# Patient Record
Sex: MALE | Race: BLACK OR AFRICAN AMERICAN | Employment: OTHER | URBAN - METROPOLITAN AREA
[De-identification: names, ages, dates, MRNs, and addresses within clinical notes are randomized per-mention and may not be internally consistent; named-entity substitution may affect disease eponyms.]

---

## 2017-05-10 ENCOUNTER — HOSPITAL ENCOUNTER (OUTPATIENT)
Age: 82
Discharge: HOME | End: 2017-05-10
Payer: MEDICARE

## 2017-05-10 LAB
ALBUMIN SERPL-MCNC: 3.8 G/DL (ref 3.5–5.2)
ALBUMIN/GLOBULIN RATIO: ABNORMAL (ref 1–2.5)
ALP BLD-CCNC: 87 U/L (ref 40–129)
ALT SERPL-CCNC: 20 U/L (ref 5–41)
ANION GAP SERPL CALCULATED.3IONS-SCNC: 11 MMOL/L (ref 9–17)
AST SERPL-CCNC: 32 U/L
BILIRUB SERPL-MCNC: 0.63 MG/DL (ref 0.3–1.2)
BNP INTERPRETATION: ABNORMAL
BUN BLDV-MCNC: 25 MG/DL (ref 8–23)
BUN/CREAT BLD: 15 (ref 9–20)
CALCIUM SERPL-MCNC: 9.6 MG/DL (ref 8.6–10.4)
CHLORIDE BLD-SCNC: 105 MMOL/L (ref 98–107)
CHOLESTEROL, FASTING: 136 MG/DL
CHOLESTEROL/HDL RATIO: 1.7
CO2: 27 MMOL/L (ref 20–31)
CREAT SERPL-MCNC: 1.64 MG/DL (ref 0.7–1.2)
ESTIMATED AVERAGE GLUCOSE: 123 MG/DL
GFR AFRICAN AMERICAN: 49 ML/MIN
GFR NON-AFRICAN AMERICAN: 40 ML/MIN
GFR SERPL CREATININE-BSD FRML MDRD: ABNORMAL ML/MIN/{1.73_M2}
GFR SERPL CREATININE-BSD FRML MDRD: ABNORMAL ML/MIN/{1.73_M2}
GLUCOSE FASTING: 98 MG/DL (ref 70–99)
HBA1C MFR BLD: 5.9 % (ref 4–6)
HCT VFR BLD CALC: 45.6 % (ref 41–53)
HDLC SERPL-MCNC: 79 MG/DL
HEMOGLOBIN: 14.5 G/DL (ref 13.5–17.5)
LDL CHOLESTEROL: 47 MG/DL (ref 0–130)
MCH RBC QN AUTO: 26.9 PG (ref 26–34)
MCHC RBC AUTO-ENTMCNC: 31.7 G/DL (ref 31–37)
MCV RBC AUTO: 84.6 FL (ref 80–100)
PDW BLD-RTO: 16.2 % (ref 11.5–14.5)
PLATELET # BLD: 121 K/UL (ref 130–400)
PMV BLD AUTO: 10.3 FL (ref 6–12)
POTASSIUM SERPL-SCNC: 3.9 MMOL/L (ref 3.7–5.3)
PRO-BNP: 1560 PG/ML
PROSTATE SPECIFIC ANTIGEN: <0.01 UG/L
RBC # BLD: 5.39 M/UL (ref 4.5–5.9)
SODIUM BLD-SCNC: 143 MMOL/L (ref 135–144)
TOTAL PROTEIN: 6.6 G/DL (ref 6.4–8.3)
TRIGLYCERIDE, FASTING: 51 MG/DL
VITAMIN D 25-HYDROXY: 21.1 NG/ML (ref 30–100)
VLDLC SERPL CALC-MCNC: NORMAL MG/DL (ref 1–30)
WBC # BLD: 2.5 K/UL (ref 3.5–11)

## 2017-05-10 PROCEDURE — 36415 COLL VENOUS BLD VENIPUNCTURE: CPT

## 2017-05-10 PROCEDURE — 80061 LIPID PANEL: CPT

## 2017-05-10 PROCEDURE — 84153 ASSAY OF PSA TOTAL: CPT

## 2017-05-10 PROCEDURE — 80053 COMPREHEN METABOLIC PANEL: CPT

## 2017-05-10 PROCEDURE — 82306 VITAMIN D 25 HYDROXY: CPT

## 2017-05-10 PROCEDURE — 83880 ASSAY OF NATRIURETIC PEPTIDE: CPT

## 2017-05-10 PROCEDURE — 85027 COMPLETE CBC AUTOMATED: CPT

## 2017-05-10 PROCEDURE — 83036 HEMOGLOBIN GLYCOSYLATED A1C: CPT

## 2017-06-16 ENCOUNTER — OFFICE VISIT (OUTPATIENT)
Dept: INTERNAL MEDICINE CLINIC | Age: 82
End: 2017-06-16
Payer: MEDICARE

## 2017-06-16 VITALS
WEIGHT: 170 LBS | HEART RATE: 54 BPM | DIASTOLIC BLOOD PRESSURE: 58 MMHG | BODY MASS INDEX: 24.34 KG/M2 | TEMPERATURE: 98.6 F | RESPIRATION RATE: 15 BRPM | SYSTOLIC BLOOD PRESSURE: 126 MMHG | HEIGHT: 70 IN | OXYGEN SATURATION: 94 %

## 2017-06-16 DIAGNOSIS — M19.90 ARTHRITIS: ICD-10-CM

## 2017-06-16 DIAGNOSIS — I10 ESSENTIAL HYPERTENSION: Primary | ICD-10-CM

## 2017-06-16 PROCEDURE — 99213 OFFICE O/P EST LOW 20 MIN: CPT | Performed by: INTERNAL MEDICINE

## 2017-06-16 RX ORDER — PENTOXIFYLLINE 400 MG/1
TABLET, EXTENDED RELEASE ORAL
COMMUNITY
Start: 2017-06-07 | End: 2018-12-20 | Stop reason: ALTCHOICE

## 2017-06-16 ASSESSMENT — PATIENT HEALTH QUESTIONNAIRE - PHQ9
SUM OF ALL RESPONSES TO PHQ QUESTIONS 1-9: 0
1. LITTLE INTEREST OR PLEASURE IN DOING THINGS: 0
SUM OF ALL RESPONSES TO PHQ9 QUESTIONS 1 & 2: 0
2. FEELING DOWN, DEPRESSED OR HOPELESS: 0

## 2017-07-28 ENCOUNTER — TELEPHONE (OUTPATIENT)
Dept: INTERNAL MEDICINE CLINIC | Age: 82
End: 2017-07-28

## 2017-07-28 RX ORDER — PREDNISONE 10 MG/1
10 TABLET ORAL 3 TIMES DAILY
Qty: 15 TABLET | Refills: 0 | Status: SHIPPED | OUTPATIENT
Start: 2017-07-28 | End: 2017-08-02

## 2017-08-21 ENCOUNTER — HOSPITAL ENCOUNTER (OUTPATIENT)
Age: 82
Discharge: HOME | End: 2017-08-21
Payer: MEDICARE

## 2017-08-21 LAB
ALBUMIN SERPL-MCNC: 4.2 G/DL (ref 3.5–5.2)
ALBUMIN/GLOBULIN RATIO: ABNORMAL (ref 1–2.5)
ALP BLD-CCNC: 90 U/L (ref 40–129)
ALT SERPL-CCNC: 17 U/L (ref 5–41)
ANION GAP SERPL CALCULATED.3IONS-SCNC: 12 MMOL/L (ref 9–17)
AST SERPL-CCNC: 32 U/L
BILIRUB SERPL-MCNC: 0.67 MG/DL (ref 0.3–1.2)
BNP INTERPRETATION: ABNORMAL
BUN BLDV-MCNC: 33 MG/DL (ref 8–23)
BUN/CREAT BLD: 18 (ref 9–20)
CALCIUM SERPL-MCNC: 10 MG/DL (ref 8.6–10.4)
CHLORIDE BLD-SCNC: 104 MMOL/L (ref 98–107)
CO2: 26 MMOL/L (ref 20–31)
CREAT SERPL-MCNC: 1.8 MG/DL (ref 0.7–1.2)
GFR AFRICAN AMERICAN: 44 ML/MIN
GFR NON-AFRICAN AMERICAN: 36 ML/MIN
GFR SERPL CREATININE-BSD FRML MDRD: ABNORMAL ML/MIN/{1.73_M2}
GFR SERPL CREATININE-BSD FRML MDRD: ABNORMAL ML/MIN/{1.73_M2}
GLUCOSE BLD-MCNC: 92 MG/DL (ref 70–99)
HCT VFR BLD CALC: 49.3 % (ref 41–53)
HEMOGLOBIN: 15.9 G/DL (ref 13.5–17.5)
MCH RBC QN AUTO: 27.2 PG (ref 26–34)
MCHC RBC AUTO-ENTMCNC: 32.2 G/DL (ref 31–37)
MCV RBC AUTO: 84.6 FL (ref 80–100)
PDW BLD-RTO: 16 % (ref 11.5–14.5)
PLATELET # BLD: 120 K/UL (ref 130–400)
PMV BLD AUTO: 10.4 FL (ref 6–12)
POTASSIUM SERPL-SCNC: 4.3 MMOL/L (ref 3.7–5.3)
PRO-BNP: 887 PG/ML
RBC # BLD: 5.83 M/UL (ref 4.5–5.9)
SODIUM BLD-SCNC: 142 MMOL/L (ref 135–144)
TOTAL PROTEIN: 7.1 G/DL (ref 6.4–8.3)
WBC # BLD: 3.9 K/UL (ref 3.5–11)

## 2017-08-21 PROCEDURE — 85027 COMPLETE CBC AUTOMATED: CPT

## 2017-08-21 PROCEDURE — 83880 ASSAY OF NATRIURETIC PEPTIDE: CPT

## 2017-08-21 PROCEDURE — 36415 COLL VENOUS BLD VENIPUNCTURE: CPT

## 2017-08-21 PROCEDURE — 80053 COMPREHEN METABOLIC PANEL: CPT

## 2017-12-22 ENCOUNTER — OFFICE VISIT (OUTPATIENT)
Dept: INTERNAL MEDICINE CLINIC | Age: 82
End: 2017-12-22
Payer: MEDICARE

## 2017-12-22 VITALS
BODY MASS INDEX: 24.3 KG/M2 | RESPIRATION RATE: 16 BRPM | SYSTOLIC BLOOD PRESSURE: 123 MMHG | HEIGHT: 71 IN | HEART RATE: 63 BPM | WEIGHT: 173.6 LBS | DIASTOLIC BLOOD PRESSURE: 65 MMHG | OXYGEN SATURATION: 97 %

## 2017-12-22 DIAGNOSIS — M19.90 ARTHRITIS: ICD-10-CM

## 2017-12-22 DIAGNOSIS — J31.0 RHINITIS, UNSPECIFIED CHRONICITY, UNSPECIFIED TYPE: ICD-10-CM

## 2017-12-22 DIAGNOSIS — I10 ESSENTIAL HYPERTENSION: Primary | ICD-10-CM

## 2017-12-22 PROCEDURE — 99214 OFFICE O/P EST MOD 30 MIN: CPT | Performed by: INTERNAL MEDICINE

## 2017-12-22 NOTE — PROGRESS NOTES
Cristela Balderrama is a 80 y.o. male who presents for   Chief Complaint   Patient presents with    6 Month Follow-Up    Hypertension    Toe Pain     bilateral toe pain. 1+ month, toes are not currently hurting, it goes on and off    Cough     2 + days    Nasal Congestion     2+ days    Chest Congestion     2+ days    and follow up of chronic medical problems. Patient Active Problem List   Diagnosis    Hypercholesteremia    Sleep apnea    Prostate cancer ([facility] [address]    CRF (chronic renal failure)    OA (osteoarthritis) of knee    Hypercholesteremia    Impotence    HTN (hypertension)    Closed fracture of shaft of metacarpal bone    Primary osteoarthritis of right hip     HPI  Here for follow-up on blood pressure and patient complains of occasional toe pain and also have some runny nose    Current Outpatient Prescriptions   Medication Sig Dispense Refill    pentoxifylline (TRENTAL) 400 MG extended release tablet       vitamin D (CHOLECALCIFEROL) 1000 UNIT TABS tablet Take 1 tablet by mouth daily 60 tablet 1    mirtazapine (REMERON) 15 MG tablet Take 1 tablet by mouth daily      Compression Stockings MISC by Does not apply route 20 - 30 mm calf length 1 each 0    apixaban (ELIQUIS) 2.5 MG TABS tablet Take 1 tablet by mouth 2 times daily 180 tablet 3    telmisartan-hydrochlorothiazide (MICARDIS HCT) 40-12.5 MG per tablet Take 1 tablet by mouth daily 90 tablet 3    rosuvastatin (CRESTOR) 40 MG tablet Take 1 tablet by mouth every evening 90 tablet 3    famotidine (PEPCID) 20 MG tablet Take 1 tablet by mouth daily 90 tablet 3    niacin (NIASPAN) 1000 MG CR tablet Take 1 tablet by mouth 2 times daily 180 tablet 3    aspirin 81 MG tablet Take 81 mg by mouth daily.  FISH OIL Take 1 capsule by mouth 2 times daily        No current facility-administered medications for this visit.         Allergies   Allergen Reactions    Ace Inhibitors     Other      Beta-blockers slow heart rate    Timolol      Eye drops slow heart rate       Past Medical History:   Diagnosis Date    BPH (benign prostatic hyperplasia)     secondary to reynolds    CAD (coronary artery disease) 1/18/2005    CRF (chronic renal failure) 01/01    patient denies kidney problems at pre-testing 2015    DVD (dissociated vertical deviation)     Gout     H/O cardiac catheterization 1/18/05    stents     HTN (hypertension)     Hx of blood clots     left leg (over 23 yrs ago)    Hypercholesteremia     Impotence     Irregular heartbeat     Nodular prostate     -bx Dr. Diamante Long 8/96 and focal inflammation 6/97    OA (osteoarthritis) of knee     Prostate cancer ([facility] [address] 1/06    Unspecified sleep apnea     no machine       Past Surgical History:   Procedure Laterality Date    CORONARY ANGIOPLASTY      CYSTOPLASTY N/A     urethral dilation 10/93 Dr Adi Medina Left     cataract    HAND SURGERY Right 1/08/15    Dr. Rich Neal did right ring and little finger metacarpophalangeal joint extension    JOINT REPLACEMENT      PROSTATE SURGERY      seed implant    REVISION TOTAL HIP ARTHROPLASTY Left 05/07    Dr Declan Hendricks Right 10/20/2015       Family History   Problem Relation Age of Onset    Coronary Art Dis Mother     Hypertension Mother     Cancer Brother      ROS   Constitutional:  Negative for fatigue, loss of appetite and unexpected weight change   HEENT            : Negative for neck stiffness and pain, no congestion or sinus pressure   Eyes                : No visual disturbance or pain   Cardiovascular: No chest pain or palpitations or leg swelling   Respiratory      : Negative for cough, shortness of breath or wheezing   Gastrointestinal: Negative for abdominal pain, constipation or diarrhea and bloating No nausea or vomiting   Genitourinary:     No urgency or frequency, no burning or hematuria   Musculoskeletal: No arthralgias, back pain or myalgias   Skin                  : Negative for rash or erythema   Neurological    : Negative for dizziness, weakness, tremors ,light headedness or syncope   Psychiatric       : Negative for dysphoric mood, sleep disturbances, nervous or anxious, or decreased concentration   All other review of systems was negative    Objective  Physical Examination:    Nursing note reviewed    /65 (Site: Left Arm, Position: Sitting, Cuff Size: Medium Adult)   Pulse 63   Resp 16   Ht 5' 11\" (1.803 m)   Wt 173 lb 9.6 oz (78.7 kg)   SpO2 97%   BMI 24.21 kg/m²   BP Readings from Last 3 Encounters:   12/22/17 123/65   06/16/17 (!) 126/58   11/16/16 138/70         Constitutional:  Adriana Slaughter is oriented to place, person and time ,appears well-developed and well-nourished  HEENT:  Atraumatic and normocephalic, external ears normal bilaterally, nose normal no oropharyngeal exudate and is clear and moist  Eyes:  EOCM normal; conjunctivae normal; PERRLA bilaterally  Neck:  Normal range of motion, neck supple, no JVD and no thyromegaly  Cardiovascular:  RRR, normal heart sounds and intact distal pulses  Pulmonary:  effort normal and breath sounds normal bilaterally,no wheezes or rales, no respiratory distress  Abdominal:  Soft, non-tender; normal bowel sounds, no masses  Musculoskeletal:  Normal range of motion and no edema or tenderness bilaterally  No lymphadenopathy  Neurological:  alert, oriented, and normal reflexes bilaterally  Skin: warm and dry  Psychiatric:  normal mood and effect; behavior normal.    Labs:   Lab Results   Component Value Date    LABA1C 5.9 05/10/2017     Lab Results   Component Value Date    CHOL 145 09/03/2016     Lab Results   Component Value Date    HDL 79 05/10/2017     Lab Results   Component Value Date    LDLCALC 64 05/16/2016     Lab Results   Component Value Date    TRIG 42 09/03/2016     No components found for: CHOLHDL  Lab Results   Component Value Date    WBC 3.9 08/21/2017    HGB 15.9 08/21/2017    HCT 49.3 08/21/2017    MCV 84.6 08/21/2017  (L) 08/21/2017     Lab Results   Component Value Date    INR 1.2 10/08/2015    PROTIME 12.5 (H) 10/08/2015     Lab Results   Component Value Date    GLUCOSE 92 08/21/2017    CREATININE 1.80 (H) 08/21/2017    BUN 33 (H) 08/21/2017     08/21/2017    K 4.3 08/21/2017     08/21/2017    CO2 26 08/21/2017     Lab Results   Component Value Date    ALT 17 08/21/2017    AST 32 08/21/2017    ALKPHOS 90 08/21/2017    BILITOT 0.67 08/21/2017     Lab Results   Component Value Date    LABPROT 5.3 (L) 11/18/2012    LABALBU 4.2 08/21/2017     No results found for: TSH, CBC  Assessment:  1. Essential hypertension    2. Arthritis    3. Rhinitis, unspecified chronicity, unspecified type        Plan:  Blood pressure stable on current medications and patient's visit cardiologist last month and office notes reviewed  Patient's arthritis is stable and I did advise patient to see the foot doctor for toe pain and patient mentioned that patient already saw the foot doctor and he also mentioned that it was due to arthritis  Patient was using Coricidin for his cold symptoms and advised him to continue and there is no evidence of any infection  Review in 6 monthsnutrition and exercise           1. Marissa Pedraza received counseling on the following healthy behaviors: nutrition, exercise     2. Prior labs and health maintenance reviewed. 3.  Discussed use, benefit, and side effects of prescribed medications. Barriers to medication compliance addressed. All his questions were answered. Pt voiced understanding. Marissa Pedraza will continue current medications, diet and exercise. No orders of the defined types were placed in this encounter. Completed Refills               Requested Prescriptions     Pending Prescriptions Disp Refills    vitamin D (CHOLECALCIFEROL) 1000 UNIT TABS tablet 60 tablet 1     Sig: Take 1 tablet by mouth daily     4. Patient given educational materials - see patient instructions    5.  Was a self-tracking handout given in paper form or via [employer]? NO    No orders of the defined types were placed in this encounter. No Follow-up on file. Patient voiced understanding and agreed to treatment plan. Electronically signed by Sav Porter MD on 12/22/2017 at 12:09 PM    This note is created with a voice recognition program and while intend to generate a document that accurately reflects the content of the visit, no guarantee can be provided that every mistake has been identified and corrected by editing.

## 2018-01-24 ENCOUNTER — HOSPITAL ENCOUNTER (OUTPATIENT)
Age: 83
Discharge: HOME | End: 2018-01-24
Payer: MEDICARE

## 2018-01-25 ENCOUNTER — OFFICE VISIT (OUTPATIENT)
Dept: INTERNAL MEDICINE CLINIC | Age: 83
End: 2018-01-25
Payer: MEDICARE

## 2018-01-25 VITALS
HEIGHT: 71 IN | BODY MASS INDEX: 24.44 KG/M2 | DIASTOLIC BLOOD PRESSURE: 64 MMHG | TEMPERATURE: 98 F | HEART RATE: 54 BPM | OXYGEN SATURATION: 97 % | SYSTOLIC BLOOD PRESSURE: 118 MMHG | RESPIRATION RATE: 15 BRPM | WEIGHT: 174.6 LBS

## 2018-01-25 DIAGNOSIS — M25.512 CHRONIC LEFT SHOULDER PAIN: ICD-10-CM

## 2018-01-25 DIAGNOSIS — N18.30 CHRONIC RENAL FAILURE, STAGE 3 (MODERATE) (HCC): ICD-10-CM

## 2018-01-25 DIAGNOSIS — I73.9 PERIPHERAL VASCULAR DISEASE (HCC): ICD-10-CM

## 2018-01-25 DIAGNOSIS — G89.29 CHRONIC LEFT SHOULDER PAIN: ICD-10-CM

## 2018-01-25 DIAGNOSIS — I10 ESSENTIAL HYPERTENSION: ICD-10-CM

## 2018-01-25 DIAGNOSIS — M54.2 NECK PAIN ON LEFT SIDE: Primary | ICD-10-CM

## 2018-01-25 PROCEDURE — 99214 OFFICE O/P EST MOD 30 MIN: CPT | Performed by: INTERNAL MEDICINE

## 2018-01-25 RX ORDER — PREDNISONE 10 MG/1
10 TABLET ORAL 2 TIMES DAILY
Qty: 10 TABLET | Refills: 0 | Status: SHIPPED | OUTPATIENT
Start: 2018-01-25 | End: 2018-01-30

## 2018-01-30 ENCOUNTER — TELEPHONE (OUTPATIENT)
Dept: INTERNAL MEDICINE CLINIC | Age: 83
End: 2018-01-30

## 2018-01-30 DIAGNOSIS — H91.90 HEARING LOSS, UNSPECIFIED HEARING LOSS TYPE, UNSPECIFIED LATERALITY: Primary | ICD-10-CM

## 2018-02-01 NOTE — TELEPHONE ENCOUNTER
Patient notified of PT   has not seen an ENT before and will wait for a call with appt . please schedule

## 2018-02-09 ENCOUNTER — HOSPITAL ENCOUNTER (OUTPATIENT)
Age: 83
Discharge: HOME | End: 2018-02-09
Payer: MEDICARE

## 2018-02-09 LAB
ALBUMIN SERPL-MCNC: 3.6 G/DL (ref 3.5–5.2)
ALBUMIN/GLOBULIN RATIO: ABNORMAL (ref 1–2.5)
ALP BLD-CCNC: 88 U/L (ref 40–129)
ALT SERPL-CCNC: 41 U/L (ref 5–41)
ANION GAP SERPL CALCULATED.3IONS-SCNC: 10 MMOL/L (ref 9–17)
AST SERPL-CCNC: 53 U/L
BILIRUB SERPL-MCNC: 0.69 MG/DL (ref 0.3–1.2)
BNP INTERPRETATION: ABNORMAL
BUN BLDV-MCNC: 31 MG/DL (ref 8–23)
BUN/CREAT BLD: 15 (ref 9–20)
CALCIUM SERPL-MCNC: 9.4 MG/DL (ref 8.6–10.4)
CHLORIDE BLD-SCNC: 103 MMOL/L (ref 98–107)
CHOLESTEROL, FASTING: 170 MG/DL
CHOLESTEROL/HDL RATIO: 1.9
CO2: 27 MMOL/L (ref 20–31)
CREAT SERPL-MCNC: 2.01 MG/DL (ref 0.7–1.2)
ESTIMATED AVERAGE GLUCOSE: 120 MG/DL
GFR AFRICAN AMERICAN: 38 ML/MIN
GFR NON-AFRICAN AMERICAN: 32 ML/MIN
GFR SERPL CREATININE-BSD FRML MDRD: ABNORMAL ML/MIN/{1.73_M2}
GFR SERPL CREATININE-BSD FRML MDRD: ABNORMAL ML/MIN/{1.73_M2}
GLUCOSE BLD-MCNC: 95 MG/DL (ref 70–99)
HBA1C MFR BLD: 5.8 % (ref 4–6)
HCT VFR BLD CALC: 46.1 % (ref 41–53)
HDLC SERPL-MCNC: 90 MG/DL
HEMOGLOBIN: 14.9 G/DL (ref 13.5–17.5)
LDL CHOLESTEROL: 69 MG/DL (ref 0–130)
MCH RBC QN AUTO: 27.7 PG (ref 26–34)
MCHC RBC AUTO-ENTMCNC: 32.2 G/DL (ref 31–37)
MCV RBC AUTO: 85.9 FL (ref 80–100)
NRBC AUTOMATED: ABNORMAL PER 100 WBC
PDW BLD-RTO: 16.9 % (ref 11.5–14.5)
PLATELET # BLD: 113 K/UL (ref 130–400)
PMV BLD AUTO: 9.5 FL (ref 6–12)
POTASSIUM SERPL-SCNC: 4.1 MMOL/L (ref 3.7–5.3)
PRO-BNP: 485 PG/ML
PROSTATE SPECIFIC ANTIGEN: <0.01 UG/L
RBC # BLD: 5.37 M/UL (ref 4.5–5.9)
SODIUM BLD-SCNC: 140 MMOL/L (ref 135–144)
TOTAL CK: 348 U/L (ref 39–308)
TOTAL PROTEIN: 6.3 G/DL (ref 6.4–8.3)
TRIGLYCERIDE, FASTING: 54 MG/DL
TSH SERPL DL<=0.05 MIU/L-ACNC: 2.25 MIU/L (ref 0.3–5)
VITAMIN D 25-HYDROXY: 36.8 NG/ML (ref 30–100)
VLDLC SERPL CALC-MCNC: NORMAL MG/DL (ref 1–30)
WBC # BLD: 3.8 K/UL (ref 3.5–11)

## 2018-02-09 PROCEDURE — 84443 ASSAY THYROID STIM HORMONE: CPT

## 2018-02-09 PROCEDURE — 83880 ASSAY OF NATRIURETIC PEPTIDE: CPT

## 2018-02-09 PROCEDURE — 85027 COMPLETE CBC AUTOMATED: CPT

## 2018-02-09 PROCEDURE — 84153 ASSAY OF PSA TOTAL: CPT

## 2018-02-09 PROCEDURE — 80053 COMPREHEN METABOLIC PANEL: CPT

## 2018-02-09 PROCEDURE — 80061 LIPID PANEL: CPT

## 2018-02-09 PROCEDURE — 82306 VITAMIN D 25 HYDROXY: CPT

## 2018-02-09 PROCEDURE — 36415 COLL VENOUS BLD VENIPUNCTURE: CPT

## 2018-02-09 PROCEDURE — 83036 HEMOGLOBIN GLYCOSYLATED A1C: CPT

## 2018-02-09 PROCEDURE — 82550 ASSAY OF CK (CPK): CPT

## 2018-02-15 ENCOUNTER — OFFICE VISIT (OUTPATIENT)
Dept: INTERNAL MEDICINE CLINIC | Age: 83
End: 2018-02-15
Payer: MEDICARE

## 2018-02-15 VITALS
HEART RATE: 52 BPM | TEMPERATURE: 97.4 F | WEIGHT: 171 LBS | RESPIRATION RATE: 16 BRPM | BODY MASS INDEX: 23.86 KG/M2 | DIASTOLIC BLOOD PRESSURE: 62 MMHG | SYSTOLIC BLOOD PRESSURE: 114 MMHG

## 2018-02-15 DIAGNOSIS — M25.512 ACUTE PAIN OF LEFT SHOULDER: Primary | ICD-10-CM

## 2018-02-15 PROCEDURE — 99213 OFFICE O/P EST LOW 20 MIN: CPT | Performed by: INTERNAL MEDICINE

## 2018-02-15 RX ORDER — PREDNISONE 10 MG/1
10 TABLET ORAL
Qty: 15 TABLET | Refills: 0 | Status: SHIPPED | OUTPATIENT
Start: 2018-02-15 | End: 2018-02-20

## 2018-02-15 NOTE — PROGRESS NOTES
Jamshid Leal is a 80 y.o. male who presents for   Chief Complaint   Patient presents with    Shoulder Pain     fell in his house on 2-11-18- landed on left shoulder. went to ER at [facility] had xray and told no FX but possibly ligaments effected. Saw Dr Gonzales Luevano at [facility] clinic in past for hips and knees    and follow up of chronic medical problems. Patient Active Problem List   Diagnosis    Hypercholesteremia    Sleep apnea    Prostate cancer ([facility] [address]    CRF (chronic renal failure)    OA (osteoarthritis) of knee    Hypercholesteremia    Impotence    HTN (hypertension)    Closed fracture of shaft of metacarpal bone    Primary osteoarthritis of right hip     HPI  Here for left shoulder pain after a fall at home and patient went to the emergency room and had the evaluated and patient still having some discomfort    Current Outpatient Prescriptions   Medication Sig Dispense Refill    predniSONE (DELTASONE) 10 MG tablet Take 1 tablet by mouth 3 times daily (with meals) for 5 days 15 tablet 0    vitamin D (CHOLECALCIFEROL) 1000 UNIT TABS tablet Take 1 tablet by mouth daily 60 tablet 1    pentoxifylline (TRENTAL) 400 MG extended release tablet       mirtazapine (REMERON) 15 MG tablet Take 1 tablet by mouth daily      Compression Stockings MISC by Does not apply route 20 - 30 mm calf length 1 each 0    apixaban (ELIQUIS) 2.5 MG TABS tablet Take 1 tablet by mouth 2 times daily 180 tablet 3    telmisartan-hydrochlorothiazide (MICARDIS HCT) 40-12.5 MG per tablet Take 1 tablet by mouth daily 90 tablet 3    rosuvastatin (CRESTOR) 40 MG tablet Take 1 tablet by mouth every evening 90 tablet 3    famotidine (PEPCID) 20 MG tablet Take 1 tablet by mouth daily 90 tablet 3    niacin (NIASPAN) 1000 MG CR tablet Take 1 tablet by mouth 2 times daily 180 tablet 3    aspirin 81 MG tablet Take 81 mg by mouth daily.       FISH OIL Take 1 capsule by mouth 2 times daily        No current facility-administered medications for Component Value Date    HDL 90 02/09/2018     Lab Results   Component Value Date    LDLCALC 64 05/16/2016     Lab Results   Component Value Date    TRIG 42 09/03/2016     No components found for: [place], [place]  Lab Results   Component Value Date    WBC 3.8 02/09/2018    HGB 14.9 02/09/2018    HCT 46.1 02/09/2018    MCV 85.9 02/09/2018     (L) 02/09/2018     Lab Results   Component Value Date    INR 1.2 10/08/2015    PROTIME 12.5 (H) 10/08/2015     Lab Results   Component Value Date    GLUCOSE 95 02/09/2018    CREATININE 2.01 (H) 02/09/2018    BUN 31 (H) 02/09/2018     02/09/2018    K 4.1 02/09/2018     02/09/2018    CO2 27 02/09/2018     Lab Results   Component Value Date    ALT 41 02/09/2018    AST 53 (H) 02/09/2018    ALKPHOS 88 02/09/2018    BILITOT 0.69 02/09/2018     Lab Results   Component Value Date    LABPROT 5.3 (L) 11/18/2012    LABALBU 3.6 02/09/2018     Lab Results   Component Value Date    TSH 2.25 02/09/2018     Assessment:  1. Acute pain of left shoulder        Plan:  X-ray of the left shoulder done at [facility] reviewed which did not show any fracture but patient may have some rotator cuff injury but as the injuries so acute and happen only last week it could be a contusion that maybe preventing patient from moving his shoulder and so advised patient to continue exercises as he can tolerate and also started him on prednisone 10 mg 3 times a day for 5 days and patient to call me back on Monday and if his symptoms do not improve will refer to orthopedics and patient verbalized understanding  Review as scheduled           1. Guera Hernández received counseling on the following healthy behaviors: nutrition and exercise    2. Prior labs and health maintenance reviewed. 3.  Discussed use, benefit, and side effects of prescribed medications. Barriers to medication compliance addressed. All his questions were answered. Pt voiced understanding.    Guera Hernández will continue current medications,

## 2018-05-10 ENCOUNTER — HOSPITAL ENCOUNTER (OUTPATIENT)
Age: 83
Setting detail: SPECIMEN
Discharge: HOME | End: 2018-05-10
Payer: MEDICARE

## 2018-05-10 LAB
ANION GAP SERPL CALCULATED.3IONS-SCNC: 11 MMOL/L (ref 9–17)
BUN BLDV-MCNC: 33 MG/DL (ref 8–23)
BUN/CREAT BLD: ABNORMAL (ref 9–20)
CALCIUM SERPL-MCNC: 9.7 MG/DL (ref 8.6–10.4)
CHLORIDE BLD-SCNC: 101 MMOL/L (ref 98–107)
CO2: 27 MMOL/L (ref 20–31)
CREAT SERPL-MCNC: 2.05 MG/DL (ref 0.7–1.2)
GFR AFRICAN AMERICAN: 37 ML/MIN
GFR NON-AFRICAN AMERICAN: 31 ML/MIN
GFR SERPL CREATININE-BSD FRML MDRD: ABNORMAL ML/MIN/{1.73_M2}
GFR SERPL CREATININE-BSD FRML MDRD: ABNORMAL ML/MIN/{1.73_M2}
GLUCOSE BLD-MCNC: 88 MG/DL (ref 70–99)
HCT VFR BLD CALC: 46.8 % (ref 40.7–50.3)
HEMOGLOBIN: 14.5 G/DL (ref 13–17)
MCH RBC QN AUTO: 27 PG (ref 25.2–33.5)
MCHC RBC AUTO-ENTMCNC: 31 G/DL (ref 28.4–34.8)
MCV RBC AUTO: 87.2 FL (ref 82.6–102.9)
NRBC AUTOMATED: 0 PER 100 WBC
PDW BLD-RTO: 15.3 % (ref 11.8–14.4)
PLATELET # BLD: 115 K/UL (ref 138–453)
PMV BLD AUTO: 11.8 FL (ref 8.1–13.5)
POTASSIUM SERPL-SCNC: 4 MMOL/L (ref 3.7–5.3)
RBC # BLD: 5.37 M/UL (ref 4.21–5.77)
SODIUM BLD-SCNC: 139 MMOL/L (ref 135–144)
WBC # BLD: 4.4 K/UL (ref 3.5–11.3)

## 2018-06-22 ENCOUNTER — OFFICE VISIT (OUTPATIENT)
Dept: INTERNAL MEDICINE CLINIC | Age: 83
End: 2018-06-22
Payer: MEDICARE

## 2018-06-22 VITALS
OXYGEN SATURATION: 96 % | WEIGHT: 171.2 LBS | TEMPERATURE: 97.9 F | BODY MASS INDEX: 23.97 KG/M2 | DIASTOLIC BLOOD PRESSURE: 68 MMHG | SYSTOLIC BLOOD PRESSURE: 108 MMHG | HEIGHT: 71 IN | HEART RATE: 52 BPM

## 2018-06-22 DIAGNOSIS — N18.30 CHRONIC RENAL FAILURE, STAGE 3 (MODERATE) (HCC): ICD-10-CM

## 2018-06-22 DIAGNOSIS — M19.90 ARTHRITIS: ICD-10-CM

## 2018-06-22 DIAGNOSIS — I10 ESSENTIAL HYPERTENSION: Primary | ICD-10-CM

## 2018-06-22 PROCEDURE — 99214 OFFICE O/P EST MOD 30 MIN: CPT | Performed by: INTERNAL MEDICINE

## 2018-06-27 DIAGNOSIS — I10 ESSENTIAL HYPERTENSION: Primary | ICD-10-CM

## 2018-06-28 RX ORDER — AMLODIPINE BESYLATE 5 MG/1
5 TABLET ORAL DAILY
Qty: 30 TABLET | Refills: 3 | Status: SHIPPED | OUTPATIENT
Start: 2018-06-28 | End: 2018-12-20 | Stop reason: SDUPTHER

## 2018-08-20 ENCOUNTER — HOSPITAL ENCOUNTER (OUTPATIENT)
Age: 83
Setting detail: SPECIMEN
Discharge: HOME | End: 2018-08-20
Payer: MEDICARE

## 2018-08-20 LAB
ALBUMIN SERPL-MCNC: 3.7 G/DL (ref 3.5–5.2)
ALBUMIN/GLOBULIN RATIO: 1.3 (ref 1–2.5)
ALP BLD-CCNC: 101 U/L (ref 40–129)
ALT SERPL-CCNC: 28 U/L (ref 5–41)
ANION GAP SERPL CALCULATED.3IONS-SCNC: 8 MMOL/L (ref 9–17)
AST SERPL-CCNC: 39 U/L
BILIRUB SERPL-MCNC: 0.54 MG/DL (ref 0.3–1.2)
BNP INTERPRETATION: ABNORMAL
BUN BLDV-MCNC: 28 MG/DL (ref 8–23)
BUN/CREAT BLD: ABNORMAL (ref 9–20)
CALCIUM SERPL-MCNC: 9.2 MG/DL (ref 8.6–10.4)
CHLORIDE BLD-SCNC: 105 MMOL/L (ref 98–107)
CHOLESTEROL, FASTING: 150 MG/DL
CHOLESTEROL/HDL RATIO: 1.8
CO2: 28 MMOL/L (ref 20–31)
CREAT SERPL-MCNC: 1.67 MG/DL (ref 0.7–1.2)
GFR AFRICAN AMERICAN: 47 ML/MIN
GFR NON-AFRICAN AMERICAN: 39 ML/MIN
GFR SERPL CREATININE-BSD FRML MDRD: ABNORMAL ML/MIN/{1.73_M2}
GFR SERPL CREATININE-BSD FRML MDRD: ABNORMAL ML/MIN/{1.73_M2}
GLUCOSE FASTING: 92 MG/DL (ref 70–99)
HCT VFR BLD CALC: 45 % (ref 40.7–50.3)
HDLC SERPL-MCNC: 82 MG/DL
HEMOGLOBIN: 14 G/DL (ref 13–17)
LDL CHOLESTEROL: 58 MG/DL (ref 0–130)
MCH RBC QN AUTO: 27.3 PG (ref 25.2–33.5)
MCHC RBC AUTO-ENTMCNC: 31.1 G/DL (ref 28.4–34.8)
MCV RBC AUTO: 87.7 FL (ref 82.6–102.9)
NRBC AUTOMATED: 0 PER 100 WBC
PDW BLD-RTO: 14.6 % (ref 11.8–14.4)
PLATELET # BLD: 145 K/UL (ref 138–453)
PMV BLD AUTO: 12.1 FL (ref 8.1–13.5)
POTASSIUM SERPL-SCNC: 4.6 MMOL/L (ref 3.7–5.3)
PRO-BNP: 800 PG/ML
RBC # BLD: 5.13 M/UL (ref 4.21–5.77)
SODIUM BLD-SCNC: 141 MMOL/L (ref 135–144)
TOTAL PROTEIN: 6.6 G/DL (ref 6.4–8.3)
TRIGLYCERIDE, FASTING: 51 MG/DL
VLDLC SERPL CALC-MCNC: NORMAL MG/DL (ref 1–30)
WBC # BLD: 3.6 K/UL (ref 3.5–11.3)

## 2018-09-24 ENCOUNTER — HOSPITAL ENCOUNTER (OUTPATIENT)
Age: 83
Setting detail: SPECIMEN
Discharge: HOME | End: 2018-09-24
Payer: MEDICARE

## 2018-09-24 LAB
ANION GAP SERPL CALCULATED.3IONS-SCNC: 15 MMOL/L (ref 9–17)
BUN BLDV-MCNC: 20 MG/DL (ref 8–23)
BUN/CREAT BLD: ABNORMAL (ref 9–20)
CALCIUM SERPL-MCNC: 9.4 MG/DL (ref 8.6–10.4)
CHLORIDE BLD-SCNC: 106 MMOL/L (ref 98–107)
CO2: 23 MMOL/L (ref 20–31)
CREAT SERPL-MCNC: 1.47 MG/DL (ref 0.7–1.2)
GFR AFRICAN AMERICAN: 55 ML/MIN
GFR NON-AFRICAN AMERICAN: 45 ML/MIN
GFR SERPL CREATININE-BSD FRML MDRD: ABNORMAL ML/MIN/{1.73_M2}
GFR SERPL CREATININE-BSD FRML MDRD: ABNORMAL ML/MIN/{1.73_M2}
GLUCOSE FASTING: 89 MG/DL (ref 70–99)
POTASSIUM SERPL-SCNC: 4.4 MMOL/L (ref 3.7–5.3)
SODIUM BLD-SCNC: 144 MMOL/L (ref 135–144)

## 2018-12-04 ENCOUNTER — HOSPITAL ENCOUNTER (OUTPATIENT)
Age: 83
Discharge: HOME | End: 2018-12-04
Payer: MEDICARE

## 2018-12-04 LAB
ALBUMIN SERPL-MCNC: 3.9 G/DL (ref 3.5–5.2)
ALBUMIN/GLOBULIN RATIO: 1.3 (ref 1–2.5)
ALP BLD-CCNC: 98 U/L (ref 40–129)
ALT SERPL-CCNC: 23 U/L (ref 5–41)
ANION GAP SERPL CALCULATED.3IONS-SCNC: 14 MMOL/L (ref 9–17)
AST SERPL-CCNC: 35 U/L
BILIRUB SERPL-MCNC: 0.72 MG/DL (ref 0.3–1.2)
BUN BLDV-MCNC: 25 MG/DL (ref 8–23)
BUN/CREAT BLD: ABNORMAL (ref 9–20)
CALCIUM SERPL-MCNC: 9.6 MG/DL (ref 8.6–10.4)
CHLORIDE BLD-SCNC: 108 MMOL/L (ref 98–107)
CHOLESTEROL, FASTING: 151 MG/DL
CHOLESTEROL/HDL RATIO: 1.9
CO2: 23 MMOL/L (ref 20–31)
CREAT SERPL-MCNC: 1.75 MG/DL (ref 0.7–1.2)
GFR AFRICAN AMERICAN: 45 ML/MIN
GFR NON-AFRICAN AMERICAN: 37 ML/MIN
GFR SERPL CREATININE-BSD FRML MDRD: ABNORMAL ML/MIN/{1.73_M2}
GFR SERPL CREATININE-BSD FRML MDRD: ABNORMAL ML/MIN/{1.73_M2}
GLUCOSE FASTING: 89 MG/DL (ref 70–99)
HCT VFR BLD CALC: 44.9 % (ref 40.7–50.3)
HDLC SERPL-MCNC: 80 MG/DL
HEMOGLOBIN: 14.3 G/DL (ref 13–17)
LDL CHOLESTEROL: 61 MG/DL (ref 0–130)
MCH RBC QN AUTO: 27.4 PG (ref 25.2–33.5)
MCHC RBC AUTO-ENTMCNC: 31.8 G/DL (ref 28.4–34.8)
MCV RBC AUTO: 86.2 FL (ref 82.6–102.9)
NRBC AUTOMATED: 0 PER 100 WBC
PDW BLD-RTO: 14.9 % (ref 11.8–14.4)
PLATELET # BLD: 158 K/UL (ref 138–453)
PMV BLD AUTO: 11.6 FL (ref 8.1–13.5)
POTASSIUM SERPL-SCNC: 4.3 MMOL/L (ref 3.7–5.3)
RBC # BLD: 5.21 M/UL (ref 4.21–5.77)
SODIUM BLD-SCNC: 145 MMOL/L (ref 135–144)
TOTAL PROTEIN: 6.9 G/DL (ref 6.4–8.3)
TRIGLYCERIDE, FASTING: 52 MG/DL
VLDLC SERPL CALC-MCNC: NORMAL MG/DL (ref 1–30)
WBC # BLD: 4.4 K/UL (ref 3.5–11.3)

## 2018-12-04 PROCEDURE — 36415 COLL VENOUS BLD VENIPUNCTURE: CPT

## 2018-12-04 PROCEDURE — 80053 COMPREHEN METABOLIC PANEL: CPT

## 2018-12-04 PROCEDURE — 85027 COMPLETE CBC AUTOMATED: CPT

## 2018-12-04 PROCEDURE — 80061 LIPID PANEL: CPT

## 2018-12-20 ENCOUNTER — OFFICE VISIT (OUTPATIENT)
Dept: INTERNAL MEDICINE CLINIC | Age: 83
End: 2018-12-20
Payer: MEDICARE

## 2018-12-20 VITALS
HEART RATE: 55 BPM | SYSTOLIC BLOOD PRESSURE: 130 MMHG | BODY MASS INDEX: 25.62 KG/M2 | OXYGEN SATURATION: 98 % | HEIGHT: 71 IN | DIASTOLIC BLOOD PRESSURE: 64 MMHG | WEIGHT: 183 LBS

## 2018-12-20 DIAGNOSIS — M19.90 ARTHRITIS: ICD-10-CM

## 2018-12-20 DIAGNOSIS — N18.30 CHRONIC RENAL FAILURE, STAGE 3 (MODERATE) (HCC): ICD-10-CM

## 2018-12-20 DIAGNOSIS — Z90.79 HISTORY OF PROSTATECTOMY: ICD-10-CM

## 2018-12-20 DIAGNOSIS — I10 ESSENTIAL HYPERTENSION: Primary | ICD-10-CM

## 2018-12-20 DIAGNOSIS — R35.0 URINARY FREQUENCY: ICD-10-CM

## 2018-12-20 PROCEDURE — 99214 OFFICE O/P EST MOD 30 MIN: CPT | Performed by: INTERNAL MEDICINE

## 2018-12-20 RX ORDER — FAMOTIDINE 20 MG/1
20 TABLET, FILM COATED ORAL DAILY
Qty: 90 TABLET | Refills: 3 | Status: SHIPPED | OUTPATIENT
Start: 2018-12-20 | End: 2019-01-03 | Stop reason: SDUPTHER

## 2018-12-20 RX ORDER — ROSUVASTATIN CALCIUM 40 MG/1
40 TABLET, COATED ORAL EVERY EVENING
Qty: 90 TABLET | Refills: 3 | Status: SHIPPED | OUTPATIENT
Start: 2018-12-20

## 2018-12-20 RX ORDER — MIRTAZAPINE 15 MG/1
15 TABLET, FILM COATED ORAL DAILY
Qty: 30 TABLET | Refills: 2 | Status: CANCELLED | OUTPATIENT
Start: 2018-12-20

## 2018-12-20 RX ORDER — NIACIN 1000 MG/1
1000 TABLET, EXTENDED RELEASE ORAL 2 TIMES DAILY
Qty: 180 TABLET | Refills: 3 | Status: SHIPPED | OUTPATIENT
Start: 2018-12-20 | End: 2021-04-20 | Stop reason: ALTCHOICE

## 2018-12-20 RX ORDER — PENTOXIFYLLINE 400 MG/1
400 TABLET, EXTENDED RELEASE ORAL
Qty: 90 TABLET | Refills: 2 | Status: CANCELLED | OUTPATIENT
Start: 2018-12-20

## 2018-12-20 RX ORDER — AMLODIPINE BESYLATE 5 MG/1
5 TABLET ORAL DAILY
Qty: 30 TABLET | Refills: 3 | Status: SHIPPED | OUTPATIENT
Start: 2018-12-20 | End: 2021-04-01

## 2018-12-20 NOTE — PROGRESS NOTES
 BPH (benign prostatic hyperplasia)     secondary to reynolds    CAD (coronary artery disease) 1/18/2005    CRF (chronic renal failure) 01/01    patient denies kidney problems at pre-testing 2015    DVD (dissociated vertical deviation)     Gout     H/O cardiac catheterization 1/18/05    stents     HTN (hypertension)     Hx of blood clots     left leg (over 23 yrs ago)    Hypercholesteremia     Impotence     Irregular heartbeat     Nodular prostate     -bx Dr. Tony Castro 8/96 and focal inflammation 6/97    OA (osteoarthritis) of knee     Prostate cancer ([facility] [address] 1/06    Unspecified sleep apnea     no machine       Past Surgical History:   Procedure Laterality Date    CORONARY ANGIOPLASTY      CYSTOPLASTY N/A     urethral dilation 10/93 Dr Zia Ny Left     cataract    HAND SURGERY Right 1/08/15    Dr. Nic Montenegro did right ring and little finger metacarpophalangeal joint extension    JOINT REPLACEMENT      PROSTATE SURGERY      seed implant    REVISION TOTAL HIP ARTHROPLASTY Left 05/07    Dr Roni Jay Right 10/20/2015       Family History   Problem Relation Age of Onset    Coronary Art Dis Mother     Hypertension Mother     Cancer Brother      ROS   Constitutional:  Negative for fatigue, loss of appetite and unexpected weight change   HEENT            : Negative for neck stiffness and pain, no congestion or sinus pressure   Eyes                : No visual disturbance or pain   Cardiovascular: No chest pain or palpitations or leg swelling   Respiratory      : Negative for cough, shortness of breath or wheezing   Gastrointestinal: Negative for abdominal pain, constipation or diarrhea and bloating No nausea or vomiting   Genitourinary:     Positive for urgency or frequency, no burning or hematuria   Musculoskeletal: No arthralgias, back pain or myalgias   Skin                  : Negative for rash or erythema   Neurological    : Negative for 1.2 10/08/2015    PROTIME 12.5 (H) 10/08/2015     Lab Results   Component Value Date    GLUCOSE 88 05/10/2018    CREATININE 1.75 (H) 12/04/2018    BUN 25 (H) 12/04/2018     (H) 12/04/2018    K 4.3 12/04/2018     (H) 12/04/2018    CO2 23 12/04/2018     Lab Results   Component Value Date    ALT 23 12/04/2018    AST 35 12/04/2018    ALKPHOS 98 12/04/2018    BILITOT 0.72 12/04/2018     Lab Results   Component Value Date    LABPROT 5.3 (L) 11/18/2012    LABALBU 3.9 12/04/2018     Lab Results   Component Value Date    TSH 2.25 02/09/2018     Assessment:  1. Essential hypertension    2. Arthritis    3. Chronic renal failure, stage 3 (moderate) ([facility])    4. Urinary frequency    5. History of prostatectomy        Plan:  Blood pressure stable on current medications and patient is on Norvasc and cardiology note reviewed and planning to add losartan down the line and also torsemide and will be seeing them again in January  Patient's arthritis is stable and not taking any NSAIDs and his creatinine is stable  Patient I shall prostate surgery done many years back and complaining of urinary incontinence or frequency and will start on Myrbetriq 50 mg daily and office samples given and advised patient to take once a day and call me back in 3 weeks  Review in 4 months           1. Oswaldo Fabry received counseling on the following healthy behaviors: nutrition and exercise    2. Prior labs and health maintenance reviewed. 3.  Discussed use, benefit, and side effects of prescribed medications. Barriers to medication compliance addressed. All his questions were answered. Pt voiced understanding. Oswaldo Fabry will continue current medications, diet and exercise.               Orders Placed This Encounter   Medications    apixaban (ELIQUIS) 2.5 MG TABS tablet     Sig: Take 1 tablet by mouth 2 times daily     Dispense:  180 tablet     Refill:  3    famotidine (PEPCID) 20 MG tablet     Sig: Take 1 tablet by mouth daily     Dispense: 90 tablet     Refill:  3    niacin (NIASPAN) 1000 MG extended release tablet     Sig: Take 1 tablet by mouth 2 times daily     Dispense:  180 tablet     Refill:  3    rosuvastatin (CRESTOR) 40 MG tablet     Sig: Take 1 tablet by mouth every evening     Dispense:  90 tablet     Refill:  3    vitamin D (CHOLECALCIFEROL) 1000 UNIT TABS tablet     Sig: Take 1 tablet by mouth daily     Dispense:  60 tablet     Refill:  1    amLODIPine (NORVASC) 5 MG tablet     Sig: Take 1 tablet by mouth daily     Dispense:  30 tablet     Refill:  3    aspirin 81 MG tablet     Sig: Take 1 tablet by mouth daily     Dispense:  30 tablet     Refill:  2          Completed Refills               Requested Prescriptions     Signed Prescriptions Disp Refills    apixaban (ELIQUIS) 2.5 MG TABS tablet 180 tablet 3     Sig: Take 1 tablet by mouth 2 times daily    famotidine (PEPCID) 20 MG tablet 90 tablet 3     Sig: Take 1 tablet by mouth daily    niacin (NIASPAN) 1000 MG extended release tablet 180 tablet 3     Sig: Take 1 tablet by mouth 2 times daily    rosuvastatin (CRESTOR) 40 MG tablet 90 tablet 3     Sig: Take 1 tablet by mouth every evening    vitamin D (CHOLECALCIFEROL) 1000 UNIT TABS tablet 60 tablet 1     Sig: Take 1 tablet by mouth daily    amLODIPine (NORVASC) 5 MG tablet 30 tablet 3     Sig: Take 1 tablet by mouth daily    aspirin 81 MG tablet 30 tablet 2     Sig: Take 1 tablet by mouth daily     4. Patient given educational materials - see patient instructions    5. Was a self-tracking handout given in paper form or via [employer]? NO    No orders of the defined types were placed in this encounter. No Follow-up on file. Patient voiced understanding and agreed to treatment plan.      Electronically signed by Najma Hugo MD on 12/20/2018 at 2:41 PM    This note is created with a voice recognition program and while intend to generate a document that accurately reflects the content of the visit, no guarantee can be

## 2019-01-03 RX ORDER — FAMOTIDINE 20 MG/1
20 TABLET, FILM COATED ORAL DAILY
Qty: 7 TABLET | Refills: 0 | Status: SHIPPED | OUTPATIENT
Start: 2019-01-03

## 2019-01-08 ENCOUNTER — TELEPHONE (OUTPATIENT)
Dept: INTERNAL MEDICINE CLINIC | Age: 84
End: 2019-01-08

## 2019-01-31 ENCOUNTER — HOSPITAL ENCOUNTER (OUTPATIENT)
Age: 84
Setting detail: SPECIMEN
Discharge: HOME | End: 2019-01-31
Payer: MEDICARE

## 2019-01-31 LAB
ANION GAP SERPL CALCULATED.3IONS-SCNC: 7 MMOL/L (ref 9–17)
BUN BLDV-MCNC: 25 MG/DL (ref 8–23)
BUN/CREAT BLD: ABNORMAL (ref 9–20)
CALCIUM SERPL-MCNC: 9.4 MG/DL (ref 8.6–10.4)
CHLORIDE BLD-SCNC: 106 MMOL/L (ref 98–107)
CO2: 26 MMOL/L (ref 20–31)
CREAT SERPL-MCNC: 1.79 MG/DL (ref 0.7–1.2)
GFR AFRICAN AMERICAN: 44 ML/MIN
GFR NON-AFRICAN AMERICAN: 36 ML/MIN
GFR SERPL CREATININE-BSD FRML MDRD: ABNORMAL ML/MIN/{1.73_M2}
GFR SERPL CREATININE-BSD FRML MDRD: ABNORMAL ML/MIN/{1.73_M2}
GLUCOSE BLD-MCNC: 85 MG/DL (ref 70–99)
HCT VFR BLD CALC: 45.7 % (ref 40.7–50.3)
HEMOGLOBIN: 14.1 G/DL (ref 13–17)
MCH RBC QN AUTO: 26.7 PG (ref 25.2–33.5)
MCHC RBC AUTO-ENTMCNC: 30.9 G/DL (ref 28.4–34.8)
MCV RBC AUTO: 86.4 FL (ref 82.6–102.9)
NRBC AUTOMATED: 0 PER 100 WBC
PDW BLD-RTO: 14.3 % (ref 11.8–14.4)
PLATELET # BLD: 135 K/UL (ref 138–453)
PMV BLD AUTO: 11.6 FL (ref 8.1–13.5)
POTASSIUM SERPL-SCNC: 4.1 MMOL/L (ref 3.7–5.3)
RBC # BLD: 5.29 M/UL (ref 4.21–5.77)
SODIUM BLD-SCNC: 139 MMOL/L (ref 135–144)
WBC # BLD: 3.6 K/UL (ref 3.5–11.3)

## 2019-06-05 ENCOUNTER — OFFICE VISIT (OUTPATIENT)
Dept: INTERNAL MEDICINE CLINIC | Age: 84
End: 2019-06-05
Payer: MEDICARE

## 2019-06-05 VITALS
TEMPERATURE: 98.5 F | HEART RATE: 64 BPM | SYSTOLIC BLOOD PRESSURE: 116 MMHG | HEIGHT: 69 IN | DIASTOLIC BLOOD PRESSURE: 70 MMHG | BODY MASS INDEX: 26.98 KG/M2 | WEIGHT: 182.2 LBS | RESPIRATION RATE: 18 BRPM

## 2019-06-05 DIAGNOSIS — M79.89 LEG SWELLING: Primary | ICD-10-CM

## 2019-06-05 DIAGNOSIS — I10 ESSENTIAL HYPERTENSION: ICD-10-CM

## 2019-06-05 DIAGNOSIS — N18.30 CHRONIC KIDNEY DISEASE, STAGE III (MODERATE) (HCC): ICD-10-CM

## 2019-06-05 PROCEDURE — 99213 OFFICE O/P EST LOW 20 MIN: CPT | Performed by: INTERNAL MEDICINE

## 2019-06-05 ASSESSMENT — PATIENT HEALTH QUESTIONNAIRE - PHQ9
SUM OF ALL RESPONSES TO PHQ9 QUESTIONS 1 & 2: 0
1. LITTLE INTEREST OR PLEASURE IN DOING THINGS: 0
SUM OF ALL RESPONSES TO PHQ QUESTIONS 1-9: 0
2. FEELING DOWN, DEPRESSED OR HOPELESS: 0
SUM OF ALL RESPONSES TO PHQ QUESTIONS 1-9: 0

## 2019-06-05 NOTE — PROGRESS NOTES
Edel Portillo is a 80 y.o. male who presents for   Chief Complaint   Patient presents with    Leg Swelling     both legs swelling but right is worse than left. swelling does go down at night but swells again during the day, denies pain. last labs Jan 2019    Immunizations     due for pnuemo 23    and follow up of chronic medical problems. Patient Active Problem List   Diagnosis    Hypercholesteremia    Sleep apnea    Prostate cancer ([facility] [address]    CRF (chronic renal failure)    OA (osteoarthritis) of knee    Hypercholesteremia    Impotence    HTN (hypertension)    Closed fracture of shaft of metacarpal bone    Primary osteoarthritis of right hip     HPI  Here for evaluation of bilateral lower extremity swelling right greater than left    Current Outpatient Medications   Medication Sig Dispense Refill    Compression Stockings MISC by Does not apply route 20-30 mmHg calf length 1 each 0    apixaban (ELIQUIS) 2.5 MG TABS tablet Take 1 tablet by mouth 2 times daily 7 tablet 0    famotidine (PEPCID) 20 MG tablet Take 1 tablet by mouth daily 7 tablet 0    niacin (NIASPAN) 1000 MG extended release tablet Take 1 tablet by mouth 2 times daily 180 tablet 3    rosuvastatin (CRESTOR) 40 MG tablet Take 1 tablet by mouth every evening 90 tablet 3    amLODIPine (NORVASC) 5 MG tablet Take 1 tablet by mouth daily 30 tablet 3    aspirin 81 MG tablet Take 1 tablet by mouth daily 30 tablet 2    FISH OIL Take 1 capsule by mouth 2 times daily        No current facility-administered medications for this visit.         Allergies   Allergen Reactions    Ace Inhibitors     Other      Beta-blockers slow heart rate    Timolol      Eye drops slow heart rate       Past Medical History:   Diagnosis Date    BPH (benign prostatic hyperplasia)     secondary to reynolds    CAD (coronary artery disease) 1/18/2005    CRF (chronic renal failure) 01/01    patient denies kidney problems at pre-testing 2015    DVD (dissociated vertical deviation)     Gout     H/O cardiac catheterization 1/18/05    stents     HTN (hypertension)     Hx of blood clots     left leg (over 23 yrs ago)    Hypercholesteremia     Impotence     Irregular heartbeat     Nodular prostate     -bx Dr. Linda De Santiago 8/96 and focal inflammation 6/97    OA (osteoarthritis) of knee     Prostate cancer ([facility] [address] 1/06    Unspecified sleep apnea     no machine       Past Surgical History:   Procedure Laterality Date    CORONARY ANGIOPLASTY      CYSTOPLASTY N/A     urethral dilation 10/93 Dr Bridgett Luciano Left     cataract    HAND SURGERY Right 1/08/15    Dr. Madisyn Mckeon did right ring and little finger metacarpophalangeal joint extension    JOINT REPLACEMENT      PROSTATE SURGERY      seed implant    REVISION TOTAL HIP ARTHROPLASTY Left 05/07    Dr Jonh Cowan Right 10/20/2015       Family History   Problem Relation Age of Onset    Coronary Art Dis Mother     Hypertension Mother     Cancer Brother      ROS   Constitutional:  Negative for fatigue, loss of appetite and unexpected weight change   HEENT            : Negative for neck stiffness and pain, no congestion or sinus pressure   Eyes                : No visual disturbance or pain   Cardiovascular: No chest pain or palpitations positive for leg swelling   Respiratory      : Negative for cough, shortness of breath or wheezing   Gastrointestinal: Negative for abdominal pain, constipation or diarrhea and bloating No nausea or vomiting   Genitourinary:     No urgency or frequency, no burning or hematuria   Musculoskeletal: No arthralgias, back pain or myalgias   Skin                  : Negative for rash or erythema   Neurological    : Negative for dizziness, weakness, tremors ,light headedness or syncope   Psychiatric       : Negative for dysphoric mood, sleep disturbances, nervous or anxious, or decreased concentration   All other review of systems was negative    Objective  Physical Examination:    Nursing note reviewed    /70 (Site: Right Upper Arm, Position: Sitting, Cuff Size: Medium Adult)   Pulse 64   Temp 98.5 °F (36.9 °C) (Oral)   Resp 18   Ht 5' 9\" (1.753 m)   Wt 182 lb 3.2 oz (82.6 kg)   BMI 26.91 kg/m²   BP Readings from Last 3 Encounters:   06/05/19 116/70   12/20/18 130/64   06/22/18 108/68         Constitutional:  Luis Nguyen is oriented to place, person and time ,appears well-developed and well-nourished  HEENT:  Atraumatic and normocephalic, external ears normal bilaterally, nose normal no oropharyngeal exudate and is clear and moist  Eyes:  EOCM normal; conjunctivae normal; PERRLA bilaterally  Neck:  Normal range of motion, neck supple, no JVD and no thyromegaly  Cardiovascular:  RRR, normal heart sounds and intact distal pulses  Pulmonary:  effort normal and breath sounds normal bilaterally,no wheezes or rales, no respiratory distress  Abdominal:  Soft, non-tender; normal bowel sounds, no masses  Musculoskeletal:  Normal range of motion and 2+ edema or tenderness bilaterally  No lymphadenopathy  Neurological:  alert, oriented, and normal reflexes bilaterally  Skin: warm and dry  Psychiatric:  normal mood and effect; behavior normal.    Labs:   Lab Results   Component Value Date    LABA1C 5.8 02/09/2018     Lab Results   Component Value Date    CHOL 145 09/03/2016     Lab Results   Component Value Date    HDL 80 12/04/2018     Lab Results   Component Value Date    LDLCALC 64 05/16/2016     Lab Results   Component Value Date    TRIG 42 09/03/2016     No components found for: CHOLHDL  Lab Results   Component Value Date    WBC 3.6 01/31/2019    HGB 14.1 01/31/2019    HCT 45.7 01/31/2019    MCV 86.4 01/31/2019     (L) 01/31/2019     Lab Results   Component Value Date    INR 1.2 10/08/2015    PROTIME 12.5 (H) 10/08/2015     Lab Results   Component Value Date    GLUCOSE 85 01/31/2019    CREATININE 1.79 (H) 01/31/2019    BUN 25 (H) 12/5/2019). Patient voiced understanding and agreed to treatment plan. Electronically signed by Francia Echeverria MD on 6/5/2019 at 2:42 PM    This note is created with a voice recognition program and while intend to generate a document that accurately reflects the content of the visit, no guarantee can be provided that every mistake has been identified and corrected by editing.

## 2019-06-05 NOTE — PROGRESS NOTES
Visit Information    Have you changed or started any medications since your last visit including any over-the-counter medicines, vitamins, or herbal medicines? no   Are you having any side effects from any of your medications? -  no  Have you stopped taking any of your medications? Is so, why? -  no    Have you seen any other physician or provider since your last visit? Yes - Records Obtained  Have you had any other diagnostic tests since your last visit? No  Have you been seen in the emergency room and/or had an admission to a hospital since we last saw you? No  Have you had your routine dental cleaning in the past 6 months? yes -     Have you activated your [employer] account? If not, what are your barriers?  Yes     Patient Care Team:  Douglas Santiago MD as PCP - [facility]  Douglas Santiago MD as PCP [facility]  Nolan Wakefield MD as Consulting Physician (Internal Medicine)    Medical History Review  Past Medical, Family, and Social History reviewed and does contribute to the patient presenting condition    Health Maintenance   Topic Date Due    DTaP/Tdap/Td vaccine (1 - Tdap) 10/21/1949    Shingles Vaccine (1 of 2) 10/21/1980    Pneumococcal 65+ years Vaccine (2 of 2 - PPSV23) 10/13/2016    Flu vaccine (Season Ended) 09/01/2019

## 2019-08-03 ENCOUNTER — HOSPITAL ENCOUNTER (OUTPATIENT)
Age: 84
Discharge: HOME | End: 2019-08-03
Payer: MEDICARE

## 2019-08-03 LAB
ALBUMIN SERPL-MCNC: 3.7 G/DL (ref 3.5–5.2)
ALBUMIN/GLOBULIN RATIO: 1.2 (ref 1–2.5)
ALP BLD-CCNC: 120 U/L (ref 40–129)
ALT SERPL-CCNC: 32 U/L (ref 5–41)
ANION GAP SERPL CALCULATED.3IONS-SCNC: 8 MMOL/L (ref 9–17)
AST SERPL-CCNC: 45 U/L
BILIRUB SERPL-MCNC: 0.49 MG/DL (ref 0.3–1.2)
BUN BLDV-MCNC: 25 MG/DL (ref 8–23)
BUN/CREAT BLD: ABNORMAL (ref 9–20)
CALCIUM SERPL-MCNC: 9.1 MG/DL (ref 8.6–10.4)
CHLORIDE BLD-SCNC: 105 MMOL/L (ref 98–107)
CHOLESTEROL, FASTING: 143 MG/DL
CHOLESTEROL/HDL RATIO: 1.7
CO2: 26 MMOL/L (ref 20–31)
CREAT SERPL-MCNC: 1.47 MG/DL (ref 0.7–1.2)
GFR AFRICAN AMERICAN: 55 ML/MIN
GFR NON-AFRICAN AMERICAN: 45 ML/MIN
GFR SERPL CREATININE-BSD FRML MDRD: ABNORMAL ML/MIN/{1.73_M2}
GFR SERPL CREATININE-BSD FRML MDRD: ABNORMAL ML/MIN/{1.73_M2}
GLUCOSE BLD-MCNC: 89 MG/DL (ref 70–99)
HCT VFR BLD CALC: 48.1 % (ref 40.7–50.3)
HDLC SERPL-MCNC: 85 MG/DL
HEMOGLOBIN: 14.2 G/DL (ref 13–17)
LDL CHOLESTEROL: 49 MG/DL (ref 0–130)
MCH RBC QN AUTO: 26.1 PG (ref 25.2–33.5)
MCHC RBC AUTO-ENTMCNC: 29.5 G/DL (ref 28.4–34.8)
MCV RBC AUTO: 88.3 FL (ref 82.6–102.9)
NRBC AUTOMATED: 0 PER 100 WBC
PDW BLD-RTO: 15.2 % (ref 11.8–14.4)
PLATELET # BLD: 125 K/UL (ref 138–453)
PMV BLD AUTO: 11.8 FL (ref 8.1–13.5)
POTASSIUM SERPL-SCNC: 4.3 MMOL/L (ref 3.7–5.3)
RBC # BLD: 5.45 M/UL (ref 4.21–5.77)
SODIUM BLD-SCNC: 139 MMOL/L (ref 135–144)
TOTAL CK: 264 U/L (ref 39–308)
TOTAL PROTEIN: 6.8 G/DL (ref 6.4–8.3)
TRIGLYCERIDE, FASTING: 45 MG/DL
VLDLC SERPL CALC-MCNC: NORMAL MG/DL (ref 1–30)
WBC # BLD: 3.4 K/UL (ref 3.5–11.3)

## 2019-08-03 PROCEDURE — 80053 COMPREHEN METABOLIC PANEL: CPT

## 2019-08-03 PROCEDURE — 80061 LIPID PANEL: CPT

## 2019-08-03 PROCEDURE — 85027 COMPLETE CBC AUTOMATED: CPT

## 2019-08-03 PROCEDURE — 36415 COLL VENOUS BLD VENIPUNCTURE: CPT

## 2019-08-03 PROCEDURE — 82550 ASSAY OF CK (CPK): CPT

## 2019-11-05 ENCOUNTER — OFFICE VISIT (OUTPATIENT)
Dept: INTERNAL MEDICINE CLINIC | Age: 84
End: 2019-11-05
Payer: MEDICARE

## 2019-11-05 ENCOUNTER — HOSPITAL ENCOUNTER (OUTPATIENT)
Age: 84
End: 2019-11-05
Payer: MEDICARE

## 2019-11-05 ENCOUNTER — HOSPITAL ENCOUNTER (OUTPATIENT)
Dept: GENERAL RADIOLOGY | Age: 84
Discharge: HOME | End: 2019-11-07
Payer: MEDICARE

## 2019-11-05 ENCOUNTER — HOSPITAL ENCOUNTER (OUTPATIENT)
Age: 84
Setting detail: SPECIMEN
Discharge: HOME | End: 2019-11-05
Payer: MEDICARE

## 2019-11-05 VITALS
OXYGEN SATURATION: 96 % | HEART RATE: 60 BPM | DIASTOLIC BLOOD PRESSURE: 52 MMHG | HEIGHT: 69 IN | BODY MASS INDEX: 26.84 KG/M2 | SYSTOLIC BLOOD PRESSURE: 100 MMHG | TEMPERATURE: 97.7 F | WEIGHT: 181.2 LBS

## 2019-11-05 DIAGNOSIS — M10.9 ARTHRITIS OF LEFT ELBOW DUE TO GOUT: Primary | ICD-10-CM

## 2019-11-05 DIAGNOSIS — M10.9 ARTHRITIS OF LEFT ELBOW DUE TO GOUT: ICD-10-CM

## 2019-11-05 LAB — URIC ACID: 4.8 MG/DL (ref 3.4–7)

## 2019-11-05 PROCEDURE — 99213 OFFICE O/P EST LOW 20 MIN: CPT | Performed by: INTERNAL MEDICINE

## 2019-11-05 PROCEDURE — 73070 X-RAY EXAM OF ELBOW: CPT

## 2019-11-05 RX ORDER — PREDNISONE 10 MG/1
10 TABLET ORAL
Qty: 15 TABLET | Refills: 0 | Status: SHIPPED | OUTPATIENT
Start: 2019-11-05 | End: 2020-10-08 | Stop reason: SDUPTHER

## 2019-11-05 RX ORDER — COLCHICINE 0.6 MG/1
0.6 TABLET ORAL DAILY
Qty: 10 TABLET | Refills: 0 | Status: SHIPPED | OUTPATIENT
Start: 2019-11-05 | End: 2019-11-05 | Stop reason: SDUPTHER

## 2019-11-05 RX ORDER — COLCHICINE 0.6 MG/1
0.6 TABLET ORAL DAILY
Qty: 10 TABLET | Refills: 0 | Status: SHIPPED | OUTPATIENT
Start: 2019-11-05 | End: 2020-07-07 | Stop reason: ALTCHOICE

## 2019-11-05 RX ORDER — PREDNISONE 10 MG/1
10 TABLET ORAL
Qty: 15 TABLET | Refills: 0 | Status: SHIPPED | OUTPATIENT
Start: 2019-11-05 | End: 2019-11-05 | Stop reason: SDUPTHER

## 2019-11-05 RX ORDER — DOXAZOSIN MESYLATE 4 MG/1
TABLET ORAL
COMMUNITY
Start: 2019-08-05 | End: 2021-01-15

## 2020-01-20 ENCOUNTER — OFFICE VISIT (OUTPATIENT)
Dept: INTERNAL MEDICINE CLINIC | Age: 85
End: 2020-01-20
Payer: MEDICARE

## 2020-01-20 VITALS
DIASTOLIC BLOOD PRESSURE: 64 MMHG | WEIGHT: 179.6 LBS | BODY MASS INDEX: 26.6 KG/M2 | RESPIRATION RATE: 16 BRPM | HEIGHT: 69 IN | TEMPERATURE: 97.9 F | HEART RATE: 60 BPM | SYSTOLIC BLOOD PRESSURE: 136 MMHG

## 2020-01-20 PROCEDURE — G0009 ADMIN PNEUMOCOCCAL VACCINE: HCPCS | Performed by: INTERNAL MEDICINE

## 2020-01-20 PROCEDURE — 90732 PPSV23 VACC 2 YRS+ SUBQ/IM: CPT | Performed by: INTERNAL MEDICINE

## 2020-01-20 PROCEDURE — 99213 OFFICE O/P EST LOW 20 MIN: CPT | Performed by: INTERNAL MEDICINE

## 2020-01-20 SDOH — ECONOMIC STABILITY: FOOD INSECURITY: WITHIN THE PAST 12 MONTHS, YOU WORRIED THAT YOUR FOOD WOULD RUN OUT BEFORE YOU GOT MONEY TO BUY MORE.: NEVER TRUE

## 2020-01-20 SDOH — ECONOMIC STABILITY: INCOME INSECURITY: HOW HARD IS IT FOR YOU TO PAY FOR THE VERY BASICS LIKE FOOD, HOUSING, MEDICAL CARE, AND HEATING?: NOT HARD AT ALL

## 2020-01-20 SDOH — ECONOMIC STABILITY: TRANSPORTATION INSECURITY
IN THE PAST 12 MONTHS, HAS LACK OF TRANSPORTATION KEPT YOU FROM MEETINGS, WORK, OR FROM GETTING THINGS NEEDED FOR DAILY LIVING?: NO

## 2020-01-20 SDOH — ECONOMIC STABILITY: FOOD INSECURITY: WITHIN THE PAST 12 MONTHS, THE FOOD YOU BOUGHT JUST DIDN'T LAST AND YOU DIDN'T HAVE MONEY TO GET MORE.: NEVER TRUE

## 2020-01-20 SDOH — ECONOMIC STABILITY: TRANSPORTATION INSECURITY
IN THE PAST 12 MONTHS, HAS THE LACK OF TRANSPORTATION KEPT YOU FROM MEDICAL APPOINTMENTS OR FROM GETTING MEDICATIONS?: NO

## 2020-01-20 ASSESSMENT — PATIENT HEALTH QUESTIONNAIRE - PHQ9
SUM OF ALL RESPONSES TO PHQ QUESTIONS 1-9: 0
SUM OF ALL RESPONSES TO PHQ9 QUESTIONS 1 & 2: 0
SUM OF ALL RESPONSES TO PHQ QUESTIONS 1-9: 0
1. LITTLE INTEREST OR PLEASURE IN DOING THINGS: 0
2. FEELING DOWN, DEPRESSED OR HOPELESS: 0

## 2020-01-20 NOTE — PROGRESS NOTES
Devorah Donald is a 80 y.o. male who presents for   Chief Complaint   Patient presents with    Hypertension     check up, no recent labs, denies complaints    Hyperlipidemia     as above    Chest Congestion     had a cold late Dec 2019 and feels still a bit of chest congestion, cough productive- mucous white    and follow up of chronic medical problems. Patient Active Problem List   Diagnosis    Hypercholesteremia    Sleep apnea    Prostate cancer ([facility] [address]    CRF (chronic renal failure)    OA (osteoarthritis) of knee    Hypercholesteremia    Impotence    HTN (hypertension)    Closed fracture of shaft of metacarpal bone    Primary osteoarthritis of right hip     HPI  Here for follow-up on blood pressure and arthritis denies any new complaints    Current Outpatient Medications   Medication Sig Dispense Refill    doxazosin (CARDURA) 4 MG tablet       Compression Stockings MISC by Does not apply route 20-30 mmHg calf length 1 each 0    apixaban (ELIQUIS) 2.5 MG TABS tablet Take 1 tablet by mouth 2 times daily 7 tablet 0    famotidine (PEPCID) 20 MG tablet Take 1 tablet by mouth daily 7 tablet 0    niacin (NIASPAN) 1000 MG extended release tablet Take 1 tablet by mouth 2 times daily 180 tablet 3    rosuvastatin (CRESTOR) 40 MG tablet Take 1 tablet by mouth every evening 90 tablet 3    amLODIPine (NORVASC) 5 MG tablet Take 1 tablet by mouth daily 30 tablet 3    aspirin 81 MG tablet Take 1 tablet by mouth daily 30 tablet 2    FISH OIL Take 1 capsule by mouth 2 times daily       colchicine (COLCRYS) 0.6 MG tablet Take 1 tablet by mouth daily for 10 days 10 tablet 0     No current facility-administered medications for this visit.         Allergies   Allergen Reactions    Ace Inhibitors     Other      Beta-blockers slow heart rate    Timolol      Eye drops slow heart rate       Past Medical History:   Diagnosis Date    BPH (benign prostatic hyperplasia)     secondary to reynolds    CAD (coronary artery dysphoric mood, sleep disturbances, nervous or anxious, or decreased concentration   All other review of systems was negative    Objective  Physical Examination:    Nursing note reviewed    /64 (Site: Left Upper Arm, Position: Sitting, Cuff Size: Large Adult)   Pulse 60   Temp 97.9 °F (36.6 °C) (Infrared)   Resp 16   Ht 5' 9\" (1.753 m)   Wt 179 lb 9.6 oz (81.5 kg)   BMI 26.52 kg/m²   BP Readings from Last 3 Encounters:   01/20/20 136/64   11/05/19 (!) 100/52   06/05/19 116/70         Constitutional:  Joni Wilde is oriented to place, person and time ,appears well-developed and well-nourished  HEENT:  Atraumatic and normocephalic, external ears normal bilaterally, nose normal no oropharyngeal exudate and is clear and moist  Eyes:  EOCM normal; conjunctivae normal; PERRLA bilaterally  Neck:  Normal range of motion, neck supple, no JVD and no thyromegaly  Cardiovascular:  RRR, normal heart sounds and intact distal pulses  Pulmonary:  effort normal and breath sounds normal bilaterally,no wheezes or rales, no respiratory distress  Abdominal:  Soft, non-tender; normal bowel sounds, no masses  Musculoskeletal: Limited range of motion and no edema or tenderness bilaterally  No lymphadenopathy  Neurological:  alert, oriented, and normal reflexes bilaterally  Skin: warm and dry  Psychiatric:  normal mood and effect; behavior normal.    Labs:   Lab Results   Component Value Date    LABA1C 5.8 02/09/2018     Lab Results   Component Value Date    CHOL 145 09/03/2016     Lab Results   Component Value Date    HDL 85 08/03/2019     Lab Results   Component Value Date    LDLCALC 64 05/16/2016     Lab Results   Component Value Date    TRIG 42 09/03/2016     No components found for: [place], [place]  Lab Results   Component Value Date    WBC 3.4 (L) 08/03/2019    HGB 14.2 08/03/2019    HCT 48.1 08/03/2019    MCV 88.3 08/03/2019     (L) 08/03/2019     Lab Results   Component Value Date    INR 1.2 10/08/2015    PROTIME 12.5 (H) Electronically signed by Damien Mcdermott MD on 1/20/2020 at 2:57 PM    This note is created with a voice recognition program and while intend to generate a document that accurately reflects the content of the visit, no guarantee can be provided that every mistake has been identified and corrected by editing.

## 2020-02-19 ENCOUNTER — HOSPITAL ENCOUNTER (OUTPATIENT)
Age: 85
Setting detail: SPECIMEN
Discharge: HOME | End: 2020-02-19
Payer: MEDICARE

## 2020-02-19 LAB
ALBUMIN SERPL-MCNC: 3.5 G/DL (ref 3.5–5.2)
ALBUMIN/GLOBULIN RATIO: 1.1 (ref 1–2.5)
ALP BLD-CCNC: 98 U/L (ref 40–129)
ALT SERPL-CCNC: 25 U/L (ref 5–41)
ANION GAP SERPL CALCULATED.3IONS-SCNC: 11 MMOL/L (ref 9–17)
AST SERPL-CCNC: 40 U/L
BILIRUB SERPL-MCNC: 0.49 MG/DL (ref 0.3–1.2)
BNP INTERPRETATION: ABNORMAL
BUN BLDV-MCNC: 23 MG/DL (ref 8–23)
BUN/CREAT BLD: ABNORMAL (ref 9–20)
CALCIUM SERPL-MCNC: 9.4 MG/DL (ref 8.6–10.4)
CHLORIDE BLD-SCNC: 107 MMOL/L (ref 98–107)
CHOLESTEROL, FASTING: 142 MG/DL
CHOLESTEROL/HDL RATIO: 1.7
CO2: 24 MMOL/L (ref 20–31)
CREAT SERPL-MCNC: 1.57 MG/DL (ref 0.7–1.2)
ESTIMATED AVERAGE GLUCOSE: 123 MG/DL
GFR AFRICAN AMERICAN: 51 ML/MIN
GFR NON-AFRICAN AMERICAN: 42 ML/MIN
GFR SERPL CREATININE-BSD FRML MDRD: ABNORMAL ML/MIN/{1.73_M2}
GFR SERPL CREATININE-BSD FRML MDRD: ABNORMAL ML/MIN/{1.73_M2}
GLUCOSE FASTING: 91 MG/DL (ref 70–99)
HBA1C MFR BLD: 5.9 % (ref 4–6)
HCT VFR BLD CALC: 44.5 % (ref 40.7–50.3)
HDLC SERPL-MCNC: 84 MG/DL
HEMOGLOBIN: 13.9 G/DL (ref 13–17)
IRON SATURATION: 28 % (ref 20–55)
IRON: 50 UG/DL (ref 59–158)
LDL CHOLESTEROL: 50 MG/DL (ref 0–130)
MCH RBC QN AUTO: 27 PG (ref 25.2–33.5)
MCHC RBC AUTO-ENTMCNC: 31.2 G/DL (ref 28.4–34.8)
MCV RBC AUTO: 86.6 FL (ref 82.6–102.9)
NRBC AUTOMATED: 0 PER 100 WBC
PDW BLD-RTO: 15.5 % (ref 11.8–14.4)
PLATELET # BLD: 125 K/UL (ref 138–453)
PMV BLD AUTO: 12.2 FL (ref 8.1–13.5)
POTASSIUM SERPL-SCNC: 4 MMOL/L (ref 3.7–5.3)
PRO-BNP: 1795 PG/ML
PROSTATE SPECIFIC ANTIGEN: <0.01 UG/L
RBC # BLD: 5.14 M/UL (ref 4.21–5.77)
SODIUM BLD-SCNC: 142 MMOL/L (ref 135–144)
THYROXINE, FREE: 0.95 NG/DL (ref 0.93–1.7)
TOTAL CK: 317 U/L (ref 39–308)
TOTAL IRON BINDING CAPACITY: 180 UG/DL (ref 250–450)
TOTAL PROTEIN: 6.6 G/DL (ref 6.4–8.3)
TRIGLYCERIDE, FASTING: 38 MG/DL
TSH SERPL DL<=0.05 MIU/L-ACNC: 3.88 MIU/L (ref 0.3–5)
UNSATURATED IRON BINDING CAPACITY: 130 UG/DL (ref 112–347)
URIC ACID: 5.6 MG/DL (ref 3.4–7)
VITAMIN B-12: 916 PG/ML (ref 232–1245)
VITAMIN D 25-HYDROXY: 22.3 NG/ML (ref 30–100)
VLDLC SERPL CALC-MCNC: NORMAL MG/DL (ref 1–30)
WBC # BLD: 3.4 K/UL (ref 3.5–11.3)

## 2020-03-25 PROBLEM — E78.00 HYPERCHOLESTEREMIA: Status: RESOLVED | Noted: 2020-03-25 | Resolved: 2020-03-24

## 2020-04-09 ENCOUNTER — TELEPHONE (OUTPATIENT)
Dept: INTERNAL MEDICINE CLINIC | Age: 85
End: 2020-04-09

## 2020-05-12 ENCOUNTER — HOSPITAL ENCOUNTER (OUTPATIENT)
Age: 85
Setting detail: SPECIMEN
Discharge: HOME | End: 2020-05-12
Payer: MEDICARE

## 2020-05-12 LAB
ALBUMIN SERPL-MCNC: 3.5 G/DL (ref 3.5–5.2)
ALBUMIN/GLOBULIN RATIO: 1.1 (ref 1–2.5)
ALP BLD-CCNC: 91 U/L (ref 40–129)
ALT SERPL-CCNC: 24 U/L (ref 5–41)
ANION GAP SERPL CALCULATED.3IONS-SCNC: 13 MMOL/L (ref 9–17)
AST SERPL-CCNC: 36 U/L
BILIRUB SERPL-MCNC: 0.65 MG/DL (ref 0.3–1.2)
BNP INTERPRETATION: ABNORMAL
BUN BLDV-MCNC: 28 MG/DL (ref 8–23)
BUN/CREAT BLD: ABNORMAL (ref 9–20)
CALCIUM SERPL-MCNC: 9.6 MG/DL (ref 8.6–10.4)
CHLORIDE BLD-SCNC: 108 MMOL/L (ref 98–107)
CO2: 23 MMOL/L (ref 20–31)
CREAT SERPL-MCNC: 1.74 MG/DL (ref 0.7–1.2)
GFR AFRICAN AMERICAN: 45 ML/MIN
GFR NON-AFRICAN AMERICAN: 37 ML/MIN
GFR SERPL CREATININE-BSD FRML MDRD: ABNORMAL ML/MIN/{1.73_M2}
GFR SERPL CREATININE-BSD FRML MDRD: ABNORMAL ML/MIN/{1.73_M2}
GLUCOSE FASTING: 90 MG/DL (ref 70–99)
HCT VFR BLD CALC: 45.5 % (ref 40.7–50.3)
HEMOGLOBIN: 14.2 G/DL (ref 13–17)
INR BLD: 1.2
MCH RBC QN AUTO: 27.2 PG (ref 25.2–33.5)
MCHC RBC AUTO-ENTMCNC: 31.2 G/DL (ref 28.4–34.8)
MCV RBC AUTO: 87 FL (ref 82.6–102.9)
NRBC AUTOMATED: 0 PER 100 WBC
PDW BLD-RTO: 14.7 % (ref 11.8–14.4)
PLATELET # BLD: ABNORMAL K/UL (ref 138–453)
PLATELET, FLUORESCENCE: 121 K/UL (ref 138–453)
PLATELET, IMMATURE FRACTION: 3.6 % (ref 1.1–10.3)
PMV BLD AUTO: ABNORMAL FL (ref 8.1–13.5)
POTASSIUM SERPL-SCNC: 4.3 MMOL/L (ref 3.7–5.3)
PRO-BNP: 1175 PG/ML
PROTHROMBIN TIME: 12.1 SEC (ref 9–12)
RBC # BLD: 5.23 M/UL (ref 4.21–5.77)
SODIUM BLD-SCNC: 144 MMOL/L (ref 135–144)
TOTAL PROTEIN: 6.7 G/DL (ref 6.4–8.3)
WBC # BLD: 4.1 K/UL (ref 3.5–11.3)

## 2020-07-07 ENCOUNTER — TELEPHONE (OUTPATIENT)
Dept: INTERNAL MEDICINE CLINIC | Age: 85
End: 2020-07-07

## 2020-07-07 ENCOUNTER — OFFICE VISIT (OUTPATIENT)
Dept: INTERNAL MEDICINE CLINIC | Age: 85
End: 2020-07-07
Payer: MEDICARE

## 2020-07-07 VITALS
HEIGHT: 69 IN | TEMPERATURE: 97 F | SYSTOLIC BLOOD PRESSURE: 116 MMHG | DIASTOLIC BLOOD PRESSURE: 60 MMHG | RESPIRATION RATE: 16 BRPM | HEART RATE: 68 BPM | WEIGHT: 174 LBS | BODY MASS INDEX: 25.77 KG/M2

## 2020-07-07 PROCEDURE — G0438 PPPS, INITIAL VISIT: HCPCS | Performed by: INTERNAL MEDICINE

## 2020-07-07 RX ORDER — MULTIVITAMIN WITH IRON
250 TABLET ORAL DAILY
COMMUNITY
End: 2021-01-15

## 2020-07-07 ASSESSMENT — LIFESTYLE VARIABLES
HOW OFTEN DO YOU HAVE SIX OR MORE DRINKS ON ONE OCCASION: 0
AUDIT-C TOTAL SCORE: 1
HOW OFTEN DO YOU HAVE A DRINK CONTAINING ALCOHOL: 1
HOW MANY STANDARD DRINKS CONTAINING ALCOHOL DO YOU HAVE ON A TYPICAL DAY: 0

## 2020-07-07 ASSESSMENT — PATIENT HEALTH QUESTIONNAIRE - PHQ9
SUM OF ALL RESPONSES TO PHQ QUESTIONS 1-9: 0
SUM OF ALL RESPONSES TO PHQ QUESTIONS 1-9: 0

## 2020-07-07 NOTE — PROGRESS NOTES
Medicare Annual Wellness Visit  Name: Dasia Navarro Date: 2020   MRN: [id number] Sex: Male   Age: 80 y.o. Ethnicity: Non-/Non    : 10/21/1930 Race: Sherry Swartz is here for Medicare AWV; Shoulder Pain (right shoulder- sees Dr Henna Romero will call if worse); and Other (occas gets muscle cramps in calves at night)    Screenings for behavioral, psychosocial and functional/safety risks, and cognitive dysfunction are all negative except as indicated below. These results, as well as other patient data from the [address] form, are documented in Flowsheets linked to this Encounter. Allergies   Allergen Reactions    Ace Inhibitors     Other      Beta-blockers slow heart rate    Timolol      Eye drops slow heart rate       Prior to Visit Medications    Medication Sig Taking?  Authorizing Provider   doxazosin (CARDURA) 4 MG tablet  Yes Historical Provider, MD   Compression Stockings MISC by Does not apply route 20-30 mmHg calf length Yes Jaime Tate MD   apixaban (ELIQUIS) 2.5 MG TABS tablet Take 1 tablet by mouth 2 times daily Yes Jaime Tate MD   famotidine (PEPCID) 20 MG tablet Take 1 tablet by mouth daily Yes Jaime Tate MD   niacin (NIASPAN) 1000 MG extended release tablet Take 1 tablet by mouth 2 times daily Yes Jaime Tate MD   rosuvastatin (CRESTOR) 40 MG tablet Take 1 tablet by mouth every evening Yes Jaime Tate MD   amLODIPine (NORVASC) 5 MG tablet Take 1 tablet by mouth daily Yes Jaime Tate MD   aspirin 81 MG tablet Take 1 tablet by mouth daily Yes Jaime Tate MD   FISH OIL Take 1 capsule by mouth 2 times daily  Yes Historical Provider, MD       Past Medical History:   Diagnosis Date    BPH (benign prostatic hyperplasia)     secondary to reynolds    CAD (coronary artery disease) 2005    CRF (chronic renal failure)     patient denies kidney problems at pre-testing 2015    DVD (dissociated vertical deviation)     Gout     H/O cardiac catheterization 1/18/05    stents     HTN (hypertension)     Hx of blood clots     left leg (over 23 yrs ago)    Hypercholesteremia     Impotence     Irregular heartbeat     Nodular prostate     -bx Dr. Randy Silva 8/96 and focal inflammation 6/97    OA (osteoarthritis) of knee     Prostate cancer ([facility] [address] 1/06    Unspecified sleep apnea     no machine       Past Surgical History:   Procedure Laterality Date    CORONARY ANGIOPLASTY      CYSTOPLASTY N/A     urethral dilation 10/93 Dr Denise Junior Left     cataract    HAND SURGERY Right 1/08/15    Dr. Elizabeth Hamilton did right ring and little finger metacarpophalangeal joint extension    JOINT REPLACEMENT      PROSTATE SURGERY      seed implant    REVISION TOTAL HIP ARTHROPLASTY Left 05/07    Dr Karel Srivastava Right 10/20/2015       Family History   Problem Relation Age of Onset    Coronary Art Dis Mother     Hypertension Mother     Cancer Brother        CareTeam (Including outside providers/suppliers regularly involved in providing care):   Patient Care Team:  Martin Correa MD as PCP - General  Martin Correa MD as PCP - [facility] Empan[facility] Provider  Ryan Ceballos MD as Consulting Physician (Internal Medicine)    Wt Readings from Last 3 Encounters:   07/07/20 174 lb (78.9 kg)   01/20/20 179 lb 9.6 oz (81.5 kg)   11/05/19 181 lb 3.2 oz (82.2 kg)     Vitals:    07/07/20 0902   BP: 116/60   Site: Left Upper Arm   Position: Sitting   Cuff Size: Medium Adult   Pulse: 68   Resp: 16   Temp: 97 °F (36.1 °C)   TempSrc: Infrared   Weight: 174 lb (78.9 kg)   Height: 5' 9\" (1.753 m)     Body mass index is 25.7 kg/m². Based upon direct observation of the patient, evaluation of cognition reveals remote memory intact, recent memory impaired. Patient's complete Health Risk Assessment and screening values have been reviewed and are found in Flowsheets.  The following problems were reviewed today and where indicated follow up appointments were made and/or referrals ordered. Co muscle cramps in legs- per DR Jo-Ann Galarza start on OTC magnesium 250mg daily, repeat BMP and repeat magnesium level in 4 weeks. Pt understands    Positive Risk Factor Screenings with Interventions:     Cognitive: Words recalled: 0 Words Recalled  Clock Drawing Test (CDT) Score: Normal  Total Score Interpretation: Positive Mini-Cog  Did the patient refuse to take the cognition test?: No  Cognitive Impairment Interventions:  · Neurology referral ordered    General Health:  General  In general, how would you say your health is?: Very Good  In the past 7 days, have you experienced any of the following? New or Increased Pain, New or Increased Fatigue, Loneliness, Social Isolation, Stress or Anger?: (!) New or Increased Pain  Do you get the social and emotional support that you need?: Yes  Do you have a Living Will?: Yes  General Health Risk Interventions:  · Pain issues: right shoulder pain.  Maryellen Womack and will call for appt if continues    Hearing/Vision:  No exam data present  Hearing/Vision  Do you or your family notice any trouble with your hearing?: (!) Yes  Do you have difficulty driving, watching TV, or doing any of your daily activities because of your eyesight?: No  Have you had an eye exam within the past year?: (!) No  Hearing/Vision Interventions:  · Hearing concerns:  patint has hearing aids, encouraged to wear more to try to get used to them   · Encouraged to get an eye exam    Safety:  Safety  Do you have working smoke detectors?: Yes  Have all throw rugs been removed or fastened?: (!) No  Do you have non-slip mats or surfaces in all bathtubs/showers?: (!) No  Do all of your stairways have a railing or banister?: Yes  Are your doorways, halls and stairs free of clutter?: Yes  Do you always fasten your seatbelt when you are in a car?: Yes  Safety Interventions:  · Home safety tips provided    Personalized Preventive Plan   Current Health Maintenance Status  Immunization History   Administered Date(s) Administered    Influenza Vaccine, unspecified formulation 10/13/2015, 09/21/2016    Influenza Virus Vaccine 11/15/2017    Pneumococcal Conjugate 13-valent ([id number]) 10/13/2015    Pneumococcal Conjugate 7-valent (Prevnar7) 06/17/1993, 10/09/2000    Pneumococcal Polysaccharide ([id number]) 01/20/2020    Tetanus 06/13/1993, 03/01/2004        Health Maintenance   Topic Date Due    DTaP/Tdap/Td vaccine (1 - Tdap) 10/21/1949    Shingles Vaccine (1 of 2) 10/21/1980    Annual Wellness Visit (AWV)  06/05/2019    Flu vaccine (1) 09/01/2020    Lipid screen  02/19/2021    PSA counseling  02/19/2021    Pneumococcal 65+ years Vaccine  Completed    Hepatitis A vaccine  Aged Out    Hepatitis B vaccine  Aged Out    Hib vaccine  Aged Out    Meningococcal (ACWY) vaccine  Aged Out     Recommendations for [employer] Due: see orders and patient instructions/AVS.  . Recommended screening schedule for the next 5-10 years is provided to the patient in written form: see Patient Instructions/AVS.    INiesha RN, 7/7/2020, performed the documented evaluation under the direct supervision of the attending physician.

## 2020-07-07 NOTE — PATIENT INSTRUCTIONS
Personalized Preventive Plan for Deisy Kruger - 7/7/2020  Medicare offers a range of preventive health benefits. Some of the tests and screenings are paid in full while other may be subject to a deductible, co-insurance, and/or copay. Some of these benefits include a comprehensive review of your medical history including lifestyle, illnesses that may run in your family, and various assessments and screenings as appropriate. After reviewing your medical record and screening and assessments performed today your provider may have ordered immunizations, labs, imaging, and/or referrals for you. A list of these orders (if applicable) as well as your Preventive Care list are included within your After Visit Summary for your review. Other Preventive Recommendations:    · A preventive eye exam performed by an eye specialist is recommended every 1-2 years to screen for glaucoma; cataracts, macular degeneration, and other eye disorders. · A preventive dental visit is recommended every 6 months. · Try to get at least 150 minutes of exercise per week or 10,000 steps per day on a pedometer . · Order or download the FREE \"Exercise & Physical Activity: Your Everyday Guide\" from [employer]. Call 0-643.425.4575 or search [employer] online. · You need 9786-0453 mg of calcium and 9459-2693 IU of vitamin D per day. It is possible to meet your calcium requirement with diet alone, but a vitamin D supplement is usually necessary to meet this goal.  · When exposed to the sun, use a sunscreen that protects against both UVA and UVB radiation with an SPF of 30 or greater. Reapply every 2 to 3 hours or after sweating, drying off with a towel, or swimming. · Always wear a seat belt when traveling in a car. Always wear a helmet when riding a bicycle or motorcycle.

## 2020-07-07 NOTE — TELEPHONE ENCOUNTER
Discussed with Marylen Jamaica  Refer to neurology for evaluation of memory issues and his MMSE reviewed and patient has 0 recall  Also start on magnesium 250 mg daily orally for muscle cramps and repeat BMP and magnesium levels in 4 weeks

## 2020-10-08 ENCOUNTER — HOSPITAL ENCOUNTER (OUTPATIENT)
Age: 85
Setting detail: SPECIMEN
Discharge: HOME | End: 2020-10-08
Payer: MEDICARE

## 2020-10-08 ENCOUNTER — OFFICE VISIT (OUTPATIENT)
Dept: INTERNAL MEDICINE CLINIC | Age: 85
End: 2020-10-08
Payer: MEDICARE

## 2020-10-08 VITALS
TEMPERATURE: 97.3 F | HEIGHT: 69 IN | SYSTOLIC BLOOD PRESSURE: 126 MMHG | DIASTOLIC BLOOD PRESSURE: 64 MMHG | RESPIRATION RATE: 16 BRPM | WEIGHT: 188.2 LBS | HEART RATE: 56 BPM | BODY MASS INDEX: 27.88 KG/M2

## 2020-10-08 PROBLEM — I73.9 PERIPHERAL VASCULAR DISEASE (HCC): Status: ACTIVE | Noted: 2020-10-08

## 2020-10-08 LAB
HCT VFR BLD CALC: 42.1 % (ref 40.7–50.3)
HEMOGLOBIN: 13.3 G/DL (ref 13–17)
MAGNESIUM: 2 MG/DL (ref 1.6–2.6)
MCH RBC QN AUTO: 26.7 PG (ref 25.2–33.5)
MCHC RBC AUTO-ENTMCNC: 31.6 G/DL (ref 28.4–34.8)
MCV RBC AUTO: 84.4 FL (ref 82.6–102.9)
NRBC AUTOMATED: 0 PER 100 WBC
PDW BLD-RTO: 15 % (ref 11.8–14.4)
PLATELET # BLD: 145 K/UL (ref 138–453)
PMV BLD AUTO: 12.2 FL (ref 8.1–13.5)
RBC # BLD: 4.99 M/UL (ref 4.21–5.77)
TSH SERPL DL<=0.05 MIU/L-ACNC: 2.51 MIU/L (ref 0.3–5)
WBC # BLD: 5.2 K/UL (ref 3.5–11.3)

## 2020-10-08 PROCEDURE — 99214 OFFICE O/P EST MOD 30 MIN: CPT | Performed by: INTERNAL MEDICINE

## 2020-10-08 RX ORDER — PREDNISONE 1 MG/1
5 TABLET ORAL 2 TIMES DAILY
Qty: 10 TABLET | Refills: 0 | Status: SHIPPED | OUTPATIENT
Start: 2020-10-08 | End: 2020-10-13

## 2020-10-08 RX ORDER — PREDNISONE 10 MG/1
5 TABLET ORAL 2 TIMES DAILY
Qty: 10 TABLET | Refills: 0 | Status: SHIPPED | OUTPATIENT
Start: 2020-10-08 | End: 2020-10-08

## 2020-10-08 RX ORDER — COLCHICINE 0.6 MG/1
0.6 TABLET ORAL DAILY
Qty: 10 TABLET | Refills: 0 | Status: SHIPPED | OUTPATIENT
Start: 2020-10-08 | End: 2021-04-20 | Stop reason: ALTCHOICE

## 2020-10-08 RX ORDER — COLCHICINE 0.6 MG/1
0.6 TABLET ORAL DAILY
Qty: 10 TABLET | Refills: 0 | Status: SHIPPED
Start: 2020-10-08 | End: 2020-10-08 | Stop reason: CLARIF

## 2020-10-08 RX ORDER — PREDNISONE 1 MG/1
5 TABLET ORAL 2 TIMES DAILY
Qty: 10 TABLET | Refills: 0 | Status: SHIPPED
Start: 2020-10-08 | End: 2020-10-08 | Stop reason: SDUPTHER

## 2020-10-08 ASSESSMENT — PATIENT HEALTH QUESTIONNAIRE - PHQ9
SUM OF ALL RESPONSES TO PHQ QUESTIONS 1-9: 0
SUM OF ALL RESPONSES TO PHQ QUESTIONS 1-9: 0
1. LITTLE INTEREST OR PLEASURE IN DOING THINGS: 0
SUM OF ALL RESPONSES TO PHQ9 QUESTIONS 1 & 2: 0
2. FEELING DOWN, DEPRESSED OR HOPELESS: 0

## 2020-10-08 NOTE — PROGRESS NOTES
Leanna Novoa is a 80 y.o. male who presents for   Chief Complaint   Patient presents with    Leg Swelling     bilat leg swelling for 1 week    Other     leg cramps better since on magnesium- did not get BMP and mag rechecked    Memory Loss     sees neurology next week    Gout     asking for med for gout    Other     had a cough and went to ER- was also tested for Covid 19 but was negative- was tested at [facility]    and follow up of chronic medical problems.   Patient Active Problem List   Diagnosis    Sleep apnea    Prostate cancer ([facility] [address]    CRF (chronic renal failure)    OA (osteoarthritis) of knee    Hypercholesteremia    Impotence    HTN (hypertension)    Closed fracture of shaft of metacarpal bone    Primary osteoarthritis of right hip    Peripheral vascular disease (HCC)     HPI  Here for follow-up on blood pressure and complains of occasional leg swelling and cough is resolved and sometimes feels low in energy and wants refills on medications for gout    Current Outpatient Medications   Medication Sig Dispense Refill    predniSONE (DELTASONE) 10 MG tablet Take 0.5 tablets by mouth 2 times daily for 5 days 10 tablet 0    colchicine (COLCRYS) 0.6 MG tablet Take 1 tablet by mouth daily for 10 days 10 tablet 0    magnesium (MAGNESIUM-OXIDE) 250 MG TABS tablet Take 250 mg by mouth daily      doxazosin (CARDURA) 4 MG tablet       apixaban (ELIQUIS) 2.5 MG TABS tablet Take 1 tablet by mouth 2 times daily 7 tablet 0    famotidine (PEPCID) 20 MG tablet Take 1 tablet by mouth daily 7 tablet 0    niacin (NIASPAN) 1000 MG extended release tablet Take 1 tablet by mouth 2 times daily 180 tablet 3    rosuvastatin (CRESTOR) 40 MG tablet Take 1 tablet by mouth every evening 90 tablet 3    amLODIPine (NORVASC) 5 MG tablet Take 1 tablet by mouth daily 30 tablet 3    aspirin 81 MG tablet Take 1 tablet by mouth daily 30 tablet 2    FISH OIL Take 1 capsule by mouth 2 times daily       Compression Stockings MISC by Does not apply route 20-30 mmHg calf length (Patient not taking: Reported on 10/8/2020) 1 each 0     No current facility-administered medications for this visit.         Allergies   Allergen Reactions    Ace Inhibitors     Other      Beta-blockers slow heart rate    Timolol      Eye drops slow heart rate       Past Medical History:   Diagnosis Date    BPH (benign prostatic hyperplasia)     secondary to reynolds    CAD (coronary artery disease) 1/18/2005    CRF (chronic renal failure) 01/01    patient denies kidney problems at pre-testing 2015    DVD (dissociated vertical deviation)     Gout     H/O cardiac catheterization 1/18/05    stents     HTN (hypertension)     Hx of blood clots     left leg (over 23 yrs ago)    Hypercholesteremia     Impotence     Irregular heartbeat     Nodular prostate     -bx Dr. Gonzalo Enriquez 8/96 and focal inflammation 6/97    OA (osteoarthritis) of knee     Prostate cancer ([facility] [address] 1/06    Unspecified sleep apnea     no machine       Past Surgical History:   Procedure Laterality Date    CORONARY ANGIOPLASTY      CYSTOPLASTY N/A     urethral dilation 10/93 Dr Heather Carter Left     cataract    HAND SURGERY Right 1/08/15    Dr. Lois Chaudhari did right ring and little finger metacarpophalangeal joint extension    JOINT REPLACEMENT      PROSTATE SURGERY      seed implant    REVISION TOTAL HIP ARTHROPLASTY Left 05/07    Dr Arnold Casarez Right 10/20/2015       Family History   Problem Relation Age of Onset    Coronary Art Dis Mother     Hypertension Mother     Cancer Brother      ROS   Constitutional:  Negative for fatigue, loss of appetite and unexpected weight change   HEENT            : Negative for neck stiffness and pain, no congestion or sinus pressure   Eyes                : No visual disturbance or pain   Cardiovascular: No chest pain or palpitations or leg swelling   Respiratory      : Negative for cough, shortness of breath or wheezing   Gastrointestinal: Negative for abdominal pain, constipation or diarrhea and bloating No nausea or vomiting   Genitourinary:     No urgency or frequency, no burning or hematuria   Musculoskeletal: No arthralgias, back pain or myalgias   Skin                  : Negative for rash or erythema   Neurological    : Negative for dizziness, weakness, tremors ,light headedness or syncope   Psychiatric       : Negative for dysphoric mood, sleep disturbances, nervous or anxious, or decreased concentration   All other review of systems was negative    Objective  Physical Examination:    Nursing note reviewed    /64 (Site: Right Upper Arm, Position: Sitting, Cuff Size: Medium Adult)   Pulse 56   Temp 97.3 °F (36.3 °C) (Infrared)   Resp 16   Ht 5' 9\" (1.753 m)   Wt 188 lb 3.2 oz (85.4 kg)   BMI 27.79 kg/m²   BP Readings from Last 3 Encounters:   10/08/20 126/64   07/07/20 116/60   01/20/20 136/64         Constitutional:  Devi Fuentes is oriented to place, person and time ,appears well-developed and well-nourished  HEENT:  Atraumatic and normocephalic, external ears normal bilaterally, nose normal no oropharyngeal exudate and is clear and moist  Eyes:  EOCM normal; conjunctivae normal; PERRLA bilaterally  Neck:  Normal range of motion, neck supple, no JVD and no thyromegaly  Cardiovascular:  RRR, normal heart sounds and intact distal pulses  Pulmonary:  effort normal and breath sounds normal bilaterally,no wheezes or rales, no respiratory distress  Abdominal:  Soft, non-tender; normal bowel sounds, no masses  Musculoskeletal:  Normal range of motion and no edema or tenderness bilaterally  No lymphadenopathy  Neurological:  alert, oriented, and normal reflexes bilaterally  Skin: warm and dry  Psychiatric:  normal mood and effect; behavior normal.    Labs:   Lab Results   Component Value Date    LABA1C 5.9 02/19/2020     Lab Results   Component Value Date    CHOL 145 09/03/2016 Lab Results   Component Value Date    HDL 84 02/19/2020     Lab Results   Component Value Date    LDLCALC 64 05/16/2016     Lab Results   Component Value Date    TRIG 42 09/03/2016     No components found for: [place], [place]  Lab Results   Component Value Date    WBC 4.1 05/12/2020    HGB 14.2 05/12/2020    HCT 45.5 05/12/2020    MCV 87.0 05/12/2020    PLT See Reflexed IPF Result 05/12/2020     Lab Results   Component Value Date    INR 1.2 05/12/2020    PROTIME 12.1 (H) 05/12/2020     Lab Results   Component Value Date    GLUCOSE 89 08/03/2019    CREATININE 1.74 (H) 05/12/2020    BUN 28 (H) 05/12/2020     05/12/2020    K 4.3 05/12/2020     (H) 05/12/2020    CO2 23 05/12/2020     Lab Results   Component Value Date    ALT 24 05/12/2020    AST 36 05/12/2020    ALKPHOS 91 05/12/2020    BILITOT 0.65 05/12/2020     Lab Results   Component Value Date    LABPROT 5.3 (L) 11/18/2012    LABALBU 3.5 05/12/2020     Lab Results   Component Value Date    TSH 3.88 02/19/2020     Assessment:  1. Essential hypertension    2. Peripheral vascular disease ([facility] [address]    3. Prostate cancer ([facility] [address]    4. Muscle cramps    5. Leg swelling    6.  Chronic renal failure, stage 2 (mild)        Plan:  Patient's blood pressure is stable on current medications and is on amlodipine 5 mg daily and metoprolol  Patient's last creatinine was 1.74 and did not see the nephrologist and will repeat lab work and as patient does not have significant swelling in the legs will hold off diuretics at this time until labs are done  Patient's leg cramps are improved after taking the magnesium and will repeat levels  Patient's peripheral vascular disease is stable and no recurrence of prostate carcinoma  Patient had flu vaccine last month  Patient's visit to the emergency room and reports reviewed and patient states that he is negative for COVID  Refills on prednisone and colchicine given for gout flareups  Patient will be seeing the neurologist for memory problems next week  Review in 6 months           1. Kai Benoit received counseling on the following healthy behaviors: nutrition and exercise    2. Prior labs and health maintenance reviewed. 3.  Discussed use, benefit, and side effects of prescribed medications. Barriers to medication compliance addressed. All his questions were answered. Pt voiced understanding. Kai Benoit will continue current medications, diet and exercise. Orders Placed This Encounter   Medications    predniSONE (DELTASONE) 10 MG tablet     Sig: Take 0.5 tablets by mouth 2 times daily for 5 days     Dispense:  10 tablet     Refill:  0    colchicine (COLCRYS) 0.6 MG tablet     Sig: Take 1 tablet by mouth daily for 10 days     Dispense:  10 tablet     Refill:  0          Completed Refills               Requested Prescriptions     Signed Prescriptions Disp Refills    predniSONE (DELTASONE) 10 MG tablet 10 tablet 0     Sig: Take 0.5 tablets by mouth 2 times daily for 5 days    colchicine (COLCRYS) 0.6 MG tablet 10 tablet 0     Sig: Take 1 tablet by mouth daily for 10 days     4. Patient given educational materials - see patient instructions    5. Was a self-tracking handout given in paper form or via [employer]? NO    Orders Placed This Encounter   Procedures    Comprehensive Metabolic Panel     Standing Status:   Future     Standing Expiration Date:   10/8/2021    CBC     Standing Status:   Future     Standing Expiration Date:   10/8/2021    Magnesium     Standing Status:   Future     Standing Expiration Date:   10/8/2021    TSH without Reflex     Standing Status:   Future     Standing Expiration Date:   10/8/2021     Return in about 6 months (around 4/8/2021). Patient voiced understanding and agreed to treatment plan.      Electronically signed by Chelly Norwood MD on 10/8/2020 at 9:54 AM    This note is created with a voice recognition program and while intend to generate a document that accurately reflects the content of the visit, no guarantee can be provided that every mistake has been identified and corrected by editing.

## 2020-10-09 ENCOUNTER — TELEPHONE (OUTPATIENT)
Dept: INTERNAL MEDICINE CLINIC | Age: 85
End: 2020-10-09

## 2020-10-09 LAB
ALBUMIN SERPL-MCNC: 3.7 G/DL (ref 3.5–5.2)
ALBUMIN/GLOBULIN RATIO: 1.4 (ref 1–2.5)
ALP BLD-CCNC: 120 U/L (ref 40–129)
ALT SERPL-CCNC: 40 U/L (ref 5–41)
ANION GAP SERPL CALCULATED.3IONS-SCNC: 10 MMOL/L (ref 9–17)
AST SERPL-CCNC: 43 U/L
BILIRUB SERPL-MCNC: 0.71 MG/DL (ref 0.3–1.2)
BUN BLDV-MCNC: 29 MG/DL (ref 8–23)
BUN/CREAT BLD: ABNORMAL (ref 9–20)
CALCIUM SERPL-MCNC: 9.1 MG/DL (ref 8.6–10.4)
CHLORIDE BLD-SCNC: 106 MMOL/L (ref 98–107)
CO2: 20 MMOL/L (ref 20–31)
CREAT SERPL-MCNC: 2.02 MG/DL (ref 0.7–1.2)
GFR AFRICAN AMERICAN: 38 ML/MIN
GFR NON-AFRICAN AMERICAN: 31 ML/MIN
GFR SERPL CREATININE-BSD FRML MDRD: ABNORMAL ML/MIN/{1.73_M2}
GFR SERPL CREATININE-BSD FRML MDRD: ABNORMAL ML/MIN/{1.73_M2}
GLUCOSE BLD-MCNC: 92 MG/DL (ref 70–99)
POTASSIUM SERPL-SCNC: 3.9 MMOL/L (ref 3.7–5.3)
SODIUM BLD-SCNC: 136 MMOL/L (ref 135–144)
TOTAL PROTEIN: 6.3 G/DL (ref 6.4–8.3)

## 2020-10-09 NOTE — TELEPHONE ENCOUNTER
----- Message from Henry Gomez MD sent at 10/9/2020  9:42 AM EDT -----  Repeat BUN/creatinine in 1 week

## 2020-10-22 ENCOUNTER — TELEPHONE (OUTPATIENT)
Dept: INTERNAL MEDICINE CLINIC | Age: 85
End: 2020-10-22

## 2020-10-22 NOTE — TELEPHONE ENCOUNTER
pt had labs done while admitted to [facility]. asking if he still needs to have Bun, creatinine? Results are in care everywhere.

## 2020-11-06 ENCOUNTER — HOSPITAL ENCOUNTER (OUTPATIENT)
Age: 85
Setting detail: SPECIMEN
Discharge: HOME | End: 2020-11-06
Payer: MEDICARE

## 2020-11-06 LAB
ALBUMIN SERPL-MCNC: 3.7 G/DL (ref 3.5–5.2)
ALBUMIN/GLOBULIN RATIO: 1.4 (ref 1–2.5)
ALP BLD-CCNC: 110 U/L (ref 40–129)
ALT SERPL-CCNC: 49 U/L (ref 5–41)
ANION GAP SERPL CALCULATED.3IONS-SCNC: 9 MMOL/L (ref 9–17)
AST SERPL-CCNC: 75 U/L
BILIRUB SERPL-MCNC: 0.6 MG/DL (ref 0.3–1.2)
BNP INTERPRETATION: ABNORMAL
BUN BLDV-MCNC: 30 MG/DL (ref 8–23)
BUN/CREAT BLD: ABNORMAL (ref 9–20)
CALCIUM SERPL-MCNC: 9.1 MG/DL (ref 8.6–10.4)
CHLORIDE BLD-SCNC: 107 MMOL/L (ref 98–107)
CHOLESTEROL/HDL RATIO: 1.7
CHOLESTEROL: 149 MG/DL
CO2: 23 MMOL/L (ref 20–31)
CREAT SERPL-MCNC: 2.36 MG/DL (ref 0.7–1.2)
GFR AFRICAN AMERICAN: 32 ML/MIN
GFR NON-AFRICAN AMERICAN: 26 ML/MIN
GFR SERPL CREATININE-BSD FRML MDRD: ABNORMAL ML/MIN/{1.73_M2}
GFR SERPL CREATININE-BSD FRML MDRD: ABNORMAL ML/MIN/{1.73_M2}
GLUCOSE BLD-MCNC: 102 MG/DL (ref 70–99)
HCT VFR BLD CALC: 44.9 % (ref 40.7–50.3)
HDLC SERPL-MCNC: 88 MG/DL
HEMOGLOBIN: 13.9 G/DL (ref 13–17)
IRON SATURATION: 39 % (ref 20–55)
IRON: 67 UG/DL (ref 59–158)
LDL CHOLESTEROL: 55 MG/DL (ref 0–130)
MCH RBC QN AUTO: 26.2 PG (ref 25.2–33.5)
MCHC RBC AUTO-ENTMCNC: 31 G/DL (ref 28.4–34.8)
MCV RBC AUTO: 84.7 FL (ref 82.6–102.9)
NRBC AUTOMATED: 0 PER 100 WBC
PDW BLD-RTO: 15.2 % (ref 11.8–14.4)
PLATELET # BLD: 152 K/UL (ref 138–453)
PMV BLD AUTO: 12.4 FL (ref 8.1–13.5)
POTASSIUM SERPL-SCNC: 4.3 MMOL/L (ref 3.7–5.3)
PRO-BNP: 882 PG/ML
RBC # BLD: 5.3 M/UL (ref 4.21–5.77)
SODIUM BLD-SCNC: 139 MMOL/L (ref 135–144)
THYROXINE, FREE: 1.24 NG/DL (ref 0.93–1.7)
TOTAL CK: 261 U/L (ref 39–308)
TOTAL IRON BINDING CAPACITY: 174 UG/DL (ref 250–450)
TOTAL PROTEIN: 6.3 G/DL (ref 6.4–8.3)
TRIGL SERPL-MCNC: 32 MG/DL
TSH SERPL DL<=0.05 MIU/L-ACNC: 4.6 MIU/L (ref 0.3–5)
UNSATURATED IRON BINDING CAPACITY: 107 UG/DL (ref 112–347)
VITAMIN B-12: 1104 PG/ML (ref 232–1245)
VLDLC SERPL CALC-MCNC: NORMAL MG/DL (ref 1–30)
WBC # BLD: 3.5 K/UL (ref 3.5–11.3)

## 2020-11-08 LAB
ESTIMATED AVERAGE GLUCOSE: 123 MG/DL
HBA1C MFR BLD: 5.9 % (ref 4–6)

## 2020-11-17 ENCOUNTER — TELEPHONE (OUTPATIENT)
Dept: INTERNAL MEDICINE CLINIC | Age: 85
End: 2020-11-17

## 2020-11-17 NOTE — TELEPHONE ENCOUNTER
Pt informed, he is not seeing nephrology, saw Dr Mandie Phan in [facility]     Set appt 12/14/20 at 230pm- pti nformed, labs and referral faxed to pt

## 2020-11-17 NOTE — TELEPHONE ENCOUNTER
----- Message from Tigre Jean MD sent at 11/17/2020  2:01 PM EST -----  Patient's creatinine is still elevated and advised patient to see nephrologist if not already seeing

## 2020-12-02 ENCOUNTER — HOSPITAL ENCOUNTER (OUTPATIENT)
Age: 85
Setting detail: SPECIMEN
Discharge: HOME | End: 2020-12-02
Payer: MEDICARE

## 2020-12-02 LAB
ANION GAP SERPL CALCULATED.3IONS-SCNC: 10 MMOL/L (ref 9–17)
BUN BLDV-MCNC: 34 MG/DL (ref 8–23)
BUN/CREAT BLD: ABNORMAL (ref 9–20)
CALCIUM SERPL-MCNC: 9.5 MG/DL (ref 8.6–10.4)
CHLORIDE BLD-SCNC: 106 MMOL/L (ref 98–107)
CO2: 24 MMOL/L (ref 20–31)
CREAT SERPL-MCNC: 2.5 MG/DL (ref 0.7–1.2)
GFR AFRICAN AMERICAN: 30 ML/MIN
GFR NON-AFRICAN AMERICAN: 24 ML/MIN
GFR SERPL CREATININE-BSD FRML MDRD: ABNORMAL ML/MIN/{1.73_M2}
GFR SERPL CREATININE-BSD FRML MDRD: ABNORMAL ML/MIN/{1.73_M2}
GLUCOSE BLD-MCNC: 80 MG/DL (ref 70–99)
POTASSIUM SERPL-SCNC: 4.6 MMOL/L (ref 3.7–5.3)
SODIUM BLD-SCNC: 140 MMOL/L (ref 135–144)

## 2021-01-08 ENCOUNTER — TELEPHONE (OUTPATIENT)
Dept: INTERNAL MEDICINE CLINIC | Age: 86
End: 2021-01-08

## 2021-01-08 NOTE — TELEPHONE ENCOUNTER
Carlton asking if you will follow for home care?     Discharging from [facility] DX: heart failure    Please advise

## 2021-01-13 ENCOUNTER — HOSPITAL ENCOUNTER (OUTPATIENT)
Age: 86
Setting detail: SPECIMEN
Discharge: HOME | End: 2021-01-13
Payer: MEDICARE

## 2021-01-13 LAB
ANION GAP SERPL CALCULATED.3IONS-SCNC: 11 MMOL/L (ref 9–17)
BUN BLDV-MCNC: 40 MG/DL (ref 8–23)
BUN/CREAT BLD: ABNORMAL (ref 9–20)
CALCIUM SERPL-MCNC: 9.6 MG/DL (ref 8.6–10.4)
CHLORIDE BLD-SCNC: 107 MMOL/L (ref 98–107)
CO2: 23 MMOL/L (ref 20–31)
CREAT SERPL-MCNC: 2.81 MG/DL (ref 0.7–1.2)
GFR AFRICAN AMERICAN: 26 ML/MIN
GFR NON-AFRICAN AMERICAN: 21 ML/MIN
GFR SERPL CREATININE-BSD FRML MDRD: ABNORMAL ML/MIN/{1.73_M2}
GFR SERPL CREATININE-BSD FRML MDRD: ABNORMAL ML/MIN/{1.73_M2}
GLUCOSE BLD-MCNC: 67 MG/DL (ref 70–99)
POTASSIUM SERPL-SCNC: 4.2 MMOL/L (ref 3.7–5.3)
SODIUM BLD-SCNC: 141 MMOL/L (ref 135–144)

## 2021-01-14 NOTE — TELEPHONE ENCOUNTER
Pt called to report he was given rx for fluticasone furoate inhaler in hospital.  States insurance will not cover. Covered alternatives are: qvar, pulmocort, flovent or asmanex.

## 2021-01-15 ENCOUNTER — VIRTUAL VISIT (OUTPATIENT)
Dept: INTERNAL MEDICINE CLINIC | Age: 86
End: 2021-01-15
Payer: MEDICARE

## 2021-01-15 DIAGNOSIS — M54.40 CHRONIC LOW BACK PAIN WITH SCIATICA, SCIATICA LATERALITY UNSPECIFIED, UNSPECIFIED BACK PAIN LATERALITY: ICD-10-CM

## 2021-01-15 DIAGNOSIS — G89.29 CHRONIC LOW BACK PAIN WITH SCIATICA, SCIATICA LATERALITY UNSPECIFIED, UNSPECIFIED BACK PAIN LATERALITY: ICD-10-CM

## 2021-01-15 DIAGNOSIS — Z95.0 S/P PLACEMENT OF CARDIAC PACEMAKER: ICD-10-CM

## 2021-01-15 DIAGNOSIS — J90 PLEURAL EFFUSION, RIGHT: ICD-10-CM

## 2021-01-15 DIAGNOSIS — I10 ESSENTIAL HYPERTENSION: Primary | ICD-10-CM

## 2021-01-15 DIAGNOSIS — N18.2 CHRONIC RENAL FAILURE, STAGE 2 (MILD): ICD-10-CM

## 2021-01-15 PROCEDURE — 99496 TRANSJ CARE MGMT HIGH F2F 7D: CPT | Performed by: INTERNAL MEDICINE

## 2021-01-15 PROCEDURE — 1111F DSCHRG MED/CURRENT MED MERGE: CPT | Performed by: INTERNAL MEDICINE

## 2021-01-15 RX ORDER — TORSEMIDE 5 MG/1
5 TABLET ORAL DAILY
COMMUNITY
Start: 2021-01-08 | End: 2021-01-18

## 2021-01-15 RX ORDER — BUDESONIDE AND FORMOTEROL FUMARATE DIHYDRATE 160; 4.5 UG/1; UG/1
2 AEROSOL RESPIRATORY (INHALATION) 2 TIMES DAILY
Qty: 1 INHALER | Refills: 2 | Status: SHIPPED | OUTPATIENT
Start: 2021-01-15 | End: 2021-04-20 | Stop reason: ALTCHOICE

## 2021-01-15 ASSESSMENT — PATIENT HEALTH QUESTIONNAIRE - PHQ9
SUM OF ALL RESPONSES TO PHQ QUESTIONS 1-9: 0
2. FEELING DOWN, DEPRESSED OR HOPELESS: 0
SUM OF ALL RESPONSES TO PHQ QUESTIONS 1-9: 0

## 2021-01-15 NOTE — PROGRESS NOTES
Post-Discharge Transitional Care Management Services or Hospital Follow Up      Erin Montenegro   YOB: 1930    Date of Office Visit:  1/15/2021  Date of Hospital Admission: 1/4/2021  Date of Hospital Discharge: 1/7/2021  Risk of hospital readmission (high >=14%.  Medium >=10%) :No data recorded    Care management risk score Rising risk (score 2-5) and Complex Care (Scores >=6): 1     Non face to face  following discharge, date last encounter closed (first attempt may have been earlier): *No documented post hospital discharge outreach found in the last 14 days    Call initiated 2 business days of discharge: *No response recorded in the last 14 days    Patient Active Problem List   Diagnosis    Sleep apnea    Prostate cancer ([facility] [address]    CRF (chronic renal failure)    OA (osteoarthritis) of knee    Hypercholesteremia    Impotence    HTN (hypertension)    Closed fracture of shaft of metacarpal bone    Primary osteoarthritis of right hip    Peripheral vascular disease ([facility] [address]       Allergies   Allergen Reactions    Ace Inhibitors     Other      Beta-blockers slow heart rate    Timolol      Eye drops slow heart rate       Medications listed as ordered at the time of discharge from [facility] Medication Instructions [facility]:    Printed on:01/15/21 3802   Medication Information                      amLODIPine (NORVASC) 5 MG tablet  Take 1 tablet by mouth daily             apixaban (ELIQUIS) 2.5 MG TABS tablet  Take 1 tablet by mouth 2 times daily             aspirin 81 MG tablet  Take 1 tablet by mouth daily             beclomethasone (QVAR REDIHALER) 40 MCG/ACT AERB inhaler  Inhale 1 puff into the lungs 2 times daily             colchicine (COLCRYS) 0.6 MG tablet  Take 1 tablet by mouth daily for 10 days             Compression Stockings MISC  by Does not apply route 20-30 mmHg calf length             famotidine (PEPCID) 20 MG tablet  Take 1 tablet by mouth daily Patient was admitted in October 2020 for symptomatic bradycardia and patient had a history of atrial fibrillation and is on anticoagulation and patient was evaluated by cardiology and a cardiac pacemaker was placed and dual-chamber pacemaker was inserted and patient was discharged and last week patient's breathing was getting worse and increased shortness of breath and patient went to the hospital and found to have right-sided pleural effusion which was drained through thoracentesis and patient was advised by nephrology to have limit fluid intake and also on torsemide and currently patient feeling better but complaining of low back pain if he stands for a long period of time    Inpatient course: Discharge summary reviewed- see chart. Interval history/Current status: Patient doing well after discharge and patient has seen the nephrologist and will be seeing them again next week and patient has seen the cardiologist 2 days back and patient's complaint is about his back bothering him if he stands for long time and advised patient to take Tylenol as needed 1 or 2 times a day as patient has degenerative disc disease and advised him to call me back if symptoms persist  Patient was given inhaler Breo when he was in the hospital there and that was not covered and so patient's inhaler was changed to Symbicort and a new prescription was sent to the pharmacy  Patient to follow in 3 months    A comprehensive review of systems was negative except for what was noted in the HPI. There were no vitals filed for this visit. There is no height or weight on file to calculate BMI.    Wt Readings from Last 3 Encounters:   10/08/20 188 lb 3.2 oz (85.4 kg)   07/07/20 174 lb (78.9 kg)   01/20/20 179 lb 9.6 oz (81.5 kg)     BP Readings from Last 3 Encounters:   10/08/20 126/64   07/07/20 116/60   01/20/20 136/64        Physical Exam:  Unable to do physical examination because it is a virtual phone visit    Assessment/Plan: Diagnosis Orders   1. Essential hypertension  UT DISCHARGE MEDS RECONCILED W/ CURRENT OUTPATIENT MED LIST   2. Pleural effusion, right  UT DISCHARGE MEDS RECONCILED W/ CURRENT OUTPATIENT MED LIST   3. Chronic renal failure, stage 2 (mild)  UT DISCHARGE MEDS RECONCILED W/ CURRENT OUTPATIENT MED LIST   4. S/P placement of cardiac pacemaker  UT DISCHARGE MEDS RECONCILED W/ CURRENT OUTPATIENT MED LIST   5.  Chronic low back pain with sciatica, sciatica laterality unspecified, unspecified back pain laterality  UT DISCHARGE MEDS RECONCILED W/ CURRENT OUTPATIENT MED LIST           Medical Decision Making: high complexity

## 2021-01-20 NOTE — TELEPHONE ENCOUNTER
Received fax Symbicort needs PA- covered alternatives are Advair discus or Advair HFA    Please advise, thanks

## 2021-01-21 ENCOUNTER — TELEPHONE (OUTPATIENT)
Dept: INTERNAL MEDICINE CLINIC | Age: 86
End: 2021-01-21

## 2021-01-27 ENCOUNTER — HOSPITAL ENCOUNTER (OUTPATIENT)
Age: 86
Setting detail: SPECIMEN
Discharge: HOME | End: 2021-01-27
Payer: MEDICARE

## 2021-01-27 LAB
-: NORMAL
ALBUMIN SERPL-MCNC: 3.5 G/DL (ref 3.5–5.2)
ALBUMIN/GLOBULIN RATIO: 1.2 (ref 1–2.5)
ALP BLD-CCNC: 67 U/L (ref 40–129)
ALT SERPL-CCNC: 29 U/L (ref 5–41)
AMORPHOUS: NORMAL
ANION GAP SERPL CALCULATED.3IONS-SCNC: 7 MMOL/L (ref 9–17)
AST SERPL-CCNC: 40 U/L
BACTERIA: NORMAL
BILIRUB SERPL-MCNC: 0.46 MG/DL (ref 0.3–1.2)
BILIRUBIN URINE: NEGATIVE
BUN BLDV-MCNC: 34 MG/DL (ref 8–23)
BUN/CREAT BLD: ABNORMAL (ref 9–20)
CALCIUM SERPL-MCNC: 9.7 MG/DL (ref 8.6–10.4)
CASTS UA: NORMAL /LPF (ref 0–8)
CHLORIDE BLD-SCNC: 110 MMOL/L (ref 98–107)
CO2: 26 MMOL/L (ref 20–31)
COLOR: YELLOW
COMMENT UA: ABNORMAL
CREAT SERPL-MCNC: 3.09 MG/DL (ref 0.7–1.2)
CREATININE URINE: 134.2 MG/DL (ref 39–259)
CRYSTALS, UA: NORMAL /HPF
EPITHELIAL CELLS UA: NORMAL /HPF (ref 0–5)
GFR AFRICAN AMERICAN: 23 ML/MIN
GFR NON-AFRICAN AMERICAN: 19 ML/MIN
GFR SERPL CREATININE-BSD FRML MDRD: ABNORMAL ML/MIN/{1.73_M2}
GFR SERPL CREATININE-BSD FRML MDRD: ABNORMAL ML/MIN/{1.73_M2}
GLUCOSE BLD-MCNC: 91 MG/DL (ref 70–99)
GLUCOSE URINE: NEGATIVE
HCT VFR BLD CALC: 43 % (ref 40.7–50.3)
HEMOGLOBIN: 13.6 G/DL (ref 13–17)
KETONES, URINE: NEGATIVE
LEUKOCYTE ESTERASE, URINE: NEGATIVE
MUCUS: NORMAL
NITRITE, URINE: NEGATIVE
OTHER OBSERVATIONS UA: NORMAL
PH UA: 6 (ref 5–8)
PHOSPHORUS: 3.1 MG/DL (ref 2.5–4.5)
POTASSIUM SERPL-SCNC: 4.2 MMOL/L (ref 3.7–5.3)
PROTEIN UA: ABNORMAL
PTH INTACT: 64.62 PG/ML (ref 15–65)
RBC UA: NORMAL /HPF (ref 0–4)
RENAL EPITHELIAL, UA: NORMAL /HPF
SODIUM BLD-SCNC: 143 MMOL/L (ref 135–144)
SPECIFIC GRAVITY UA: 1.01 (ref 1–1.03)
TOTAL PROTEIN, URINE: 23 MG/DL
TOTAL PROTEIN: 6.4 G/DL (ref 6.4–8.3)
TRICHOMONAS: NORMAL
TURBIDITY: CLEAR
URINE HGB: ABNORMAL
URINE TOTAL PROTEIN CREATININE RATIO: 0.17 (ref 0–0.2)
UROBILINOGEN, URINE: NORMAL
VITAMIN D 25-HYDROXY: 42.1 NG/ML (ref 30–100)
WBC UA: NORMAL /HPF (ref 0–5)
YEAST: NORMAL

## 2021-02-10 ENCOUNTER — HOSPITAL ENCOUNTER (OUTPATIENT)
Age: 86
Setting detail: SPECIMEN
Discharge: HOME | End: 2021-02-10
Payer: MEDICARE

## 2021-02-10 LAB
ALBUMIN SERPL-MCNC: 3.8 G/DL (ref 3.5–5.2)
ALBUMIN/GLOBULIN RATIO: 1.3 (ref 1–2.5)
ALP BLD-CCNC: 74 U/L (ref 40–129)
ALT SERPL-CCNC: 28 U/L (ref 5–41)
ANION GAP SERPL CALCULATED.3IONS-SCNC: 12 MMOL/L (ref 9–17)
AST SERPL-CCNC: 38 U/L
BILIRUB SERPL-MCNC: 0.69 MG/DL (ref 0.3–1.2)
BNP INTERPRETATION: ABNORMAL
BUN BLDV-MCNC: 33 MG/DL (ref 8–23)
BUN/CREAT BLD: ABNORMAL (ref 9–20)
CALCIUM SERPL-MCNC: 9.7 MG/DL (ref 8.6–10.4)
CHLORIDE BLD-SCNC: 106 MMOL/L (ref 98–107)
CHOLESTEROL, FASTING: 145 MG/DL
CHOLESTEROL/HDL RATIO: 2
CO2: 23 MMOL/L (ref 20–31)
CREAT SERPL-MCNC: 2.88 MG/DL (ref 0.7–1.2)
ESTIMATED AVERAGE GLUCOSE: 120 MG/DL
GFR AFRICAN AMERICAN: 25 ML/MIN
GFR NON-AFRICAN AMERICAN: 21 ML/MIN
GFR SERPL CREATININE-BSD FRML MDRD: ABNORMAL ML/MIN/{1.73_M2}
GFR SERPL CREATININE-BSD FRML MDRD: ABNORMAL ML/MIN/{1.73_M2}
GLUCOSE FASTING: 88 MG/DL (ref 70–99)
HBA1C MFR BLD: 5.8 % (ref 4–6)
HCT VFR BLD CALC: 45.7 % (ref 40.7–50.3)
HDLC SERPL-MCNC: 71 MG/DL
HEMOGLOBIN: 14.3 G/DL (ref 13–17)
IRON SATURATION: 31 % (ref 20–55)
IRON: 66 UG/DL (ref 59–158)
LDL CHOLESTEROL: 61 MG/DL (ref 0–130)
MCH RBC QN AUTO: 26.7 PG (ref 25.2–33.5)
MCHC RBC AUTO-ENTMCNC: 31.3 G/DL (ref 28.4–34.8)
MCV RBC AUTO: 85.3 FL (ref 82.6–102.9)
NRBC AUTOMATED: 0 PER 100 WBC
PDW BLD-RTO: 16.5 % (ref 11.8–14.4)
PLATELET # BLD: 120 K/UL (ref 138–453)
PMV BLD AUTO: 12.3 FL (ref 8.1–13.5)
POTASSIUM SERPL-SCNC: 3.9 MMOL/L (ref 3.7–5.3)
PRO-BNP: ABNORMAL PG/ML
RBC # BLD: 5.36 M/UL (ref 4.21–5.77)
SODIUM BLD-SCNC: 141 MMOL/L (ref 135–144)
THYROXINE, FREE: 1.41 NG/DL (ref 0.93–1.7)
TOTAL CK: 228 U/L (ref 39–308)
TOTAL IRON BINDING CAPACITY: 216 UG/DL (ref 250–450)
TOTAL PROTEIN: 6.8 G/DL (ref 6.4–8.3)
TRIGLYCERIDE, FASTING: 63 MG/DL
TSH SERPL DL<=0.05 MIU/L-ACNC: 7.13 MIU/L (ref 0.3–5)
UNSATURATED IRON BINDING CAPACITY: 150 UG/DL (ref 112–347)
VITAMIN B-12: 1296 PG/ML (ref 232–1245)
VLDLC SERPL CALC-MCNC: NORMAL MG/DL (ref 1–30)
WBC # BLD: 4.6 K/UL (ref 3.5–11.3)

## 2021-02-20 ENCOUNTER — HOSPITAL ENCOUNTER (OUTPATIENT)
Dept: LAB | Age: 86
Setting detail: SPECIMEN
Discharge: HOME | End: 2021-02-20
Payer: MEDICARE

## 2021-02-20 DIAGNOSIS — Z01.818 PREOP TESTING: Primary | ICD-10-CM

## 2021-02-20 PROCEDURE — U0003 INFECTIOUS AGENT DETECTION BY NUCLEIC ACID (DNA OR RNA); SEVERE ACUTE RESPIRATORY SYNDROME CORONAVIRUS 2 (SARS-COV-2) (CORONAVIRUS DISEASE [COVID-19]), AMPLIFIED PROBE TECHNIQUE, MAKING USE OF HIGH THROUGHPUT TECHNOLOGIES AS DESCRIBED BY CMS-2020-01-R: HCPCS

## 2021-02-20 PROCEDURE — U0005 INFEC AGEN DETEC AMPLI PROBE: HCPCS

## 2021-02-21 LAB
SARS-COV-2: NORMAL
SARS-COV-2: NOT DETECTED
SOURCE: NORMAL

## 2021-02-22 ENCOUNTER — TELEPHONE (OUTPATIENT)
Dept: PRIMARY CARE CLINIC | Age: 86
End: 2021-02-22

## 2021-02-23 ENCOUNTER — HOSPITAL ENCOUNTER (OUTPATIENT)
Age: 86
Setting detail: SPECIMEN
Discharge: HOME | End: 2021-02-23
Payer: MEDICARE

## 2021-02-23 LAB
ANION GAP SERPL CALCULATED.3IONS-SCNC: 11 MMOL/L (ref 9–17)
BUN BLDV-MCNC: 37 MG/DL (ref 8–23)
BUN/CREAT BLD: ABNORMAL (ref 9–20)
CALCIUM SERPL-MCNC: 9.3 MG/DL (ref 8.6–10.4)
CHLORIDE BLD-SCNC: 104 MMOL/L (ref 98–107)
CO2: 26 MMOL/L (ref 20–31)
CREAT SERPL-MCNC: 3.16 MG/DL (ref 0.7–1.2)
GFR AFRICAN AMERICAN: 23 ML/MIN
GFR NON-AFRICAN AMERICAN: 19 ML/MIN
GFR SERPL CREATININE-BSD FRML MDRD: ABNORMAL ML/MIN/{1.73_M2}
GFR SERPL CREATININE-BSD FRML MDRD: ABNORMAL ML/MIN/{1.73_M2}
GLUCOSE BLD-MCNC: 93 MG/DL (ref 70–99)
POTASSIUM SERPL-SCNC: 3.8 MMOL/L (ref 3.7–5.3)
SODIUM BLD-SCNC: 141 MMOL/L (ref 135–144)

## 2021-02-24 ENCOUNTER — HOSPITAL ENCOUNTER (OUTPATIENT)
Dept: PULMONOLOGY | Age: 86
Discharge: HOME | End: 2021-02-24
Payer: MEDICARE

## 2021-02-24 DIAGNOSIS — R06.09 DOE (DYSPNEA ON EXERTION): ICD-10-CM

## 2021-02-24 PROCEDURE — 94010 BREATHING CAPACITY TEST: CPT

## 2021-02-24 PROCEDURE — 94726 PLETHYSMOGRAPHY LUNG VOLUMES: CPT

## 2021-02-24 PROCEDURE — 94729 DIFFUSING CAPACITY: CPT

## 2021-03-01 ENCOUNTER — TELEPHONE (OUTPATIENT)
Dept: INTERNAL MEDICINE CLINIC | Age: 86
End: 2021-03-01

## 2021-03-01 RX ORDER — POLYETHYLENE GLYCOL 3350 17 G/17G
17 POWDER, FOR SOLUTION ORAL DAILY
Qty: 510 G | Refills: 0 | Status: SHIPPED | OUTPATIENT
Start: 2021-03-01 | End: 2021-03-04 | Stop reason: SINTOL

## 2021-03-04 ENCOUNTER — TELEPHONE (OUTPATIENT)
Dept: INTERNAL MEDICINE CLINIC | Age: 86
End: 2021-03-04

## 2021-03-04 RX ORDER — DOCUSATE SODIUM 100 MG/1
100 CAPSULE, LIQUID FILLED ORAL DAILY PRN
Qty: 30 CAPSULE | Refills: 1 | Status: SHIPPED | OUTPATIENT
Start: 2021-03-04 | End: 2022-03-14

## 2021-03-04 NOTE — TELEPHONE ENCOUNTER
Pt was informed, is agreeable. Will call cardiology, if unable to reach them will go to ED. rx changed to colace.

## 2021-03-04 NOTE — TELEPHONE ENCOUNTER
I do not think GlycoLax is causing the symptoms and patient needs to call his cardiologist or go to the emergency room  Advise patient to hold the GlycoLax and start on Colace 100 mg daily

## 2021-03-12 ENCOUNTER — TELEPHONE (OUTPATIENT)
Dept: INTERNAL MEDICINE CLINIC | Age: 86
End: 2021-03-12

## 2021-03-12 ENCOUNTER — HOSPITAL ENCOUNTER (OUTPATIENT)
Age: 86
Setting detail: SPECIMEN
Discharge: HOME | End: 2021-03-12
Payer: MEDICARE

## 2021-03-12 ENCOUNTER — OFFICE VISIT (OUTPATIENT)
Dept: INTERNAL MEDICINE CLINIC | Age: 86
End: 2021-03-12
Payer: MEDICARE

## 2021-03-12 VITALS
HEART RATE: 75 BPM | BODY MASS INDEX: 25.03 KG/M2 | WEIGHT: 169 LBS | RESPIRATION RATE: 20 BRPM | OXYGEN SATURATION: 96 % | SYSTOLIC BLOOD PRESSURE: 116 MMHG | DIASTOLIC BLOOD PRESSURE: 60 MMHG | HEIGHT: 69 IN | TEMPERATURE: 96.4 F

## 2021-03-12 DIAGNOSIS — N18.30 CHRONIC RENAL FAILURE, STAGE 3 (MODERATE), UNSPECIFIED WHETHER STAGE 3A OR 3B CKD (HCC): ICD-10-CM

## 2021-03-12 DIAGNOSIS — I50.23 ACUTE ON CHRONIC SYSTOLIC CONGESTIVE HEART FAILURE (HCC): Primary | ICD-10-CM

## 2021-03-12 DIAGNOSIS — I10 ESSENTIAL HYPERTENSION: ICD-10-CM

## 2021-03-12 DIAGNOSIS — R06.02 SHORTNESS OF BREATH: ICD-10-CM

## 2021-03-12 LAB
ANION GAP SERPL CALCULATED.3IONS-SCNC: 12 MMOL/L (ref 9–17)
BUN BLDV-MCNC: 43 MG/DL (ref 8–23)
BUN/CREAT BLD: ABNORMAL (ref 9–20)
CALCIUM SERPL-MCNC: 9.7 MG/DL (ref 8.6–10.4)
CHLORIDE BLD-SCNC: 105 MMOL/L (ref 98–107)
CO2: 24 MMOL/L (ref 20–31)
CREAT SERPL-MCNC: 3.28 MG/DL (ref 0.7–1.2)
GFR AFRICAN AMERICAN: 22 ML/MIN
GFR NON-AFRICAN AMERICAN: 18 ML/MIN
GFR SERPL CREATININE-BSD FRML MDRD: ABNORMAL ML/MIN/{1.73_M2}
GFR SERPL CREATININE-BSD FRML MDRD: ABNORMAL ML/MIN/{1.73_M2}
GLUCOSE BLD-MCNC: 94 MG/DL (ref 70–99)
POTASSIUM SERPL-SCNC: 4.3 MMOL/L (ref 3.7–5.3)
SODIUM BLD-SCNC: 141 MMOL/L (ref 135–144)

## 2021-03-12 PROCEDURE — 1111F DSCHRG MED/CURRENT MED MERGE: CPT | Performed by: INTERNAL MEDICINE

## 2021-03-12 PROCEDURE — 99215 OFFICE O/P EST HI 40 MIN: CPT | Performed by: INTERNAL MEDICINE

## 2021-03-12 RX ORDER — TORSEMIDE 20 MG/1
20 TABLET ORAL DAILY
COMMUNITY
End: 2021-04-01

## 2021-03-12 RX ORDER — AMIODARONE HYDROCHLORIDE 200 MG/1
200 TABLET ORAL DAILY
COMMUNITY
End: 2021-04-01

## 2021-03-12 RX ORDER — ALBUTEROL SULFATE 90 UG/1
AEROSOL, METERED RESPIRATORY (INHALATION)
COMMUNITY
Start: 2021-02-03 | End: 2021-04-20 | Stop reason: ALTCHOICE

## 2021-03-12 RX ORDER — BISOPROLOL FUMARATE 5 MG/1
5 TABLET ORAL DAILY
COMMUNITY
End: 2021-04-20 | Stop reason: ALTCHOICE

## 2021-03-12 NOTE — PROGRESS NOTES
apixaban (ELIQUIS) 2.5 mg tablet   Take 2.5 mg by mouth 2 (two) times a day.    0 01/11/2016     aspirin 81 mg   Take 81 mg by mouth daily. Monday, wed, fri    0       bisoprolol (ZEBETA) 5 mg tablet   Take 5 mg by mouth daily.   0       famotidine (PEPCID) 20 mg tablet   Take 20 mg by mouth nightly.    0 01/11/2016     niacin (NIASPAN) 1000 mg CR tablet   Take 100 mg by mouth nightly.    0 01/11/2016     ROSUVASTATIN CALCIUM (CRESTOR ORAL)   Take 40 mg by mouth nightly.    0       amiodarone (PACERONE) 100 mg tablet   Take 1 tablet (100 mg total) by mouth daily for 30 days. 30 tablet   0 03/07/2021 04/06/2021   amLODIPine (NORVASC) 5 mg tablet   Take 10 mg by mouth daily.   0       atorvastatin (LIPITOR) 20 mg tablet   Take 1 tablet (20 mg total) by mouth nightly. 30 tablet   0 03/06/2021     doxazosin (CARDURA) 8 mg tablet   Take 2 mg by mouth nightly.    0       fluticasone furoate-vilanteroL (BREO ELLIPTA) 100-25 mcg/dose blister with device   Inhale 1 puff daily. 1 Inhaler   0 01/09/2021     furosemide (LASIX) 40 mg tablet   Take 1 tablet (40 mg total) by mouth daily for 30 days. 30 tabl           Post-Discharge Transitional Care Management Services or Hospital Follow Up      Tawana Pina   YOB: 1930    Date of Office Visit:  3/12/2021  Date of Hospital Admission: 3/4/2021  Date of Hospital Discharge: 3/6/2021  Risk of hospital readmission (high >=14%.  Medium >=10%) :No data recorded    Care management risk score Rising risk (score 2-5) and Complex Care (Scores >=6): 1     Non face to face  following discharge, date last encounter closed (first attempt may have been earlier): *No documented post hospital discharge outreach found in the last 14 days    Call initiated 2 business days of discharge: *No response recorded in the last 14 days    Patient Active Problem List   Diagnosis    Sleep apnea    Prostate cancer ([facility] [address]    CRF (chronic renal failure)    OA (osteoarthritis) of knee    Hypercholesteremia    Impotence    HTN (hypertension)    Closed fracture of shaft of metacarpal bone    Primary osteoarthritis of right hip    Peripheral vascular disease (HCC)       Allergies   Allergen Reactions    Ace Inhibitors     Other      Beta-blockers slow heart rate    Timolol      Eye drops slow heart rate       Medications listed as ordered at the time of discharge from hospital   Aurora GILLILAND   Home Medication Instructions ROCK:    Printed on:03/12/21 3477   Medication Information                      albuterol sulfate  (90 Base) MCG/ACT inhaler  inhale 1 puff by mouth and INTO THE LUNGS every 4 hours             amiodarone (CORDARONE) 200 MG tablet  Take 200 mg by mouth daily             amLODIPine (NORVASC) 5 MG tablet  Take 1 tablet by mouth daily             apixaban (ELIQUIS) 2.5 MG TABS tablet  Take 1 tablet by mouth 2 times daily             aspirin 81 MG tablet  Take 1 tablet by mouth daily             beclomethasone (QVAR REDIHALER) 40 MCG/ACT AERB inhaler  Inhale 1 puff into the lungs 2 times daily             bisoprolol (ZEBETA) 5 MG tablet  Take 5 mg by mouth daily             budesonide-formoterol (SYMBICORT) 160-4.5 MCG/ACT AERO  Inhale 2 puffs into the lungs 2 times daily             colchicine (COLCRYS) 0.6 MG tablet  Take 1 tablet by mouth daily for 10 days             Compression Stockings MISC  by Does not apply route 20-30 mmHg calf length             docusate sodium (COLACE) 100 MG capsule  Take 1 capsule by mouth daily as needed for Constipation             famotidine (PEPCID) 20 MG tablet  Take 1 tablet by mouth daily             fluticasone-salmeterol (ADVAIR HFA) 115-21 MCG/ACT inhaler  Inhale 2 puffs into the lungs 2 times daily             niacin (NIASPAN) 1000 MG extended release tablet  Take 1 tablet by mouth 2 times daily             rosuvastatin (CRESTOR) 40 MG tablet  Take 1 tablet by mouth every evening             torsemide (DEMADEX) 20 MG tablet  Take 20 mg by mouth daily                   Medications marked \"taking\" at this time  Outpatient Medications Marked as Taking for the 3/12/21 encounter (Office Visit) with Jessie Renee MD   Medication Sig Dispense Refill    albuterol sulfate  (90 Base) MCG/ACT inhaler inhale 1 puff by mouth and INTO THE LUNGS every 4 hours      amiodarone (CORDARONE) 200 MG tablet Take 200 mg by mouth daily      bisoprolol (ZEBETA) 5 MG tablet Take 5 mg by mouth daily      torsemide (DEMADEX) 20 MG tablet Take 20 mg by mouth daily      docusate sodium (COLACE) 100 MG capsule Take 1 capsule by mouth daily as needed for Constipation 30 capsule 1    apixaban (ELIQUIS) 2.5 MG TABS tablet Take 1 tablet by mouth 2 times daily 7 tablet 0    famotidine (PEPCID) 20 MG tablet Take 1 tablet by mouth daily 7 tablet 0    niacin (NIASPAN) 1000 MG extended release tablet Take 1 tablet by mouth 2 times daily (Patient taking differently: Take 1,000 mg by mouth daily ) 180 tablet 3    rosuvastatin (CRESTOR) 40 MG tablet Take 1 tablet by mouth every evening 90 tablet 3    aspirin 81 MG tablet Take 1 tablet by mouth daily 30 tablet 2        Medications patient taking as of now reconciled against medications ordered at time of hospital discharge: Yes    Chief Complaint   Patient presents with    [place] [facility]     still having some sob; fatigued quickly    Health Maintenance     psa, flu, shingles, tdap, covid       History of Present illness - Follow up of Hospital diagnosis(es): Patient was seen in the emergency room on our advice because of increased shortness of breath and subsequently admitted for acute on chronic congestive heart failure and also acute renal failure on chronic renal failure and patient was seen by cardiology and nephrology and patient improved but still complains of sudden feeling of taking deep breaths for better breathing sensation but denies any shortness of breath on exertion and patient is using the albuterol inhaler which was given by the pulmonologist as needed but he says that made his symptoms worse so he stopped using it but I told him to at least try once or twice and see if that makes a difference and also will get a copy of the office notes from the nephrologist that he has seen after discharge from the hospital and he says that they made some adjustments on the medication but patient was not sure and he will be seeing the cardiologist on 25th of this month    Inpatient course: Discharge summary reviewed- see chart. Interval history/Current status: Patient currently doing better but still having occasional sensation of shortness of breath and clinically patient sounds better and no edema and currently euvolemic and patient taking her 20 mg of Lasix at this time and will review reports from nephrology  Follow-up in the office in 6 months    A comprehensive review of systems was negative except for what was noted in the HPI. Vitals:    03/12/21 1103   BP: 116/60   Site: Right Upper Arm   Position: Sitting   Cuff Size: Medium Adult   Pulse: 75   Resp: 20   Temp: 96.4 °F (35.8 °C)   TempSrc: Temporal   SpO2: 96%   Weight: 169 lb (76.7 kg)   Height: 5' 9.02\" (1.753 m)     Body mass index is 24.95 kg/m².    Wt Readings from Last 3 Encounters:   03/12/21 169 lb (76.7 kg)   10/08/20 188 lb 3.2 oz (85.4 kg)   07/07/20 174 lb (78.9 kg)     BP Readings from Last 3 Encounters:   03/12/21 116/60   10/08/20 126/64   07/07/20 116/60        Physical Exam:  General Appearance: alert and oriented to person, place and time, well developed and well- nourished, in no acute distress  Skin: warm and dry, no rash or erythema  Head: normocephalic and atraumatic  Eyes: pupils equal, round, and reactive to light, extraocular eye movements intact, conjunctivae normal  ENT: tympanic membrane, external ear and ear canal normal bilaterally, nose without deformity, nasal mucosa and turbinates normal without polyps  Neck: supple and non-tender without mass, no thyromegaly or thyroid nodules, no cervical lymphadenopathy  Pulmonary/Chest: clear to auscultation bilaterally- no wheezes, rales or rhonchi, normal air movement, no respiratory distress  Cardiovascular: normal rate, regular rhythm, normal S1 and S2, no murmurs, rubs, clicks, or gallops, distal pulses intact, no carotid bruits  Abdomen: soft, non-tender, non-distended, normal bowel sounds, no masses or organomegaly  Extremities: no cyanosis, clubbing or edema  Musculoskeletal: normal range of motion, no joint swelling, deformity or tenderness  Neurologic: reflexes normal and symmetric, no cranial nerve deficit, gait, coordination and speech normal    Assessment/Plan:   Diagnosis Orders   1. Acute on chronic systolic congestive heart failure ([address]  [place] DISCHARGE MEDS RECONCILED W/ CURRENT OUTPATIENT MED LIST   2. Chronic renal failure, stage 3 (moderate), unspecified whether stage 3a or 3b CKD  ND DISCHARGE MEDS RECONCILED W/ CURRENT OUTPATIENT MED LIST   3. Shortness of breath  ND DISCHARGE MEDS RECONCILED W/ CURRENT OUTPATIENT MED LIST   4.  Essential hypertension  ND DISCHARGE MEDS RECONCILED W/ CURRENT OUTPATIENT MED LIST           Medical Decision Making: high complexity

## 2021-03-12 NOTE — TELEPHONE ENCOUNTER
Patient calling to inform Dr that his energy level is low, he gets tired, and at times he gets SOB, patient states he is not using his inhaler    Spoke with Dr Molly Hi   Per Dr Dewey Roth have patient resume inhaler Albuterol 2-3 times a day   monitor symptoms, if it gets worst go to the ER,   call back on Monday to let us know how he is doing

## 2021-03-16 NOTE — TELEPHONE ENCOUNTER
Patient called back today with the same problem as yesterday.     He states that he will go to the ER

## 2021-03-16 NOTE — TELEPHONE ENCOUNTER
Patient called me last night and advised him to go to the emergency room patient refused and I did advise patient to call his cardiologist but okay if he goes to the emergency room for evaluation

## 2021-03-19 ENCOUNTER — TELEPHONE (OUTPATIENT)
Dept: INTERNAL MEDICINE CLINIC | Age: 86
End: 2021-03-19

## 2021-03-19 NOTE — TELEPHONE ENCOUNTER
Pt seen at [facility] and scheduled f/u visit for 3/26 at providers soonest. Would like a call back if sooner appt becomes available.

## 2021-03-23 ENCOUNTER — TELEPHONE (OUTPATIENT)
Dept: INTERNAL MEDICINE CLINIC | Age: 86
End: 2021-03-23

## 2021-04-01 ENCOUNTER — HOSPITAL ENCOUNTER (OUTPATIENT)
Age: 86
Setting detail: SPECIMEN
Discharge: HOME | End: 2021-04-01
Payer: MEDICARE

## 2021-04-01 ENCOUNTER — OFFICE VISIT (OUTPATIENT)
Dept: INTERNAL MEDICINE CLINIC | Age: 86
End: 2021-04-01
Payer: MEDICARE

## 2021-04-01 ENCOUNTER — TELEPHONE (OUTPATIENT)
Dept: INTERNAL MEDICINE CLINIC | Age: 86
End: 2021-04-01

## 2021-04-01 VITALS
BODY MASS INDEX: 22.78 KG/M2 | DIASTOLIC BLOOD PRESSURE: 58 MMHG | HEART RATE: 79 BPM | WEIGHT: 153.8 LBS | TEMPERATURE: 96.6 F | OXYGEN SATURATION: 100 % | SYSTOLIC BLOOD PRESSURE: 100 MMHG | HEIGHT: 69 IN | RESPIRATION RATE: 20 BRPM

## 2021-04-01 DIAGNOSIS — N18.30 CHRONIC RENAL FAILURE, STAGE 3 (MODERATE), UNSPECIFIED WHETHER STAGE 3A OR 3B CKD (HCC): ICD-10-CM

## 2021-04-01 DIAGNOSIS — I50.23 ACUTE ON CHRONIC SYSTOLIC CONGESTIVE HEART FAILURE (HCC): ICD-10-CM

## 2021-04-01 DIAGNOSIS — R63.4 WEIGHT LOSS: Primary | ICD-10-CM

## 2021-04-01 DIAGNOSIS — I50.23 ACUTE ON CHRONIC SYSTOLIC (CONGESTIVE) HEART FAILURE (HCC): ICD-10-CM

## 2021-04-01 DIAGNOSIS — I10 ESSENTIAL HYPERTENSION: ICD-10-CM

## 2021-04-01 LAB
ANION GAP SERPL CALCULATED.3IONS-SCNC: 11 MMOL/L (ref 9–17)
BUN BLDV-MCNC: 45 MG/DL (ref 8–23)
BUN/CREAT BLD: ABNORMAL (ref 9–20)
CALCIUM SERPL-MCNC: 10 MG/DL (ref 8.6–10.4)
CHLORIDE BLD-SCNC: 98 MMOL/L (ref 98–107)
CO2: 30 MMOL/L (ref 20–31)
CREAT SERPL-MCNC: 4.13 MG/DL (ref 0.7–1.2)
GFR AFRICAN AMERICAN: 17 ML/MIN
GFR NON-AFRICAN AMERICAN: 14 ML/MIN
GFR SERPL CREATININE-BSD FRML MDRD: ABNORMAL ML/MIN/{1.73_M2}
GFR SERPL CREATININE-BSD FRML MDRD: ABNORMAL ML/MIN/{1.73_M2}
GLUCOSE BLD-MCNC: 105 MG/DL (ref 70–99)
POTASSIUM SERPL-SCNC: 3.1 MMOL/L (ref 3.7–5.3)
SODIUM BLD-SCNC: 139 MMOL/L (ref 135–144)

## 2021-04-01 PROCEDURE — 1111F DSCHRG MED/CURRENT MED MERGE: CPT | Performed by: INTERNAL MEDICINE

## 2021-04-01 PROCEDURE — 99214 OFFICE O/P EST MOD 30 MIN: CPT | Performed by: INTERNAL MEDICINE

## 2021-04-01 RX ORDER — BUMETANIDE 2 MG/1
TABLET ORAL
COMMUNITY
Start: 2021-03-18

## 2021-04-01 ASSESSMENT — PATIENT HEALTH QUESTIONNAIRE - PHQ9
2. FEELING DOWN, DEPRESSED OR HOPELESS: 0
SUM OF ALL RESPONSES TO PHQ QUESTIONS 1-9: 0
1. LITTLE INTEREST OR PLEASURE IN DOING THINGS: 0
SUM OF ALL RESPONSES TO PHQ QUESTIONS 1-9: 0

## 2021-04-01 NOTE — TELEPHONE ENCOUNTER
C/o runny nose, dry mouth, states going on for a couple months. States nothing was mentioned to him at [facility] what to do.   Please advise

## 2021-04-01 NOTE — PROGRESS NOTES
Post-Discharge Transitional Care Management Services or Hospital Follow Up      Delores Tripp   YOB: 1930    Date of Office Visit:  4/1/2021  Date of Hospital Admission: 3/16/2021  Date of Hospital Discharge: 3/18/2021  Risk of hospital readmission (high >=14%.  Medium >=10%) :No data recorded    Care management risk score Rising risk (score 2-5) and Complex Care (Scores >=6): 1     Non face to face  following discharge, date last encounter closed (first attempt may have been earlier): *No documented post hospital discharge outreach found in the last 14 days    Call initiated 2 business days of discharge: *No response recorded in the last 14 days    Patient Active Problem List   Diagnosis    Sleep apnea    Prostate cancer ([facility] [address]    CRF (chronic renal failure)    OA (osteoarthritis) of knee    Hypercholesteremia    Impotence    HTN (hypertension)    Closed fracture of shaft of metacarpal bone    Primary osteoarthritis of right hip    Peripheral vascular disease ([facility] [address]       Allergies   Allergen Reactions    Ace Inhibitors     Other      Beta-blockers slow heart rate    Timolol      Eye drops slow heart rate       Medications listed as ordered at the time of discharge from [facility], [address] [place] [address] Medication Instructions ROCK:    Printed on:04/01/21 1110   Medication Information                      albuterol sulfate  (90 Base) MCG/ACT inhaler  inhale 1 puff by mouth and INTO THE LUNGS every 4 hours             apixaban (ELIQUIS) 2.5 MG TABS tablet  Take 1 tablet by mouth 2 times daily             aspirin 81 MG tablet  Take 1 tablet by mouth daily             bisoprolol (ZEBETA) 5 MG tablet  Take 5 mg by mouth daily             budesonide-formoterol (SYMBICORT) 160-4.5 MCG/ACT AERO  Inhale 2 puffs into the lungs 2 times daily             bumetanide (BUMEX) 2 MG tablet  take 1 tablet by mouth twice a day             colchicine (COLCRYS) 0.6 MG tablet  Take 1 tablet by mouth daily for 10 days             Compression Stockings MISC  by Does not apply route 20-30 mmHg calf length             docusate sodium (COLACE) 100 MG capsule  Take 1 capsule by mouth daily as needed for Constipation             famotidine (PEPCID) 20 MG tablet  Take 1 tablet by mouth daily             fluticasone-salmeterol (ADVAIR HFA) 115-21 MCG/ACT inhaler  Inhale 2 puffs into the lungs 2 times daily             niacin (NIASPAN) 1000 MG extended release tablet  Take 1 tablet by mouth 2 times daily             rosuvastatin (CRESTOR) 40 MG tablet  Take 1 tablet by mouth every evening                   Medications marked \"taking\" at this time  Outpatient Medications Marked as Taking for the 4/1/21 encounter (Office Visit) with Turner Vega MD   Medication Sig Dispense Refill    bumetanide (BUMEX) 2 MG tablet take 1 tablet by mouth twice a day      albuterol sulfate  (90 Base) MCG/ACT inhaler inhale 1 puff by mouth and INTO THE LUNGS every 4 hours      bisoprolol (ZEBETA) 5 MG tablet Take 5 mg by mouth daily      docusate sodium (COLACE) 100 MG capsule Take 1 capsule by mouth daily as needed for Constipation 30 capsule 1    apixaban (ELIQUIS) 2.5 MG TABS tablet Take 1 tablet by mouth 2 times daily 7 tablet 0    famotidine (PEPCID) 20 MG tablet Take 1 tablet by mouth daily 7 tablet 0    rosuvastatin (CRESTOR) 40 MG tablet Take 1 tablet by mouth every evening 90 tablet 3    aspirin 81 MG tablet Take 1 tablet by mouth daily 30 tablet 2        Medications patient taking as of now reconciled against medications ordered at time of hospital discharge: Yes    Chief Complaint   Patient presents with    Follow-Up from Hospital     not having any sob at this time; discuss weight loss and no appetite    Health Maintenance     covid, tdap, shingles, psa    Constipation     going on for a few months    Nasal Congestion     having a runny nose for a couple of months; also has dry mouth; feels like cold is stuck in throat and unable to cough it up       History of Present illness - Follow up of Hospital diagnosis(es): Due to shortness of breath patient had presented to the emergency room and was admitted second time in 2 weeks for the same complaint and treated for acute on chronic renal failure and acute on chronic congestive heart failure and patient was discharged home currently patient does not have any issues with shortness of breath but he is having problems with loss of appetite loss of weight and also constipation and has seen the cardiologist last week and will be seeing the nephrologist next week and his creatinine is at 3.38    Inpatient course: Discharge summary reviewed- see chart. Interval history/Current status: Patient currently stable as far as he is shortness of breath and heart failure issues are concerned and cardiology note reviewed and medications reviewed and patient to continue Eliquis aspirin Bumex and bisoprolol and patient was switched back to rosuvastatin and patient is on Pepcid for his acid reflux once a day and not using his inhalers other than albuterol as needed because the insurance did not pay for it and so I did advise patient increase the Pepcid to twice a day because he is feeling that something is stuck in his throat and also referred to gastroenterology for evaluation of weight loss and patient has lost 16 pounds since the last visit 2 weeks back and which could be related to fluid removal because patient is on Bumex 2 mg twice a day and his loss of appetite could be related to his chronic renal failure and congestive heart failure which could be causing some decreased appetite  Patient is advised to take the Colace every day and also restart on the MiraLAX and patient is agreeable and will call me back after seeing the nephrologist next week    A comprehensive review of systems was negative except for what was noted in the HPI.     Vitals:    04/01/21 1033 04/01/21 1041   BP: congestive heart failure [facility])  Po Briscoe MD, Gastroenterology, [address]    [place] DISCHARGE MEDS RECONCILED W/ CURRENT OUTPATIENT MED LIST   3. Chronic renal failure, stage 3 (moderate), unspecified whether stage 3a or 3b CKD  Po Briscoe MD, Gastroenterology, [address]    [place] DISCHARGE MEDS RECONCILED W/ CURRENT OUTPATIENT MED LIST   4. Essential hypertension  Po Briscoe MD, Gastroenterology, [address]    [place] DISCHARGE MEDS RECONCILED W/ CURRENT OUTPATIENT MED LIST   5.  Acute on chronic systolic (congestive) heart failure [facility])  Po Briscoe MD, Gastroenterology, [address]    [place] DISCHARGE MEDS RECONCILED W/ CURRENT OUTPATIENT MED LIST           Medical Decision Making: high complexity

## 2021-04-09 ENCOUNTER — TELEPHONE (OUTPATIENT)
Dept: INTERNAL MEDICINE CLINIC | Age: 86
End: 2021-04-09

## 2021-04-12 NOTE — TELEPHONE ENCOUNTER
Spoke to pt, he has been having BM normally. His only concern is that he doesn't have much of an appetite.   Please advise

## 2021-04-12 NOTE — TELEPHONE ENCOUNTER
If he is feeling Evangelina Shiver and having bowel movements no need to come to office and follow as scheduled

## 2021-04-16 ENCOUNTER — CARE COORDINATION (OUTPATIENT)
Dept: CARE COORDINATION | Age: 86
End: 2021-04-16

## 2021-04-16 NOTE — CARE COORDINATION
Ambulatory Care Coordination Note  4/20/2021  CM Risk Score: 2  Charlson 10 Year Mortality Risk Score: 100%     ACC: Austin Spurling, EPI    Summary Note:   Patient Active Problem List    Diagnosis Date Noted    Peripheral vascular disease ([facility] [address] 10/08/2020    Primary osteoarthritis of right hip 10/20/2015    Closed fracture of shaft of metacarpal bone 05/14/2014    Sleep apnea     Prostate cancer ([facility] [address]     CRF (chronic renal failure)     OA (osteoarthritis) of knee     Hypercholesteremia     Impotence     HTN (hypertension)        Patient referred to [facility] by Elena. [facility] initial outreach call to patient, introduced self and informed of reason for call, patient is receptive to [employer] calls.  will send [facility] letter. Patient is , lives alone and has 6 children. He is independent, does not use any assistive devices for ambulating. States he has had 1 fall, he missed the bottom step and fell. His nephew [facility] comes to see him almost daily. Reconciled home meds with patient and provided education. States he manages his own home meds and states compliance. Reviewed and updated demos, EC and ACP. Discussed with patient the safety and precautions for COVID virus. Encouraged to get the COVID vaccines. States he will discuss with his nephew. CHF: patient currently denies having any lower leg or feet swelling. He has cardio - DrTone Keller f/u 4/29/2021 and pacer check on 5/20/2021 at 3:15. Is taking Bumex 2 mg twice daily. States he has been losing weight due not having much of an appetite. He is drinking Ensure and eating two meals a day and still losing weight. He feels something stuck in throat. He has GI appt on 5/7/2021 with Dr. Quinn Dominguez. He states he is on a low salt diet and that doesn't help with appetite either. Instructed patient to eat small frequent meals instead of large ones. His kidney doctor mentioned to him about possible dialysis d/t his kidney function.      LOV with PCP 4/1/2021. CM Plan:  - will update Dr. Janae Post, send [facility] letter and CHF zone tool.   - follow up with patient next week. - Remind patient of his cardio and ED appts. Ambulatory Care Coordination Assessment    Care Coordination Protocol  Program Enrollment: Complex Care  Referral from Primary Care Provider: No  Week 1 - Initial Assessment     Do you have all of your prescriptions and are they filled?: Yes  Barriers to medication adherence: None  Are you able to afford your medications?: Yes  How often do you have trouble taking your medications the way you have been told to take them?: I always take them as prescribed. Do you have Home O2 Therapy?: No      Ability to seek help/take action for Emergent Urgent situations i.e. fire, crime, inclement weather or health crisis. : Independent  Ability to ambulate to restroom: Independent  Ability handle personal hygeine needs (bathing/dressing/grooming): Independent  Ability to manage Medications: Independent  Ability to prepare Food Preparation: Independent  Ability to maintain home (clean home, laundry): Independent  Ability to drive and/or has transportation: Independent  Ability to do shopping: Independent  Ability to manage finances: Independent  Is patient able to live independently?: Yes     Current Housing: Private Residence        Per the Fall Risk Screening, did the patient have 2 or more falls or 1 fall with injury in the past year?: Yes  How often do you think you are about to fall and you do NOT fall?  For example, you grab something to stabilize yourself or hold onto a wall/furniture?: Rarely  Use of a Mobility Aid: No  Difficulty walking/impaired gait: No  Issues with feet or shoes like numbness, edema, shoes not fitting: No  Changes in vision, poor vision or poor lighting in environment: No  Dizziness: No  Other Fall Risk: No     Do you use rails/bars?: Yes  Do you have a non-slip tub mat?: Yes     Are you experiencing loss of meaning?: No  Are you experiencing loss of hope and peace?: No     Thinking about your patient's physical health needs, are there any symptoms or problems (risk indicators) you are unsure about that require further investigation?: No identified areas of uncertainly or problems already being investigated   Are the patients physical health problems impacting on their mental well-being?: No identified areas of concern   Are there any problems with your patients lifestyle behaviors (alcohol, drugs, diet, exercise) that are impacting on physical or mental well-being?: No identified areas of concern   Do you have any other concerns about your patients mental well-being? How would you rate their severity and impact on the patient?: No identified areas of concern   How would you rate their home environment in terms of safety and stability (including domestic violence, insecure housing, neighbor harassment)?: Consistently safe, supportive, stable, no identified problems   How do daily activities impact on the patient's well-being? (include current or anticipated unemployment, work, caregiving, access to transportation or other): No identified problems or perceived positive benefits   How would you rate their social network (family, work, friends)?: Good participation with social networks   How would you rate their financial resources (including ability to afford all required medical care)?: Financially secure, resources adequate, no identified problems   How wells does the patient now understand their health and well-being (symptoms, signs or risk factors) and what they need to do to manage their health?: Reasonable to good understanding and already engages in managing health or is willing to undertake better management   How well do you think your patient can engage in healthcare discussions?  (Barriers include language, deafness, aphasia, alcohol or drug problems, learning difficulties, concentration): Clear and open communication, no identified barriers   Do other services need to be involved to help this patient?: Other care/services not required at this time   Are current services involved with this patient well-coordinated? (Include coordination with other services you are now recommendation): All required care/services in place and well-coordinated   Suggested Interventions and Community Resources  Fall Risk Prevention: In Process Senior Services: Not Started   Zone Management Tools: In Process         Set up/Review Goals, Set up/Review an Education Plan, Schedule an appointment with the patient's PCP          Congestive Heart Failure Assessment    Are you currently restricting fluids?: 1800cc  Do you understand a low sodium diet?: Yes  Do you understand how to read food labels?: Yes  Do you salt your food before tasting it?: No         Symptoms:  CHF associated dyspnea on exertion: Pos, CHF associated leg swelling: Neg      Symptom course: stable  Weight trend: stable  Salt intake watch compared to last visit: stable           Prior to Admission medications    Medication Sig Start Date End Date Taking? Authorizing Provider   fluticasone-vilanterol (BREO ELLIPTA) 100-25 MCG/INH AEPB inhaler Inhale 2 puffs into the lungs daily   Yes Historical Provider, MD   polyethylene glycol (MIRALAX) 17 g PACK packet Take 17 g by mouth daily 4/8/21  Yes Historical Provider, MD   Iron-Vitamins (GERITOL TONIC PO) Take by mouth daily Take 1 tablespoon by mouth daily.    Yes Historical Provider, MD   bumetanide (BUMEX) 2 MG tablet take 1 tablet by mouth twice a day 3/18/21  Yes Historical Provider, MD   docusate sodium (COLACE) 100 MG capsule Take 1 capsule by mouth daily as needed for Constipation 3/4/21  Yes Reanna Luevano MD   apixaban (ELIQUIS) 2.5 MG TABS tablet Take 1 tablet by mouth 2 times daily 1/3/19  Yes Reanna Luevano MD   famotidine (PEPCID) 20 MG tablet Take 1 tablet by mouth daily 1/3/19  Yes Reanna Luevano MD rosuvastatin (CRESTOR) 40 MG tablet Take 1 tablet by mouth every evening 12/20/18  Yes Jonna Cesar MD   aspirin 81 MG tablet Take 1 tablet by mouth daily 12/20/18  Yes Jonna Cesar MD   Compression Stockings MISC by Does not apply route 20-30 mmHg calf length  Patient not taking: Reported on 10/8/2020 6/5/19   Jonna Cesar MD         Patient has following risk factors of: heart failure, chronic kidney disease and hx blood clots. CTN/ACM reviewed discharge instructions, medical action plan and red flags related to discharge diagnosis. Reviewed and educated them on any new and changed medications related to discharge diagnosis. Advised obtaining a 90-day supply of all daily and as-needed medications. Education provided regarding infection prevention, and signs and symptoms of COVID-19 and when to seek medical attention with patient who verbalized understanding. Discussed exposure protocols and quarantine from [address] you at higher risk for severe illness 2019 and given an opportunity for questions and concerns. The patient agrees to contact the COVID-19 hotline [phone] or PCP office for questions related to their healthcare. CTN/ACM provided contact information for future reference. From CDC: Are you at higher risk for severe illness?  Wash your hands often.  Avoid close contact (6 feet, which is about two arm lengths) with people who are sick.  Put distance between yourself and other people if COVID-19 is spreading in your community.  Clean and disinfect frequently touched surfaces.  Avoid all cruise travel and non-essential air travel.  Call your healthcare professional if you have concerns about COVID-19 and your underlying condition or if you are sick.     For more information on steps you can take to protect yourself, see CDC's How to [address] with COVID-19 may have no symptoms, mild symptoms, such as fever, cough, and shortness of breath or they may have more severe illness, developing severe and fatal pneumonia. As a result, Advance Care Planning with attention to naming a health care decision maker (someone you trust to make healthcare decisions for you if you could not speak for yourself) and sharing other health care preferences is important BEFORE a possible health crisis. Please contact your Primary Care Provider to discuss Advance Care Planning. Preventing the Spread of Coronavirus Disease 2019 in Homes and Residential Communities  For the most recent information go to [place] [email or web address]    Prevention steps for People with confirmed or suspected COVID-19 (including persons under investigation) who do not need to be hospitalized  and   People with confirmed COVID-19 who were hospitalized and determined to be medically stable to go home    Your healthcare provider and public health staff will evaluate whether you can be cared for at home. If it is determined that you do not need to be hospitalized and can be isolated at home, you will be monitored by staff from your local or state health department. You should follow the prevention steps below until a healthcare provider or local or state health department says you can return to your normal activities. Stay home except to get medical care  People who are mildly ill with COVID-19 are able to isolate at home during their illness. You should restrict activities outside your home, except for getting medical care. Do not go to work, school, or public areas. Avoid using public transportation, ride-sharing, or taxis. Separate yourself from other people and animals in your home  People: As much as possible, you should stay in a specific room and away from other people in your home. Also, you should use a separate bathroom, if available. Animals:  You should restrict contact with pets and other animals while you are sick with COVID-19, just like you would around other people. Although there have not been reports of pets or other animals becoming sick with COVID-19, it is still recommended that people sick with COVID-19 limit contact with animals until more information is known about the virus. When possible, have another member of your household care for your animals while you are sick. If you are sick with COVID-19, avoid contact with your pet, including petting, snuggling, being kissed or licked, and sharing food. If you must care for your pet or be around animals while you are sick, wash your hands before and after you interact with pets and wear a facemask. Call ahead before visiting your doctor  If you have a medical appointment, call the healthcare provider and tell them that you have or may have COVID-19. This will help the healthcare providers office take steps to keep other people from getting infected or exposed. Wear a facemask  You should wear a facemask when you are around other people (e.g., sharing a room or vehicle) or pets and before you enter a healthcare providers office. If you are not able to wear a facemask (for example, because it causes trouble breathing), then people who live with you should not stay in the same room with you, or they should wear a facemask if they enter your room. Cover your coughs and sneezes  Cover your mouth and nose with a tissue when you cough or sneeze. Throw used tissues in a lined trash can. Immediately wash your hands with soap and water for at least 20 seconds or, if soap and water are not available, clean your hands with an alcohol-based hand  that contains at least 60% alcohol. Clean your hands often  Wash your hands often with soap and water for at least 20 seconds, especially after blowing your nose, coughing, or sneezing; going to the bathroom; and before eating or preparing food.  If soap and water are not readily available, use an alcohol-based hand  with at least 60% alcohol, covering all surfaces of your hands and rubbing them together until they feel dry. Soap and water are the best option if hands are visibly dirty. Avoid touching your eyes, nose, and mouth with unwashed hands. Avoid sharing personal household items  You should not share dishes, drinking glasses, cups, eating utensils, towels, or bedding with other people or pets in your home. After using these items, they should be washed thoroughly with soap and water. Clean all high-touch surfaces everyday  High touch surfaces include counters, tabletops, doorknobs, bathroom fixtures, toilets, phones, keyboards, tablets, and bedside tables. Also, clean any surfaces that may have blood, stool, or body fluids on them. Use a household cleaning spray or wipe, according to the label instructions. Labels contain instructions for safe and effective use of the cleaning product including precautions you should take when applying the product, such as wearing gloves and making sure you have good ventilation during use of the product. Monitor your symptoms  Seek prompt medical attention if your illness is worsening (e.g., difficulty breathing). Before seeking care, call your healthcare provider and tell them that you have, or are being evaluated for, COVID-19. Put on a facemask before you enter the facility. These steps will help the healthcare providers office to keep other people in the office or waiting room from getting infected or exposed. Ask your healthcare provider to call the local or state health department. Persons who are placed under active monitoring or facilitated self-monitoring should follow instructions provided by their local health department or occupational health professionals, as appropriate. When working with your local health department check their available hours.   If you have a medical emergency and need to call 911, notify the dispatch personnel that you have, or are being evaluated for COVID-19. If possible, put on a facemask before emergency medical services arrive. Discontinuing home isolation  Patients with confirmed COVID-19 should remain under home isolation precautions until the risk of secondary transmission to others is thought to be low. The decision to discontinue home isolation precautions should be made on a case-by-case basis, in consultation with healthcare providers and state and local health departments.         Future Appointments   Date Time Provider Oscar Deras   5/7/2021  2:15 PM MD benjy Montiel [facility] Yosef Holm   5/13/2021 11:15 AM MD Мария Bhakta   9/13/2021 10:45 AM MD Мария Bhakta  Yosef Holm       Patient Care Team:  Alfred Gutierrez MD as PCP - [facility]  Alfred Gutierrez MD as PCP - [facility] Empaneled Provider  Erlinda Solitario MD as Consulting Physician (Internal Medicine)  Clint Newman, EPI as Ambulatory Care Manager  Flaquita Bejarano MD as Consulting Physician (Gastroenterology)

## 2021-04-20 ENCOUNTER — CARE COORDINATION (OUTPATIENT)
Dept: CARE COORDINATION | Age: 86
End: 2021-04-20

## 2021-04-20 RX ORDER — FLUTICASONE FUROATE AND VILANTEROL TRIFENATATE 100; 25 UG/1; UG/1
2 POWDER RESPIRATORY (INHALATION) DAILY
COMMUNITY
End: 2021-09-13 | Stop reason: ALTCHOICE

## 2021-04-20 RX ORDER — POLYETHYLENE GLYCOL 3350 17 G/17G
17 POWDER, FOR SOLUTION ORAL DAILY
COMMUNITY
Start: 2021-04-08 | End: 2021-10-20 | Stop reason: SDUPTHER

## 2021-04-20 SDOH — SOCIAL STABILITY: SOCIAL NETWORK: HOW OFTEN DO YOU ATTEND CHURCH OR RELIGIOUS SERVICES?: MORE THAN 4 TIMES PER YEAR

## 2021-04-20 SDOH — HEALTH STABILITY: PHYSICAL HEALTH: ON AVERAGE, HOW MANY DAYS PER WEEK DO YOU ENGAGE IN MODERATE TO STRENUOUS EXERCISE (LIKE A BRISK WALK)?: 0 DAYS

## 2021-04-20 SDOH — SOCIAL STABILITY: SOCIAL NETWORK: ARE YOU MARRIED, WIDOWED, DIVORCED, SEPARATED, NEVER MARRIED, OR LIVING WITH A PARTNER?: DIVORCED

## 2021-04-20 NOTE — ACP (ADVANCE CARE PLANNING)
Advance Care Planning   Healthcare Decision Maker:    Primary Decision Maker: Monalisa Alfredo - Child - [phone]    Click here to complete Healthcare Decision Makers including selection of the Healthcare Decision Maker Relationship (ie \"Primary\"). Today we documented Decision Maker(s) consistent with Legal Next of Kin hierarchy.

## 2021-04-27 ENCOUNTER — CARE COORDINATION (OUTPATIENT)
Dept: CARE COORDINATION | Age: 86
End: 2021-04-27

## 2021-04-27 NOTE — CARE COORDINATION
Ambulatory Care Coordination Note  4/27/2021  CM Risk Score: 2  Charlson 10 Year Mortality Risk Score: 100%     ACC: Prjaden Stewart RN    Summary Note:  Attempted to contact patient for Care Management update, no answer, lvm to return call to this nurse at [phone]. If no return call, ACM will attempt to contact patient again next week. Care Coordination Interventions    Program Enrollment: Complex Care  Referral from Primary Care Provider: No  Suggested Interventions and Community Resources  Fall Risk Prevention: In Process  Marketplace Navigator: Completed (Comment: [employer] referral for [employer], 14 day meals delivered.)  Senior Services: Not Started  Zone Management Tools: In Process (Comment: CHF zone tool)         Goals Addressed                 This Visit's Progress     Conditions and Symptoms   On track     I will schedule office visits, as directed by my provider. I will keep my appointment or reschedule if I have to cancel. I will notify my provider of any barriers to my plan of care. I will follow my Zone Management tool to seek urgent or emergent care. I will notify my provider of any symptoms that indicate a worsening of my condition. Barriers: fear of failure, overwhelmed by complexity of regimen, and lack of education  Plan for overcoming my barriers: will work with Care Manager  Confidence: 8/10  Anticipated Goal Completion Date: 8/1/2021              Prior to Admission medications    Medication Sig Start Date End Date Taking? Authorizing Provider   fluticasone-vilanterol (BREO ELLIPTA) 100-25 MCG/INH AEPB inhaler Inhale 2 puffs into the lungs daily    Historical Provider, MD   polyethylene glycol (MIRALAX) 17 g PACK packet Take 17 g by mouth daily 4/8/21   Historical Provider, MD   Iron-Vitamins (GERITOL TONIC PO) Take by mouth daily Take 1 tablespoon by mouth daily.     Historical Provider, MD   bumetanide (BUMEX) 2 MG tablet take 1 tablet by mouth twice a day 3/18/21 Historical Provider, MD   docusate sodium (COLACE) 100 MG capsule Take 1 capsule by mouth daily as needed for Constipation 3/4/21   Damian Rowe MD   Compression Stockings MISC by Does not apply route 20-30 mmHg calf length  Patient not taking: Reported on 10/8/2020 6/5/19   Damian Rowe MD   apixaban (ELIQUIS) 2.5 MG TABS tablet Take 1 tablet by mouth 2 times daily 1/3/19   Damian Rowe, MD   famotidine (PEPCID) 20 MG tablet Take 1 tablet by mouth daily 1/3/19   Damian Rowe, MD   rosuvastatin (CRESTOR) 40 MG tablet Take 1 tablet by mouth every evening 12/20/18   Damian Rowe, MD   aspirin 81 MG tablet Take 1 tablet by mouth daily 12/20/18   Damian Rowe MD       Future Appointments   Date Time Provider Oscar Deras   5/7/2021  2:15 PM Kenan Bartlett MD [facility]   5/13/2021 11:15 AM MD Мария Augustin   9/13/2021 10:45 AM MD Мария Augustin

## 2021-04-27 NOTE — CARE COORDINATION
Ambulatory Care Coordination Note  4/27/2021  CM Risk Score: 2  Charlson 10 Year Mortality Risk Score: 100%     ACC: Denise Wilde RN    Summary Note: Return call from Erv, states he is doing okay he just has no energy and gets tired easily. States he eats 3 meals per day. He drinks Ensure for his lunch. Encouraged to eat or snack 6 times a day rather that 3 big meals. Informed him that he is to get some meals delivered to him by his insurance. CHF: denies having a weight gain or swelling of lower legs or feet. He is on fluid restriction of 8 cups per day. Instructed to avoid adding salt to his food and he states he doesn't add any extra salt. Reviewed s/s of CHF and instructed on when to call the office or 911. CM Plan:  - will follow up with patient next week. Care Coordination Interventions    Program Enrollment: Complex Care  Referral from Primary Care Provider: No  Suggested Interventions and Community Resources  Fall Risk Prevention: In Process  Marketplace Navigator: Completed (Comment: [employer] referral for [employer], 14 day meals delivered.)  Senior Services: Not Started  Zone Management Tools: In Process (Comment: CHF zone tool)         Congestive Heart Failure Assessment    Are you currently restricting fluids?: 1800cc  Do you understand a low sodium diet?: Yes  Do you understand how to read food labels?: Yes  Do you salt your food before tasting it?: No     No patient-reported symptoms      Symptoms:  None: Yes      Symptom course: stable  Weight trend: stable  Salt intake watch compared to last visit: stable         Goals Addressed                 This Visit's Progress     Conditions and Symptoms   On track     I will schedule office visits, as directed by my provider. I will keep my appointment or reschedule if I have to cancel. I will notify my provider of any barriers to my plan of care. I will follow my Zone Management tool to seek urgent or emergent care.   I will notify my provider of any symptoms that indicate a worsening of my condition. Barriers: fear of failure, overwhelmed by complexity of regimen, and lack of education  Plan for overcoming my barriers: will work with Care Manager  Confidence: 8/10  Anticipated Goal Completion Date: 8/1/2021              Prior to Admission medications    Medication Sig Start Date End Date Taking? Authorizing Provider   fluticasone-vilanterol (BREO ELLIPTA) 100-25 MCG/INH AEPB inhaler Inhale 2 puffs into the lungs daily    Historical Provider, MD   polyethylene glycol (MIRALAX) 17 g PACK packet Take 17 g by mouth daily 4/8/21   Historical Provider, MD   Iron-Vitamins (GERITOL TONIC PO) Take by mouth daily Take 1 tablespoon by mouth daily.     Historical Provider, MD   bumetanide (BUMEX) 2 MG tablet take 1 tablet by mouth twice a day 3/18/21   Historical Provider, MD   docusate sodium (COLACE) 100 MG capsule Take 1 capsule by mouth daily as needed for Constipation 3/4/21   Anu Cristina MD   Compression Stockings MISC by Does not apply route 20-30 mmHg calf length  Patient not taking: Reported on 10/8/2020 6/5/19   Anu Cristina MD   apixaban (ELIQUIS) 2.5 MG TABS tablet Take 1 tablet by mouth 2 times daily 1/3/19   Anu Cristina MD   famotidine (PEPCID) 20 MG tablet Take 1 tablet by mouth daily 1/3/19   Anu Cristina MD   rosuvastatin (CRESTOR) 40 MG tablet Take 1 tablet by mouth every evening 12/20/18   Anu Cristina MD   aspirin 81 MG tablet Take 1 tablet by mouth daily 12/20/18   Anu Cristina MD       Future Appointments   Date Time Provider Oscar Deras   5/7/2021  2:15 PM Suman Garner MD [facility]   5/13/2021 11:15 AM Anu Cristina MD [facility]   9/13/2021 10:45 AM Anu Cristina MD [facility]

## 2021-04-28 ENCOUNTER — HOSPITAL ENCOUNTER (OUTPATIENT)
Age: 86
Setting detail: SPECIMEN
Discharge: HOME | End: 2021-04-28
Payer: MEDICARE

## 2021-04-28 LAB
-: NORMAL
ALBUMIN SERPL-MCNC: 3.7 G/DL (ref 3.5–5.2)
ALBUMIN/GLOBULIN RATIO: 1.3 (ref 1–2.5)
ALP BLD-CCNC: 74 U/L (ref 40–129)
ALT SERPL-CCNC: 16 U/L (ref 5–41)
AMORPHOUS: NORMAL
ANION GAP SERPL CALCULATED.3IONS-SCNC: 6 MMOL/L (ref 9–17)
AST SERPL-CCNC: 28 U/L
BACTERIA: NORMAL
BILIRUB SERPL-MCNC: 0.65 MG/DL (ref 0.3–1.2)
BILIRUBIN URINE: NEGATIVE
BNP INTERPRETATION: ABNORMAL
BUN BLDV-MCNC: 50 MG/DL (ref 8–23)
BUN/CREAT BLD: ABNORMAL (ref 9–20)
CALCIUM SERPL-MCNC: 10 MG/DL (ref 8.6–10.4)
CASTS UA: NORMAL /LPF (ref 0–8)
CHLORIDE BLD-SCNC: 97 MMOL/L (ref 98–107)
CHOLESTEROL, FASTING: 133 MG/DL
CHOLESTEROL/HDL RATIO: 2.3
CO2: 31 MMOL/L (ref 20–31)
COLOR: YELLOW
COMMENT UA: ABNORMAL
CREAT SERPL-MCNC: 3.46 MG/DL (ref 0.7–1.2)
CREATININE URINE: 67.8 MG/DL (ref 39–259)
CRYSTALS, UA: NORMAL /HPF
EPITHELIAL CELLS UA: NORMAL /HPF (ref 0–5)
GFR AFRICAN AMERICAN: 20 ML/MIN
GFR NON-AFRICAN AMERICAN: 17 ML/MIN
GFR SERPL CREATININE-BSD FRML MDRD: ABNORMAL ML/MIN/{1.73_M2}
GFR SERPL CREATININE-BSD FRML MDRD: ABNORMAL ML/MIN/{1.73_M2}
GLUCOSE FASTING: 90 MG/DL (ref 70–99)
GLUCOSE URINE: NEGATIVE
HCT VFR BLD CALC: 47.4 % (ref 40.7–50.3)
HDLC SERPL-MCNC: 58 MG/DL
HEMOGLOBIN: 14.7 G/DL (ref 13–17)
KETONES, URINE: NEGATIVE
LDL CHOLESTEROL: 63 MG/DL (ref 0–130)
LEUKOCYTE ESTERASE, URINE: NEGATIVE
MCH RBC QN AUTO: 26.6 PG (ref 25.2–33.5)
MCHC RBC AUTO-ENTMCNC: 31 G/DL (ref 28.4–34.8)
MCV RBC AUTO: 85.7 FL (ref 82.6–102.9)
MUCUS: NORMAL
NITRITE, URINE: NEGATIVE
NRBC AUTOMATED: 0 PER 100 WBC
OTHER OBSERVATIONS UA: NORMAL
PDW BLD-RTO: 15.3 % (ref 11.8–14.4)
PH UA: 6.5 (ref 5–8)
PLATELET # BLD: 162 K/UL (ref 138–453)
PMV BLD AUTO: 11.3 FL (ref 8.1–13.5)
POTASSIUM SERPL-SCNC: 3.4 MMOL/L (ref 3.7–5.3)
PRO-BNP: 6164 PG/ML
PROTEIN UA: ABNORMAL
PTH INTACT: 122.6 PG/ML (ref 15–65)
RBC # BLD: 5.53 M/UL (ref 4.21–5.77)
RBC UA: NORMAL /HPF (ref 0–4)
RENAL EPITHELIAL, UA: NORMAL /HPF
SODIUM BLD-SCNC: 134 MMOL/L (ref 135–144)
SPECIFIC GRAVITY UA: 1.01 (ref 1–1.03)
THYROXINE, FREE: 1.44 NG/DL (ref 0.93–1.7)
TOTAL CK: 198 U/L (ref 39–308)
TOTAL PROTEIN, URINE: 15 MG/DL
TOTAL PROTEIN: 6.6 G/DL (ref 6.4–8.3)
TRICHOMONAS: NORMAL
TRIGLYCERIDE, FASTING: 61 MG/DL
TSH SERPL DL<=0.05 MIU/L-ACNC: 7.22 MIU/L (ref 0.3–5)
TURBIDITY: CLEAR
URINE HGB: ABNORMAL
URINE TOTAL PROTEIN CREATININE RATIO: 0.22 (ref 0–0.2)
UROBILINOGEN, URINE: NORMAL
VLDLC SERPL CALC-MCNC: NORMAL MG/DL (ref 1–30)
WBC # BLD: 4.9 K/UL (ref 3.5–11.3)
WBC UA: NORMAL /HPF (ref 0–5)
YEAST: NORMAL

## 2021-04-29 LAB
ALBUMIN SERPL-MCNC: 3.7 G/DL (ref 3.5–5.2)
ALBUMIN/GLOBULIN RATIO: 1.3 (ref 1–2.5)
ALP BLD-CCNC: 74 U/L (ref 40–129)
ALT SERPL-CCNC: 16 U/L (ref 5–41)
ANION GAP SERPL CALCULATED.3IONS-SCNC: 6 MMOL/L (ref 9–17)
AST SERPL-CCNC: 28 U/L
BILIRUB SERPL-MCNC: 0.65 MG/DL (ref 0.3–1.2)
BUN BLDV-MCNC: 50 MG/DL (ref 8–23)
BUN/CREAT BLD: ABNORMAL (ref 9–20)
CALCIUM SERPL-MCNC: 10 MG/DL (ref 8.6–10.4)
CHLORIDE BLD-SCNC: 97 MMOL/L (ref 98–107)
CO2: 31 MMOL/L (ref 20–31)
CREAT SERPL-MCNC: 3.46 MG/DL (ref 0.7–1.2)
GFR AFRICAN AMERICAN: 20 ML/MIN
GFR NON-AFRICAN AMERICAN: 17 ML/MIN
GFR SERPL CREATININE-BSD FRML MDRD: ABNORMAL ML/MIN/{1.73_M2}
GFR SERPL CREATININE-BSD FRML MDRD: ABNORMAL ML/MIN/{1.73_M2}
GLUCOSE FASTING: 90 MG/DL (ref 70–99)
HCT VFR BLD CALC: 47.4 % (ref 40.7–50.3)
HEMOGLOBIN: 14.7 G/DL (ref 13–17)
PHOSPHORUS: 3.3 MG/DL (ref 2.5–4.5)
POTASSIUM SERPL-SCNC: 3.4 MMOL/L (ref 3.7–5.3)
SODIUM BLD-SCNC: 134 MMOL/L (ref 135–144)
TOTAL PROTEIN: 6.6 G/DL (ref 6.4–8.3)
VITAMIN D 25-HYDROXY: 52.3 NG/ML (ref 30–100)

## 2021-05-04 ENCOUNTER — CARE COORDINATION (OUTPATIENT)
Dept: CARE COORDINATION | Age: 86
End: 2021-05-04

## 2021-05-04 NOTE — CARE COORDINATION
Ambulatory Care Coordination Note  5/4/2021  CM Risk Score: 2  Charlson 10 Year Mortality Risk Score: 100%     ACC: Paul Banerjee RN    Summary Note: Call from patient who states he has appointment on Friday with [facility] for constipation and he doesn't have constipation anymore. Informed him that Dr. Yang Bonilla wants him to see GI Dr. Selene Carmona  For his weight loss and decreased appetite. He states \"oh yes, that's why\". He clarified the time and states he will go to appt. 5/7/2021. He denies having any other needs or concerns at this time. Noted that patient weight last was 153 lbs down from 188 lbs in Jan 2020, which is 35 lbs. CM will follow up with patient next week to follow up on GI appt on 5/7/2021. Care Coordination Interventions    Program Enrollment: Complex Care  Referral from Primary Care Provider: No  Suggested Interventions and Community Resources  Fall Risk Prevention: In Process  Marketplace Navigator: Completed (Comment: [employer] referral for [employer], 14 day meals delivered.)  Senior Services: Not Started  Zone Management Tools: In Process (Comment: CHF zone tool)         Goals Addressed                 This Visit's Progress     Conditions and Symptoms   On track     I will schedule office visits, as directed by my provider. I will keep my appointment or reschedule if I have to cancel. I will notify my provider of any barriers to my plan of care. I will follow my Zone Management tool to seek urgent or emergent care. I will notify my provider of any symptoms that indicate a worsening of my condition. Barriers: fear of failure, overwhelmed by complexity of regimen, and lack of education  Plan for overcoming my barriers: will work with Care Manager  Confidence: 8/10  Anticipated Goal Completion Date: 8/1/2021              Prior to Admission medications    Medication Sig Start Date End Date Taking?  Authorizing Provider   fluticasone-vilanterol (BREO ELLIPTA) 100-25 MCG/INH AEPB inhaler Inhale 2 puffs into the lungs daily    Historical Provider, MD   polyethylene glycol (MIRALAX) 17 g PACK packet Take 17 g by mouth daily 4/8/21   Historical Provider, MD   Iron-Vitamins (GERITOL TONIC PO) Take by mouth daily Take 1 tablespoon by mouth daily.     Historical Provider, MD   bumetanide (BUMEX) 2 MG tablet take 1 tablet by mouth twice a day 3/18/21   Historical Provider, MD   docusate sodium (COLACE) 100 MG capsule Take 1 capsule by mouth daily as needed for Constipation 3/4/21   Jamie Mauro MD   Compression Stockings MISC by Does not apply route 20-30 mmHg calf length  Patient not taking: Reported on 10/8/2020 6/5/19   Jamie Mauro MD   apixaban (ELIQUIS) 2.5 MG TABS tablet Take 1 tablet by mouth 2 times daily 1/3/19   Jamie Mauro MD   famotidine (PEPCID) 20 MG tablet Take 1 tablet by mouth daily 1/3/19   Jamie Mauro MD   rosuvastatin (CRESTOR) 40 MG tablet Take 1 tablet by mouth every evening 12/20/18   Jamie Mauro MD   aspirin 81 MG tablet Take 1 tablet by mouth daily 12/20/18   Jamie Mauro MD       Future Appointments   Date Time Provider Oscar Deras   5/7/2021  2:15 PM Milind Montoya MD [facility]   5/13/2021 11:15 AM MD Мария Diane  Abdullahi Miranda   9/13/2021 10:45 AM [facility]

## 2021-05-07 ENCOUNTER — OFFICE VISIT (OUTPATIENT)
Dept: GASTROENTEROLOGY | Age: 86
End: 2021-05-07
Payer: MEDICARE

## 2021-05-07 ENCOUNTER — HOSPITAL ENCOUNTER (OUTPATIENT)
Age: 86
Setting detail: SPECIMEN
Discharge: HOME | End: 2021-05-07
Payer: MEDICARE

## 2021-05-07 VITALS
WEIGHT: 152.8 LBS | DIASTOLIC BLOOD PRESSURE: 65 MMHG | HEART RATE: 78 BPM | BODY MASS INDEX: 22.55 KG/M2 | SYSTOLIC BLOOD PRESSURE: 101 MMHG | TEMPERATURE: 97 F | OXYGEN SATURATION: 100 %

## 2021-05-07 DIAGNOSIS — R63.4 WEIGHT LOSS: ICD-10-CM

## 2021-05-07 DIAGNOSIS — R63.4 WEIGHT LOSS: Primary | ICD-10-CM

## 2021-05-07 LAB
ANION GAP SERPL CALCULATED.3IONS-SCNC: 10 MMOL/L (ref 9–17)
BUN BLDV-MCNC: 47 MG/DL (ref 8–23)
BUN/CREAT BLD: ABNORMAL (ref 9–20)
CALCIUM SERPL-MCNC: 9.7 MG/DL (ref 8.6–10.4)
CHLORIDE BLD-SCNC: 98 MMOL/L (ref 98–107)
CO2: 28 MMOL/L (ref 20–31)
CREAT SERPL-MCNC: 3.78 MG/DL (ref 0.7–1.2)
GFR AFRICAN AMERICAN: 18 ML/MIN
GFR NON-AFRICAN AMERICAN: 15 ML/MIN
GFR SERPL CREATININE-BSD FRML MDRD: ABNORMAL ML/MIN/{1.73_M2}
GFR SERPL CREATININE-BSD FRML MDRD: ABNORMAL ML/MIN/{1.73_M2}
GLUCOSE BLD-MCNC: 87 MG/DL (ref 70–99)
LIPASE: 31 U/L (ref 13–60)
MAGNESIUM: 2.5 MG/DL (ref 1.6–2.6)
POTASSIUM SERPL-SCNC: 4.1 MMOL/L (ref 3.7–5.3)
SODIUM BLD-SCNC: 136 MMOL/L (ref 135–144)

## 2021-05-07 PROCEDURE — 99204 OFFICE O/P NEW MOD 45 MIN: CPT | Performed by: INTERNAL MEDICINE

## 2021-05-07 ASSESSMENT — ENCOUNTER SYMPTOMS
TROUBLE SWALLOWING: 0
VOICE CHANGE: 0
VOMITING: 0
SORE THROAT: 0
BACK PAIN: 0
SHORTNESS OF BREATH: 1
ABDOMINAL PAIN: 0
ABDOMINAL DISTENTION: 0
NAUSEA: 0
CONSTIPATION: 1
COUGH: 0
CHOKING: 0
RECTAL PAIN: 0
ANAL BLEEDING: 0
DIARRHEA: 0
BLOOD IN STOOL: 0

## 2021-05-07 NOTE — PROGRESS NOTES
Reason for Referral:   Reanna Luevano MD  [facility]  [address]  [place] [address]    Chief Complaint   Patient presents with    New Patient     Patient is here today as a new patient    Weight Loss     Patient is her toda due to weight loss       No diagnosis found. HISTORY OF PRESENT ILLNESS: Saray Pritchard is a 80 y.o. male with a past history remarkable for , referred for evaluation of   Chief Complaint   Patient presents with    New Patient     Patient is here today as a new patient    Weight Loss     Patient is her toda due to weight loss   . Patient seen with a history of poor appetite and weight loss. This 80-year-old gentleman, is having poor appetite in the last 5 to 6 months. He lost about 20 pounds during this time. However he denies nausea vomiting. No abdominal pain. No back pain. No dysphagia. No significant GERD symptoms. Bowel movements satisfactory without melena or hematochezia. On reviewing the labs this patient has renal insufficiency. Liver battery CBC within normal limits. proBNP is markedly elevated up to 6000. Thyroid tests within normal limits. BUN is 50 creatinine 3.46. Hemoglobin A1c 5.8. Iron levels, hemoglobin, B12 levels within normal limits. He lives alone, and he says that he cooks food for himself. He is able to drive and take care of himself without too many issues at home. Past Medical,Family, and Social History reviewed and does contribute to the patient presentingcondition. Patient's PMH/PSH,SH,PSYCH Hx, MEDs, ALLERGIES, and ROS were all reviewed and updated in the appropriate sections.     PAST MEDICAL HISTORY:  Past Medical History:   Diagnosis Date    BPH (benign prostatic hyperplasia)     secondary to reynolds    CAD (coronary artery disease) 1/18/2005    CRF (chronic renal failure) 01/01    patient denies kidney problems at pre-testing 2015    DVD (dissociated vertical deviation)     Gout     H/O cardiac catheterization 1/18/05    stents     HTN (hypertension)     Hx of blood clots     left leg (over 23 yrs ago)    Hypercholesteremia     Impotence     Irregular heartbeat     Nodular prostate     -bx Dr. Wills Reason 8/96 and focal inflammation 6/97    OA (osteoarthritis) of knee     Prostate cancer ([facility] [address] 1/06    Unspecified sleep apnea     no machine       Past Surgical History:   Procedure Laterality Date    CORONARY ANGIOPLASTY      CYSTOPLASTY N/A     urethral dilation 10/93 Dr Hugo Gordon Left     cataract    HAND SURGERY Right 1/08/15    Dr. Amaury Cao did right ring and little finger metacarpophalangeal joint extension    JOINT REPLACEMENT      PROSTATE SURGERY      seed implant    REVISION TOTAL HIP ARTHROPLASTY Left 05/07    Dr Rosemary Amezcua Right 10/20/2015       CURRENT MEDICATIONS:    Current Outpatient Medications:     fluticasone-vilanterol (BREO ELLIPTA) 100-25 MCG/INH AEPB inhaler, Inhale 2 puffs into the lungs daily, Disp: , Rfl:     polyethylene glycol (MIRALAX) 17 g PACK packet, Take 17 g by mouth daily, Disp: , Rfl:     Iron-Vitamins (GERITOL TONIC PO), Take by mouth daily Take 1 tablespoon by mouth daily. , Disp: , Rfl:     bumetanide (BUMEX) 2 MG tablet, take 1 tablet by mouth twice a day, Disp: , Rfl:     docusate sodium (COLACE) 100 MG capsule, Take 1 capsule by mouth daily as needed for Constipation, Disp: 30 capsule, Rfl: 1    Compression Stockings MISC, by Does not apply route 20-30 mmHg calf length, Disp: 1 each, Rfl: 0    apixaban (ELIQUIS) 2.5 MG TABS tablet, Take 1 tablet by mouth 2 times daily, Disp: 7 tablet, Rfl: 0    famotidine (PEPCID) 20 MG tablet, Take 1 tablet by mouth daily, Disp: 7 tablet, Rfl: 0    rosuvastatin (CRESTOR) 40 MG tablet, Take 1 tablet by mouth every evening, Disp: 90 tablet, Rfl: 3    aspirin 81 MG tablet, Take 1 tablet by mouth daily, Disp: 30 tablet, Rfl: 2    ALLERGIES:   Allergies   Allergen Reactions    Ace Inhibitors     Other      Beta-blockers slow heart rate    Timolol      Eye drops slow heart rate       FAMILY HISTORY:       Problem Relation Age of Onset    Coronary Art Dis Mother     Hypertension Mother     Cancer Brother          SOCIAL HISTORY:   Social History     Socioeconomic History    Marital status:      Spouse name: Not on file    Number of children: 10    Years of education: Not on file    Highest education level: Not on file   Occupational History    Occupation: Retired     Employer: [employer]   Social Needs    Financial resource strain: Not hard at all   [email or web address] insecurity     Worry: Never true     Inability: Never true   [employer] needs     Medical: No     Non-medical: No   Tobacco Use    Smoking status: Former Smoker     Types: Cigarettes     Quit date: 10/8/1983     Years since quittin.6    Smokeless tobacco: Never Used   Substance and Sexual Activity    Alcohol use: Not Currently     Comment: holidays (beer)    Drug use: No    Sexual activity: Not Currently     Partners: Female   Lifestyle    Physical activity     Days per week: 0 days     Minutes per session: 0 min    Stress: Not at all   Relationships    Social connections     Talks on phone: More than three times a week     Gets together: More than three times a week     Attends Congregational service: More than 4 times per year     Active member of club or organization: No     Attends meetings of clubs or organizations: Never     Relationship status:     Intimate partner violence     Fear of current or ex partner: Not on file     Emotionally abused: Not on file     Physically abused: Not on file     Forced sexual activity: Not on file   Other Topics Concern    Not on file   Social History Narrative    Turner Martin goes to see almost daily. REVIEW OF SYSTEMS:       Review of Systems   Constitutional: Positive for appetite change and unexpected weight change. Negative for fatigue. insufficiency. Advised him to have lipase levels done and to come back to the clinic in the next 3 to 4 weeks. At that time we will decide regarding further management if any for this pleasant gentleman    Spent 30 minutes providing patient education and counseling. Thank you for allowing me to participate in the care of Mr. Eri Tovar. For any further questions please do not hesitate to contact me. Note is dictated utilizing voice recognition software. Unfortunately this leads to occasional typographical errors. Please contact our office if you have any questions. I have reviewed and agree with the MA/LPN ROS.      Jan Marquez MD, [facility]  Board Certified in Gastroenterology and [address] [address] Gastroenterology  Office #: [phone]

## 2021-05-12 ENCOUNTER — CARE COORDINATION (OUTPATIENT)
Dept: CARE COORDINATION | Age: 86
End: 2021-05-12

## 2021-05-12 SDOH — ECONOMIC STABILITY: HOUSING INSECURITY
IN THE LAST 12 MONTHS, WAS THERE A TIME WHEN YOU DID NOT HAVE A STEADY PLACE TO SLEEP OR SLEPT IN A SHELTER (INCLUDING NOW)?: NO

## 2021-05-12 NOTE — CARE COORDINATION
Ambulatory Care Coordination Note  5/12/2021  CM Risk Score: 2  Charlson 10 Year Mortality Risk Score: 100%     ACC: Martin Leslie RN    Summary Note:  Attempted to contact patient for Care Management update, no answer, lvm to return call to this nurse at [phone]. If no return call, ACM will attempt to contact patient again next week. Patient returns call states he is doing pretty. Has appt with kidney doctor next week on 5/17/2021 and then he is to see GI. States GI ordered labs and is to follow up in couple weeks. He states he tries to eat but has no taste and especially with not being able to add seasonings. Instructed patient to try other no salt seasonings such as Mrs. Dash and to eat small frequent meals rather than large meals. He continues to drink nutritional shakes when he doesn't eat a meal.   CHF: states his weight has been stable, denies swelling of lower legs or feet. Instructed patient to do daily weights and to write down on a log and if he has a 3 lb weight gain from one day to the next or 5 lbs in week, he needs to call. -Reviewed CHF zone tool; when to call MD/Cardiology. - patient denies any increase in swelling, denies any increase in shortness of breath, or any other new concerns regarding heart/CHF today. Reminded patient he has office appt tomorrow. He states he has it on his calendar. CM Plan:  - will follow up with patient next week to follow up on CHF zone and med education. Care Coordination Interventions    Program Enrollment: Complex Care  Referral from Primary Care Provider: No  Suggested Interventions and Community Resources  Fall Risk Prevention: In Process  Marketplace Navigator: Completed (Comment: [employer] referral for [employer], 14 day meals delivered.)  Senior Services: Not Started  Zone Management Tools:  In Process (Comment: CHF zone tool)         Goals Addressed                 This Visit's Progress     Conditions and Symptoms   On track     I will schedule office visits, as directed by my provider. I will keep my appointment or reschedule if I have to cancel. I will notify my provider of any barriers to my plan of care. I will follow my Zone Management tool to seek urgent or emergent care. I will notify my provider of any symptoms that indicate a worsening of my condition. Barriers: fear of failure, overwhelmed by complexity of regimen, and lack of education  Plan for overcoming my barriers: will work with Care Manager  Confidence: 8/10  Anticipated Goal Completion Date: 8/1/2021              Prior to Admission medications    Medication Sig Start Date End Date Taking? Authorizing Provider   fluticasone-vilanterol (BREO ELLIPTA) 100-25 MCG/INH AEPB inhaler Inhale 2 puffs into the lungs daily   Yes Historical Provider, MD   polyethylene glycol (MIRALAX) 17 g PACK packet Take 17 g by mouth daily 4/8/21  Yes Historical Provider, MD   Iron-Vitamins (GERITOL TONIC PO) Take by mouth daily Take 1 tablespoon by mouth daily.    Yes Historical Provider, MD   bumetanide (BUMEX) 2 MG tablet take 1 tablet by mouth twice a day 3/18/21  Yes Historical Provider, MD   docusate sodium (COLACE) 100 MG capsule Take 1 capsule by mouth daily as needed for Constipation 3/4/21  Yes Turner Vega MD   Compression Stockings MISC by Does not apply route 20-30 mmHg calf length 6/5/19  Yes Turner Vega MD   apixaban (ELIQUIS) 2.5 MG TABS tablet Take 1 tablet by mouth 2 times daily 1/3/19  Yes Turner Vega MD   famotidine (PEPCID) 20 MG tablet Take 1 tablet by mouth daily 1/3/19  Yes Turner Vega MD   rosuvastatin (CRESTOR) 40 MG tablet Take 1 tablet by mouth every evening 12/20/18  Yes Turner Vega MD   aspirin 81 MG tablet Take 1 tablet by mouth daily 12/20/18  Yes Turner Vega MD       Future Appointments   Date Time Provider Oscar Deras   5/13/2021 11:15 AM Turner Vega MD [facility]   6/4/2021  3:15 PM Courtney Harrell MD [facility]   9/13/2021 10:45 AM Naseem Moran MD [facility]

## 2021-05-13 ENCOUNTER — OFFICE VISIT (OUTPATIENT)
Dept: INTERNAL MEDICINE CLINIC | Age: 86
End: 2021-05-13
Payer: MEDICARE

## 2021-05-13 VITALS
SYSTOLIC BLOOD PRESSURE: 90 MMHG | HEIGHT: 69 IN | HEART RATE: 68 BPM | WEIGHT: 150 LBS | BODY MASS INDEX: 22.22 KG/M2 | DIASTOLIC BLOOD PRESSURE: 56 MMHG | TEMPERATURE: 97.2 F | RESPIRATION RATE: 16 BRPM

## 2021-05-13 DIAGNOSIS — I73.9 PERIPHERAL VASCULAR DISEASE (HCC): ICD-10-CM

## 2021-05-13 DIAGNOSIS — C61 PROSTATE CANCER (HCC): ICD-10-CM

## 2021-05-13 DIAGNOSIS — R63.0 LOSS OF APPETITE: Primary | ICD-10-CM

## 2021-05-13 DIAGNOSIS — I10 ESSENTIAL HYPERTENSION: ICD-10-CM

## 2021-05-13 DIAGNOSIS — I50.23 ACUTE ON CHRONIC SYSTOLIC CONGESTIVE HEART FAILURE (HCC): ICD-10-CM

## 2021-05-13 DIAGNOSIS — N18.30 CHRONIC RENAL FAILURE, STAGE 3 (MODERATE), UNSPECIFIED WHETHER STAGE 3A OR 3B CKD (HCC): ICD-10-CM

## 2021-05-13 PROCEDURE — 99214 OFFICE O/P EST MOD 30 MIN: CPT | Performed by: INTERNAL MEDICINE

## 2021-05-13 RX ORDER — BISOPROLOL FUMARATE 5 MG/1
5 TABLET ORAL DAILY
COMMUNITY

## 2021-05-13 RX ORDER — SPIRONOLACTONE 25 MG/1
1 TABLET ORAL DAILY
COMMUNITY
Start: 2021-04-29 | End: 2022-05-06 | Stop reason: SDUPTHER

## 2021-05-13 NOTE — PROGRESS NOTES
Christianne Deleon is a 80 y.o. male who presents for   Chief Complaint   Patient presents with    Anorexia     just not hungry, stated he eats but food doesnt taste good without sodium. denies nausea    Immunizations     did receive COVID vaccine    and follow up of chronic medical problems. Patient Active Problem List   Diagnosis    Sleep apnea    Prostate cancer ([facility] [address]    CRF (chronic renal failure)    OA (osteoarthritis) of knee    Hypercholesteremia    Impotence    HTN (hypertension)    Closed fracture of shaft of metacarpal bone    Primary osteoarthritis of right hip    Peripheral vascular disease (HCC)     HPI  Here for follow-up from GI visit and issues with appetite and patient states that because he has no salt in the food it does not taste good and so even though he has an appetite he cannot eat much and also have questions about his blood pressure and the medications    Current Outpatient Medications   Medication Sig Dispense Refill    spironolactone (ALDACTONE) 25 MG tablet Take 1 tablet by mouth daily      bisoprolol (ZEBETA) 5 MG tablet Take 5 mg by mouth daily      fluticasone-vilanterol (BREO ELLIPTA) 100-25 MCG/INH AEPB inhaler Inhale 2 puffs into the lungs daily      polyethylene glycol (MIRALAX) 17 g PACK packet Take 17 g by mouth daily      Iron-Vitamins (GERITOL TONIC PO) Take by mouth daily Take 1 tablespoon by mouth daily.       bumetanide (BUMEX) 2 MG tablet take 1 tablet by mouth twice a day      docusate sodium (COLACE) 100 MG capsule Take 1 capsule by mouth daily as needed for Constipation 30 capsule 1    apixaban (ELIQUIS) 2.5 MG TABS tablet Take 1 tablet by mouth 2 times daily 7 tablet 0    famotidine (PEPCID) 20 MG tablet Take 1 tablet by mouth daily 7 tablet 0    rosuvastatin (CRESTOR) 40 MG tablet Take 1 tablet by mouth every evening 90 tablet 3    aspirin 81 MG tablet Take 1 tablet by mouth daily (Patient taking differently: Take 81 mg by mouth three times a week ) 30 tablet 2     No current facility-administered medications for this visit.         Allergies   Allergen Reactions    Ace Inhibitors     Other      Beta-blockers slow heart rate    Timolol      Eye drops slow heart rate       Past Medical History:   Diagnosis Date    BPH (benign prostatic hyperplasia)     secondary to reynolds    CAD (coronary artery disease) 1/18/2005    CRF (chronic renal failure) 01/01    patient denies kidney problems at pre-testing 2015    DVD (dissociated vertical deviation)     Gout     H/O cardiac catheterization 1/18/05    stents     HTN (hypertension)     Hx of blood clots     left leg (over 23 yrs ago)    Hypercholesteremia     Impotence     Irregular heartbeat     Nodular prostate     -bx Dr. Marek Young 8/96 and focal inflammation 6/97    OA (osteoarthritis) of knee     Prostate cancer ([facility] [address] 1/06    Unspecified sleep apnea     no machine       Past Surgical History:   Procedure Laterality Date    CORONARY ANGIOPLASTY      CYSTOPLASTY N/A     urethral dilation 10/93 Dr Gabriel Harman Left     cataract    HAND SURGERY Right 1/08/15    Dr. Karon Forrester did right ring and little finger metacarpophalangeal joint extension    JOINT REPLACEMENT      PROSTATE SURGERY      seed implant    REVISION TOTAL HIP ARTHROPLASTY Left 05/07    Dr Donis Fitting Right 10/20/2015       Family History   Problem Relation Age of Onset    Coronary Art Dis Mother     Hypertension Mother     Cancer Brother      ROS   Constitutional:  Negative for fatigue, loss of appetite and unexpected weight change   HEENT            : Negative for neck stiffness and pain, no congestion or sinus pressure   Eyes                : No visual disturbance or pain   Cardiovascular: No chest pain or palpitations or leg swelling   Respiratory      : Negative for cough, shortness of breath or wheezing   Gastrointestinal: Negative for abdominal pain, constipation or diarrhea and bloating No nausea or vomiting   Genitourinary:     No urgency or frequency, no burning or hematuria   Musculoskeletal: Positive for arthralgias, back pain or myalgias   Skin                  : Negative for rash or erythema   Neurological    : Negative for dizziness, weakness, tremors ,light headedness or syncope   Psychiatric       : Negative for dysphoric mood, sleep disturbances, nervous or anxious, or decreased concentration   All other review of systems was negative    Objective  Physical Examination:    Nursing note reviewed    BP (!) 90/56 (Site: Right Upper Arm, Position: Sitting, Cuff Size: Medium Adult)   Pulse 68   Temp 97.2 °F (36.2 °C) (Infrared)   Resp 16   Ht 5' 9\" (1.753 m)   Wt 150 lb (68 kg)   BMI 22.15 kg/m²   BP Readings from Last 3 Encounters:   05/13/21 (!) 90/56   05/07/21 101/65   04/01/21 (!) 100/58         Constitutional:  Jenifer Butterfield is oriented to place, person and time ,appears well-developed and well-nourished  HEENT:  Atraumatic and normocephalic, external ears normal bilaterally, nose normal no oropharyngeal exudate and is clear and moist  Eyes:  EOCM normal; conjunctivae normal; PERRLA bilaterally  Neck:  Normal range of motion, neck supple, no JVD and no thyromegaly  Cardiovascular:  RRR, normal heart sounds and intact distal pulses  Pulmonary:  effort normal and breath sounds normal bilaterally,no wheezes or rales, no respiratory distress  Abdominal:  Soft, non-tender; normal bowel sounds, no masses  Musculoskeletal: Very limited range of motion and no edema or tenderness bilaterally  No lymphadenopathy  Neurological:  alert, oriented, and normal reflexes bilaterally  Skin: warm and dry  Psychiatric:  normal mood and effect; behavior normal.    Labs:   Lab Results   Component Value Date    LABA1C 5.8 02/10/2021     Lab Results   Component Value Date    CHOL 149 11/06/2020     Lab Results   Component Value Date    HDL 58 04/28/2021     Lab Results   Component Value Date reviewed and no work-up contemplated at this time and will be waiting to see the nephrologist as GI thinks it was all related to his renal failure and patient's last creatinine was 3.78 on 7 May and his lipase levels were within normal limits as well as magnesium  Patient's weight remained stable in the last 1 month   Patient has very limited mobility because of arthritis and because of his age he has difficulty getting up from the sitting position to standing position and will order a lift chair for helping him in daily activities  Review in 4 months           1. Laurie Mendez received counseling on the following healthy behaviors: nutrition and exercise    2. Prior labs and health maintenance reviewed. 3.  Discussed use, benefit, and side effects of prescribed medications. Barriers to medication compliance addressed. All his questions were answered. Pt voiced understanding. Laurie Mendez will continue current medications, diet and exercise. No orders of the defined types were placed in this encounter. Completed Refills               Requested Prescriptions      No prescriptions requested or ordered in this encounter     4. Patient given educational materials - see patient instructions    5. Was a self-tracking handout given in paper form or via [employer]? NO    No orders of the defined types were placed in this encounter. No follow-ups on file. Patient voiced understanding and agreed to treatment plan. Electronically signed by Mare Kelley MD on 5/13/2021 at 11:56 AM    This note is created with a voice recognition program and while intend to generate a document that accurately reflects the content of the visit, no guarantee can be provided that every mistake has been identified and corrected by editing.

## 2021-06-04 ENCOUNTER — OFFICE VISIT (OUTPATIENT)
Dept: GASTROENTEROLOGY | Age: 86
End: 2021-06-04
Payer: MEDICARE

## 2021-06-04 VITALS
WEIGHT: 147.4 LBS | HEART RATE: 76 BPM | TEMPERATURE: 96.8 F | BODY MASS INDEX: 21.83 KG/M2 | OXYGEN SATURATION: 100 % | SYSTOLIC BLOOD PRESSURE: 96 MMHG | HEIGHT: 69 IN | DIASTOLIC BLOOD PRESSURE: 57 MMHG

## 2021-06-04 DIAGNOSIS — I50.9 CONGESTIVE HEART FAILURE, UNSPECIFIED HF CHRONICITY, UNSPECIFIED HEART FAILURE TYPE (HCC): ICD-10-CM

## 2021-06-04 DIAGNOSIS — R63.4 WEIGHT LOSS: Primary | ICD-10-CM

## 2021-06-04 DIAGNOSIS — R63.0 ANOREXIA: ICD-10-CM

## 2021-06-04 DIAGNOSIS — N28.9 RENAL INSUFFICIENCY: ICD-10-CM

## 2021-06-04 PROCEDURE — 99214 OFFICE O/P EST MOD 30 MIN: CPT | Performed by: INTERNAL MEDICINE

## 2021-06-04 ASSESSMENT — ENCOUNTER SYMPTOMS
VOMITING: 0
DIARRHEA: 0
RECTAL PAIN: 0
ABDOMINAL DISTENTION: 0
BACK PAIN: 0
ABDOMINAL PAIN: 0
TROUBLE SWALLOWING: 0
SORE THROAT: 0
COUGH: 0
VOICE CHANGE: 0
NAUSEA: 0
ANAL BLEEDING: 0
SHORTNESS OF BREATH: 1
CHOKING: 0
BLOOD IN STOOL: 0
CONSTIPATION: 1

## 2021-06-04 NOTE — PROGRESS NOTES
 Hx of blood clots     left leg (over 23 yrs ago)    Hypercholesteremia     Impotence     Irregular heartbeat     Nodular prostate     -bx Dr. Wills Reason 8/96 and focal inflammation 6/97    OA (osteoarthritis) of knee     Prostate cancer ([facility] [address] 1/06    Unspecified sleep apnea     no machine       Past Surgical History:   Procedure Laterality Date    CORONARY ANGIOPLASTY      CYSTOPLASTY N/A     urethral dilation 10/93 Dr Hugo Gordon Left     cataract    HAND SURGERY Right 1/08/15    Dr. Amaury Cao did right ring and little finger metacarpophalangeal joint extension    JOINT REPLACEMENT      PROSTATE SURGERY      seed implant    REVISION TOTAL HIP ARTHROPLASTY Left 05/07    Dr Rosemary Amezcua Right 10/20/2015       CURRENT MEDICATIONS:    Current Outpatient Medications:     spironolactone (ALDACTONE) 25 MG tablet, Take 1 tablet by mouth daily, Disp: , Rfl:     bisoprolol (ZEBETA) 5 MG tablet, Take 5 mg by mouth daily, Disp: , Rfl:     fluticasone-vilanterol (BREO ELLIPTA) 100-25 MCG/INH AEPB inhaler, Inhale 2 puffs into the lungs daily, Disp: , Rfl:     polyethylene glycol (MIRALAX) 17 g PACK packet, Take 17 g by mouth daily, Disp: , Rfl:     Iron-Vitamins (GERITOL TONIC PO), Take by mouth daily Take 1 tablespoon by mouth daily. , Disp: , Rfl:     bumetanide (BUMEX) 2 MG tablet, take 1 tablet by mouth twice a day, Disp: , Rfl:     docusate sodium (COLACE) 100 MG capsule, Take 1 capsule by mouth daily as needed for Constipation, Disp: 30 capsule, Rfl: 1    apixaban (ELIQUIS) 2.5 MG TABS tablet, Take 1 tablet by mouth 2 times daily, Disp: 7 tablet, Rfl: 0    famotidine (PEPCID) 20 MG tablet, Take 1 tablet by mouth daily, Disp: 7 tablet, Rfl: 0    rosuvastatin (CRESTOR) 40 MG tablet, Take 1 tablet by mouth every evening, Disp: 90 tablet, Rfl: 3    aspirin 81 MG tablet, Take 1 tablet by mouth daily (Patient taking differently: Take 81 mg by mouth three times a Organization Meetings: Never    Marital Status:    Intimate Partner Violence:     Fear of Current or Ex-Partner:     Emotionally Abused:     Physically Abused:     Sexually Abused:          REVIEW OF SYSTEMS:         Review of Systems   Constitutional: Positive for appetite change and unexpected weight change. Negative for fatigue. HENT: Negative. Negative for dental problem, sore throat, trouble swallowing and voice change. Eyes: Positive for visual disturbance (glasses). Respiratory: Positive for shortness of breath. Negative for cough and choking. Cardiovascular: Negative. Negative for chest pain and leg swelling. Gastrointestinal: Positive for constipation. Negative for abdominal distention, abdominal pain, anal bleeding, blood in stool, diarrhea, nausea, rectal pain and vomiting. Genitourinary: Negative. Negative for difficulty urinating. Musculoskeletal: Negative. Negative for back pain, joint swelling and myalgias. Neurological: Positive for light-headedness (improved). Negative for dizziness, tremors, weakness, numbness and headaches. Hematological: Does not bruise/bleed easily. Psychiatric/Behavioral: Negative for sleep disturbance. The patient is not nervous/anxious. PHYSICAL EXAMINATION:     Vital signs reviewed per the nursing documentation. BP (!) 96/57   Pulse 76   Temp 96.8 °F (36 °C)   Ht 5' 9\" (1.753 m)   Wt 147 lb 6.4 oz (66.9 kg)   SpO2 100%   BMI 21.77 kg/m²   Body mass index is 21.77 kg/m². Physical Exam  Patient appears to be on the dry state. Abdominal examination benign. No acute distention or tenderness. No mass palpable. Lungs expands apparently. No wheezing or rails. Cardiac examination revealed systolic murmur.         LABORATORY DATA: Reviewed  Lab Results   Component Value Date    WBC 4.9 04/28/2021    HGB 14.7 04/28/2021    HGB 14.7 04/28/2021    HCT 47.4 04/28/2021    HCT 47.4 04/28/2021    MCV 85.7 04/28/2021    PLT 162 04/28/2021     05/07/2021    K 4.1 05/07/2021    CL 98 05/07/2021    CO2 28 05/07/2021    BUN 47 (H) 05/07/2021    CREATININE 3.78 (H) 05/07/2021    LABPROT 5.3 (L) 11/18/2012    LABALBU 3.7 04/28/2021    LABALBU 3.7 04/28/2021    BILITOT 0.65 04/28/2021    BILITOT 0.65 04/28/2021    ALKPHOS 74 04/28/2021    ALKPHOS 74 04/28/2021    AST 28 04/28/2021    AST 28 04/28/2021    ALT 16 04/28/2021    ALT 16 04/28/2021    INR 1.2 05/12/2020         Lab Results   Component Value Date    RBC 5.53 04/28/2021    HGB 14.7 04/28/2021    HGB 14.7 04/28/2021    MCV 85.7 04/28/2021    MCH 26.6 04/28/2021    MCHC 31.0 04/28/2021    RDW 15.3 (H) 04/28/2021    MPV 11.3 04/28/2021    BASOPCT 1 10/08/2015    LYMPHSABS 1.34 10/08/2015    MONOSABS 0.64 10/08/2015    NEUTROABS 1.53 (L) 10/08/2015    EOSABS 0.07 10/08/2015    BASOSABS 0.02 10/08/2015         DIAGNOSTIC TESTING:     No results found. Assessment  1. Weight loss    2. Anorexia    3. Renal insufficiency    4. Congestive heart failure, unspecified HF chronicity, unspecified heart failure type [facility])        Plan  This patient has anorexia. He has a renal insufficiency which may be the etiology for his symptoms. Also patient has significant cardiac issues. With further preparation for GI work-up/procedures his renal status may get worse leading to dialysis. It may be difficult for him to go through dialysis given his cardiac status and age. After discussion with the patient and also Dr. Irina Dillard, the consensus is not to proceed with a GI work-up at this time. Discussed with the patient regarding nutritious diet. However he stated that because of renal insufficiency he is following a renal diet. We will see him on an intermittent basis if his status changes. Thank you for allowing me to participate in the care of Mr. Elder Villanueva. For any further questions please do not hesitate to contact me. I have reviewed and agree with the ROS entered by the MA/LPN.

## 2021-06-07 NOTE — PROGRESS NOTES
GI Dr. Delaney Lopez called me and discussed with me on Friday regarding patient's visit to the office and his discussion with the patient regarding loss of appetite and in view of his age and his renal function patient was advised to discuss with the nephrologist and at this time with mutual consent EGD or colonoscopy are on hold and will follow as needed

## 2021-06-28 ENCOUNTER — TELEPHONE (OUTPATIENT)
Dept: INTERNAL MEDICINE CLINIC | Age: 86
End: 2021-06-28

## 2021-06-28 DIAGNOSIS — M17.9 OSTEOARTHRITIS OF KNEE, UNSPECIFIED LATERALITY, UNSPECIFIED OSTEOARTHRITIS TYPE: ICD-10-CM

## 2021-06-28 DIAGNOSIS — N18.30 CHRONIC RENAL FAILURE, STAGE 3 (MODERATE), UNSPECIFIED WHETHER STAGE 3A OR 3B CKD (HCC): ICD-10-CM

## 2021-06-28 DIAGNOSIS — R06.02 SHORTNESS OF BREATH: ICD-10-CM

## 2021-06-28 DIAGNOSIS — M16.11 PRIMARY OSTEOARTHRITIS OF RIGHT HIP: Primary | ICD-10-CM

## 2021-06-30 ENCOUNTER — CARE COORDINATION (OUTPATIENT)
Dept: CARE COORDINATION | Age: 86
End: 2021-06-30

## 2021-06-30 NOTE — CARE COORDINATION
Ambulatory Care Coordination Note  6/30/2021  CM Risk Score: 2  Charlson 10 Year Mortality Risk Score: 100%     ACC: Cynthia Silvestre RN    Summary Note: outreach call and spoke to patient who states he is doing well  Patient states he has no appetite, he eats breakfast, Ensure for lunch and then dinner. States he is on kidney and heart diet since he can't use seasonings, food just doesn't taste very good. He is allowed 8 cups of water per day. CM suggested he eat small frequent meals or snacks and to try replacing some of the water for ice. Patient saw GI and noted patient has anorexia and may be due to renal insufficiency. Dr. Zainab Singh discussed with Dr. Christina Paz who agreed for patient not to have any further testing such as EGD or colonoscopy. CHF: patient's weight has been the same. Denies having swelling of legs or feet, states occasionally he gets little swelling but goes away by morning. Patient states he has a little device and a scale that his insurance - [employer] sent to him and it checks his weight and BP and send readings to them. Informed him that order was sent to [facility] for a lift chair. CM will follow up in 1-2 weeks. Discussed with patient the safety and precautions recommended by the CDC for COVID virus. Encouraged to get the COVID vaccine. CM Plan:  - follow up with patient for CHF zone education and on lift chair in 1-2 weeks.      Care Coordination Interventions    Program Enrollment: Complex Care  Referral from Primary Care Provider: No  Suggested Interventions and Community Resources  Fall Risk Prevention: Completed  Marketplace Navigator: Completed (Comment: [employer] referral for [employer], 14 day meals delivered.)  Senior Services: Not Started  Zone Management Tools: Completed (Comment: CHF zone tool)         Goals Addressed                 This Visit's Progress     Conditions and Symptoms   On track     I will schedule office visits, as directed by my Callum Grossman MD       Future Appointments   Date Time Provider Oscar Deras   9/13/2021 10:45 AM Callum Grossman MD [facility]   9/24/2021  1:00 PM MD benjy Ferguson [facility] Shonda Carter

## 2021-07-19 ENCOUNTER — CARE COORDINATION (OUTPATIENT)
Dept: CARE COORDINATION | Age: 86
End: 2021-07-19

## 2021-07-19 NOTE — CARE COORDINATION
Ambulatory Care Coordination Note  7/19/2021  CM Risk Score: 2  Charlson 10 Year Mortality Risk Score: 100%     ACC: Chandra High RN    Summary Note:  Spoke to Lubertha Boeck who states he is doing fairly well. He denies having any current symptoms of concern. He tries to eat breakfast and dinner and drinks Ensure for lunch. States he called and asked for a Lift Chair because he has trouble getting up from sitting he hasn't heard anything back yet. CM will follow up with [facility]. He denies having a recent fall. He is interested in getting a Life Alert system. CM will send referral to . CHF: Patient denies having a weight change or having lower leg/foot swelling. He has been weighting self at night and last night weight was 145 lbs. Educated patient that he needs to do weights in the morning and stressed the zones and when he needs to call nurse or the office. Patient verbalized understanding. states he has appointment next month with kidney dr. Louis Hernandez to him about ACP docs and he states he has a Living will for medical. States his dtr Alfredito Quick is POA. instructed him to take Living will to the office next visit to get put in his chart. Discussed briefly about Code status and he's not sure about some of it, he is okay for referral to ACP specialist.     CM Plan:  - will update Dr. Audrey Saez and follow up with patient next week. - verify order for Lift Chair with [facility].   -send referral to  for assistance with Life Alert and ACP specialist referral to Dietician for assistance with Ensure supplements. - inquire about COVID vaccine on next outreach.     Care Coordination Interventions    Program Enrollment: Complex Care  Referral from Primary Care Provider: No  Suggested Interventions and Community Resources  Fall Risk Prevention: Completed  Marketplace Navigator: Completed (Comment: [employer] referral for [employer], 14 day meals delivered.)  Meals on Wheels: Completed  Senior Services: Completed  Zone Management Tools: Completed (Comment: CHF zone tool)         Goals Addressed    None         Prior to Admission medications    Medication Sig Start Date End Date Taking? Authorizing Provider   Lift Chair [id number] [facility] by Does not apply route 6/28/21   Marek Burkett MD   spironolactone (ALDACTONE) 25 MG tablet Take 1 tablet by mouth daily 4/29/21   Historical Provider, MD   bisoprolol (ZEBETA) 5 MG tablet Take 5 mg by mouth daily    Historical Provider, MD   fluticasone-vilanterol (BREO ELLIPTA) 100-25 MCG/INH AEPB inhaler Inhale 2 puffs into the lungs daily    Historical Provider, MD   polyethylene glycol (MIRALAX) 17 g PACK packet Take 17 g by mouth daily 4/8/21   Historical Provider, MD   Iron-Vitamins (GERITOL TONIC PO) Take by mouth daily Take 1 tablespoon by mouth daily.     Historical Provider, MD   bumetanide (BUMEX) 2 MG tablet take 1 tablet by mouth twice a day 3/18/21   Historical Provider, MD   docusate sodium (COLACE) 100 MG capsule Take 1 capsule by mouth daily as needed for Constipation 3/4/21   Marek Burkett MD   apixaban (ELIQUIS) 2.5 MG TABS tablet Take 1 tablet by mouth 2 times daily 1/3/19   Marek Burkett MD   famotidine (PEPCID) 20 MG tablet Take 1 tablet by mouth daily 1/3/19   Marek Burkett MD   rosuvastatin (CRESTOR) 40 MG tablet Take 1 tablet by mouth every evening 12/20/18   Marek Burkett MD   aspirin 81 MG tablet Take 1 tablet by mouth daily  Patient taking differently: Take 81 mg by mouth three times a week  12/20/18   Marek Burkett MD       Future Appointments   Date Time Provider Oscar Deras   9/13/2021 10:45 AM Marek Burkett MD [id number]   9/24/2021  1:00 PM MD benjy Tejada

## 2021-07-19 NOTE — CARE COORDINATION
Return call from Alonzo Emery at [facility] who states they received the order for Lift Chair and she will send the CMS form to be filled out by Provider. ACM will follow up next week.
Link To The Follow Chief Complaint: solitary
Note Text (......Xxx Chief Complaint.): This diagnosis correlates with the
Detail Level: Zone

## 2021-07-20 ENCOUNTER — CARE COORDINATION (OUTPATIENT)
Dept: CARE COORDINATION | Age: 86
End: 2021-07-20

## 2021-07-20 NOTE — CARE COORDINATION
Registered Dietitian Initial Assessment for Care Coordination      Jeanine Meeks  July 20, 2021    Initial Referral Reason: decreased appetite    Patient Care Team:  Haris Sandoval MD as PCP - General  Haris Sandoval MD as PCP - [facility]  Faye Fox MD as Consulting Physician (Cardiology)  Eddie Gallegos MD as Consulting Physician (Nephrology)  Paul Steward RN as [address] [place] [address]  Oskar Butt MD as Consulting Physician (Gastroenterology)  Evan Mantilla RD, JUSTINE as Dietitian  Evan Mantilla RD, LD as Dietitian    Patient Active Problem List   Diagnosis    Sleep apnea    Prostate cancer [facility])    CRF (chronic renal failure)    OA (osteoarthritis) of knee    Hypercholesteremia    Impotence    HTN (hypertension)    Closed fracture of shaft of metacarpal bone    Primary osteoarthritis of right hip    Peripheral vascular disease ([address]       Current Outpatient Medications   Medication Sig Dispense Refill    Lift Chair MISC by Does not apply route (Patient not taking: Reported on 7/19/2021) 1 each 0    spironolactone (ALDACTONE) 25 MG tablet Take 1 tablet by mouth daily      bisoprolol (ZEBETA) 5 MG tablet Take 5 mg by mouth daily      fluticasone-vilanterol (BREO ELLIPTA) 100-25 MCG/INH AEPB inhaler Inhale 2 puffs into the lungs daily      polyethylene glycol (MIRALAX) 17 g PACK packet Take 17 g by mouth daily      Iron-Vitamins (GERITOL TONIC PO) Take by mouth daily Take 1 tablespoon by mouth daily.       bumetanide (BUMEX) 2 MG tablet take 1 tablet by mouth twice a day      docusate sodium (COLACE) 100 MG capsule Take 1 capsule by mouth daily as needed for Constipation 30 capsule 1    apixaban (ELIQUIS) 2.5 MG TABS tablet Take 1 tablet by mouth 2 times daily 7 tablet 0    famotidine (PEPCID) 20 MG tablet Take 1 tablet by mouth daily 7 tablet 0    rosuvastatin (CRESTOR) 40 MG tablet Take 1 tablet by mouth every evening 90 tablet 3    aspirin 81 MG tablet Take 1 tablet by mouth daily (Patient taking differently: Take 81 mg by mouth three times a week ) 30 tablet 2     No current facility-administered medications for this visit.          Visit for:  Obesity/Weight loss  Diabetes:  Hypertension:  Hyperlipidemia:  Other: poor appetite     Anthropometric Measurements:  HT: 5'9  Weight: 147  IBW: 160 + or - 10%  BMI: 21      Biochemical Data, Medical Tests and Procedures:    Lab Results   Component Value Date    LABA1C 5.8 02/10/2021     Lab Results   Component Value Date     02/10/2021       Lab Results   Component Value Date    CHOL 149 11/06/2020    CHOL 145 09/03/2016    CHOL 130 05/16/2016     Lab Results   Component Value Date    TRIG 32 11/06/2020    TRIG 42 09/03/2016    TRIG 50 05/16/2016     Lab Results   Component Value Date    HDL 58 04/28/2021    HDL 71 02/10/2021    HDL 88 11/06/2020     Lab Results   Component Value Date    LDLCALC 64 05/16/2016    LDLCHOLESTEROL 63 04/28/2021    LDLCHOLESTEROL 61 02/10/2021    LDLCHOLESTEROL 55 11/06/2020     Lab Results   Component Value Date    VLDL NOT REPORTED 04/28/2021    VLDL NOT REPORTED 02/10/2021    VLDL NOT REPORTED 11/06/2020     Lab Results   Component Value Date    CHOLHDLRATIO 2.3 04/28/2021    CHOLHDLRATIO 2.0 02/10/2021    CHOLHDLRATIO 1.7 11/06/2020       Lab Results   Component Value Date    WBC 4.9 04/28/2021    HGB 14.7 04/28/2021    HGB 14.7 04/28/2021    HCT 47.4 04/28/2021    HCT 47.4 04/28/2021    MCV 85.7 04/28/2021     04/28/2021       Lab Results   Component Value Date    CREATININE 3.78 (H) 05/07/2021    BUN 47 (H) 05/07/2021     05/07/2021    K 4.1 05/07/2021    CL 98 05/07/2021    CO2 28 05/07/2021         NUTRITION DIAGNOSIS    #1 Problem  Inadequate energy intake       Etiology  related to decreased appetitie       Signs/Symptoms  as evidenced by significant weight loss- 40# over past 8 months    NUTRITION INTERVENTION  Nutrition Prescription:    regular diet Calorie needs: 9779-1258 cals/d  Protein needs: 70gms/d  Fluid needs: 2000cc/day      Patient Goals:  1. Discussed nutritional supplement like- Ensure, patient is currently drinking Ensure for lunch daily. Explained the difference between supplements like -original and Ensure Plus- if patient needs more calories due to decreased appetite. Patient relays will try Ensure Plus next time purchases product. Encouraged to use Ensure twice daily between meals. Will mail patient coupons for Ensure. 2. Discussed eating small frequent meals/snacks.  Patient is only eating 2-3 times a day. Encouraged to increase to small frequent meals every 2-3 hours, goal is 6-8 small meals/day. 3. Discussed high calorie/protein foods. Reviewed list of high calorie/protein foods and snacks that will be mailed to patient's home. Encouraged to include at least 1 food item off list at each small meal daily.      Nutrition Intervention Need:  Initial/Brief:  Comprehensive Nutrition Education: X  Coordination of other care during nutrition care:    Monitoring and Evaluation:  Ability to plan meals/snacks: X  Ability to select healthy foods/meals:  Food and Nutrition Knowledge: X  Self Monitoring:  Physical Activity:  Energy intake: X  Fluid/beverage intake:  Fat/chol intake:  Carb intake: Other:    Plan:  1. Will continue to educate patient on importance of eating more often-small frequent meals, drinking Ensure supplement daily, choosing high calorie/ protein foods and snacks daily, and staying hydrated. Patient will be provided with nutrition information on all the above discussed.   2. Will follow up in 2-3 weeks to review nutrition information and answer questions. Will send coupons for Ensure to patient's home and information on discount at [facility] first week of the month-sent to home.     JUSTINE Mendoza

## 2021-07-20 NOTE — CARE COORDINATION
Ambulatory Care Coordination Note  7/20/2021  CM Risk Score: 2  Charlson 10 Year Mortality Risk Score: 100%     ACC: Abdullahi Burgos, RN    Summary Note: outreach call and informed patient that he can go into the office to  some Encure samples and coupons. Also, informed him that on the 2nd Tuesday of each month [employer] has senior day and people > 55 can get additional 20% off and he can use the ensure coupons. Told him the next day is Aug 10th. Patient very appreciative of all the help. He stated the dietician called and spoke to him today too. Informed him that [address] is to send a form for medicare to the office regarding the Lift chair. Will keep patient updated on the progress of order for chair. CM will follow up with patient in 1-2 weeks. Care Coordination Interventions    Program Enrollment: Complex Care  Referral from Primary Care Provider: No  Suggested Interventions and Community Resources  Fall Risk Prevention: Completed  Marketplace Navigator: Completed (Comment: [employer] referral for [employer], 14 day meals delivered.)  Meals on Wheels: Completed  Senior Services: Completed  Zone Management Tools: Completed (Comment: CHF zone tool)         Goals Addressed                 This Visit's Progress     Conditions and Symptoms   On track     I will schedule office visits, as directed by my provider. I will keep my appointment or reschedule if I have to cancel. I will notify my provider of any barriers to my plan of care. I will follow my Zone Management tool to seek urgent or emergent care. I will notify my provider of any symptoms that indicate a worsening of my condition.     Barriers: fear of failure, overwhelmed by complexity of regimen, and lack of education  Plan for overcoming my barriers: will work with Care Manager  Confidence: 8/10  Anticipated Goal Completion Date: 8/1/2021              Prior to Admission medications    Medication Sig Start Date End

## 2021-07-21 ENCOUNTER — CARE COORDINATION (OUTPATIENT)
Dept: CARE COORDINATION | Age: 86
End: 2021-07-21

## 2021-07-29 ENCOUNTER — HOSPITAL ENCOUNTER (OUTPATIENT)
Age: 86
Setting detail: SPECIMEN
Discharge: HOME | End: 2021-07-29
Payer: MEDICARE

## 2021-07-29 LAB
-: NORMAL
ALBUMIN SERPL-MCNC: 3.9 G/DL (ref 3.5–5.2)
ALBUMIN SERPL-MCNC: 3.9 G/DL (ref 3.5–5.2)
ALBUMIN/GLOBULIN RATIO: 1.3 (ref 1–2.5)
ALBUMIN/GLOBULIN RATIO: 1.3 (ref 1–2.5)
ALP BLD-CCNC: 87 U/L (ref 40–129)
ALP BLD-CCNC: 87 U/L (ref 40–129)
ALT SERPL-CCNC: 15 U/L (ref 5–41)
ALT SERPL-CCNC: 15 U/L (ref 5–41)
AMORPHOUS: NORMAL
ANION GAP SERPL CALCULATED.3IONS-SCNC: 10 MMOL/L (ref 9–17)
ANION GAP SERPL CALCULATED.3IONS-SCNC: 10 MMOL/L (ref 9–17)
AST SERPL-CCNC: 32 U/L
AST SERPL-CCNC: 32 U/L
BACTERIA: NORMAL
BILIRUB SERPL-MCNC: 0.63 MG/DL (ref 0.3–1.2)
BILIRUB SERPL-MCNC: 0.63 MG/DL (ref 0.3–1.2)
BILIRUBIN URINE: NEGATIVE
BNP INTERPRETATION: ABNORMAL
BUN BLDV-MCNC: 63 MG/DL (ref 8–23)
BUN BLDV-MCNC: 63 MG/DL (ref 8–23)
BUN/CREAT BLD: ABNORMAL (ref 9–20)
BUN/CREAT BLD: ABNORMAL (ref 9–20)
CALCIUM SERPL-MCNC: 9.7 MG/DL (ref 8.6–10.4)
CALCIUM SERPL-MCNC: 9.7 MG/DL (ref 8.6–10.4)
CASTS UA: NORMAL /LPF (ref 0–8)
CHLORIDE BLD-SCNC: 96 MMOL/L (ref 98–107)
CHLORIDE BLD-SCNC: 96 MMOL/L (ref 98–107)
CO2: 29 MMOL/L (ref 20–31)
CO2: 29 MMOL/L (ref 20–31)
COLOR: YELLOW
COMMENT UA: ABNORMAL
CREAT SERPL-MCNC: 3.51 MG/DL (ref 0.7–1.2)
CREAT SERPL-MCNC: 3.51 MG/DL (ref 0.7–1.2)
CREATININE URINE: 50.1 MG/DL (ref 39–259)
CRYSTALS, UA: NORMAL /HPF
EPITHELIAL CELLS UA: NORMAL /HPF (ref 0–5)
GFR AFRICAN AMERICAN: 20 ML/MIN
GFR AFRICAN AMERICAN: 20 ML/MIN
GFR NON-AFRICAN AMERICAN: 16 ML/MIN
GFR NON-AFRICAN AMERICAN: 16 ML/MIN
GFR SERPL CREATININE-BSD FRML MDRD: ABNORMAL ML/MIN/{1.73_M2}
GLUCOSE BLD-MCNC: 97 MG/DL (ref 70–99)
GLUCOSE BLD-MCNC: 97 MG/DL (ref 70–99)
GLUCOSE URINE: NEGATIVE
HCT VFR BLD CALC: 42.7 % (ref 40.7–50.3)
HEMOGLOBIN: 13.1 G/DL (ref 13–17)
KETONES, URINE: NEGATIVE
LEUKOCYTE ESTERASE, URINE: NEGATIVE
MUCUS: NORMAL
NITRITE, URINE: NEGATIVE
OTHER OBSERVATIONS UA: NORMAL
PH UA: 7 (ref 5–8)
PHOSPHORUS: 3.4 MG/DL (ref 2.5–4.5)
POTASSIUM SERPL-SCNC: 4.2 MMOL/L (ref 3.7–5.3)
POTASSIUM SERPL-SCNC: 4.2 MMOL/L (ref 3.7–5.3)
PRO-BNP: 2746 PG/ML
PROTEIN UA: NEGATIVE
PTH INTACT: 139.5 PG/ML (ref 15–65)
RBC UA: NORMAL /HPF (ref 0–4)
RENAL EPITHELIAL, UA: NORMAL /HPF
SODIUM BLD-SCNC: 135 MMOL/L (ref 135–144)
SODIUM BLD-SCNC: 135 MMOL/L (ref 135–144)
SPECIFIC GRAVITY UA: 1.01 (ref 1–1.03)
TOTAL PROTEIN, URINE: 11 MG/DL
TOTAL PROTEIN: 6.9 G/DL (ref 6.4–8.3)
TOTAL PROTEIN: 6.9 G/DL (ref 6.4–8.3)
TRICHOMONAS: NORMAL
TURBIDITY: CLEAR
URINE HGB: ABNORMAL
URINE TOTAL PROTEIN CREATININE RATIO: 0.22 (ref 0–0.2)
UROBILINOGEN, URINE: NORMAL
VITAMIN D 25-HYDROXY: 49 NG/ML (ref 30–100)
WBC UA: NORMAL /HPF (ref 0–5)
YEAST: NORMAL

## 2021-07-30 ENCOUNTER — CARE COORDINATION (OUTPATIENT)
Dept: CARE COORDINATION | Age: 86
End: 2021-07-30

## 2021-07-30 SDOH — ECONOMIC STABILITY: FOOD INSECURITY: WITHIN THE PAST 12 MONTHS, YOU WORRIED THAT YOUR FOOD WOULD RUN OUT BEFORE YOU GOT MONEY TO BUY MORE.: NEVER TRUE

## 2021-07-30 SDOH — ECONOMIC STABILITY: FOOD INSECURITY: WITHIN THE PAST 12 MONTHS, THE FOOD YOU BOUGHT JUST DIDN'T LAST AND YOU DIDN'T HAVE MONEY TO GET MORE.: NEVER TRUE

## 2021-07-30 ASSESSMENT — SOCIAL DETERMINANTS OF HEALTH (SDOH): HOW HARD IS IT FOR YOU TO PAY FOR THE VERY BASICS LIKE FOOD, HOUSING, MEDICAL CARE, AND HEATING?: NOT VERY HARD

## 2021-07-30 NOTE — CARE COORDINATION
Ambulatory Care Coordination Note  7/30/2021  CM Risk Score: 2  Charlson 10 Year Mortality Risk Score: 100%     ACC: Rony Acosta, RN    Summary Note: spoke to patient who states he is doing pretty good. He states that he is trying to eat as much as he can and eating frequently. He denies having a weight gain, shortness of breath or swelling of lower legs from his baseline. He states that dietician called him and he is trying to eat more often. Patient went yesterday and labs done for neph - Dr. Rajeev Marcelino appt on 8/232021. His Bun 63, Cr 3.51 (5/7/21 3.78) , PTH - 139.5, BNP- 2,746. He is still having some trouble with constipation,states he has miralx but not taking daily. Encouraged pt to take it daily and can drink some prune juice and if no results to call on Monday. Patient inquired about the order for Lift Chair. Informed him the order had been sent to [facility] and CM would call them to inquire about. Informed him that he may have to pay for chair and then request reimbursement from [facility]. CM will call and inquire. CM on last call had referred him to  for assitance with Life alert system and ACP docs and he declined assistance from . He stated he has paperwork for POA and Living will and should be on file. CM does not see any ACP docs in chart. Encouraged him to call this nurse with concerns or questions. CM Plan:  - will outreach to [facility] to inquire about Lift Chair and follow up with patient next week.       Congestive Heart Failure Assessment    Are you currently restricting fluids?: 1800cc  Do you understand a low sodium diet?: Yes  Do you understand how to read food labels?: Yes  Do you salt your food before tasting it?: No     No patient-reported symptoms      Symptoms:  CHF associated fatigue: Pos      Symptom course: stable  Weight trend: stable  Salt intake watch compared to last visit: stable         Care Coordination Interventions    Program Enrollment: Complex Care  Referral from Primary Care Provider: No  Suggested Interventions and Community Resources  Fall Risk Prevention: Completed  Marketplace Navigator: Completed (Comment: [employer] referral for Compassinate Care, 14 day meals delivered.)  Meals on Wheels: Completed  Senior Services: Completed  Zone Management Tools: Completed (Comment: CHF zone tool)       Educated patient about risk for severe COVID-19 due to risk factors according to CDC guidelines. ACM reviewed discharge instructions, medical action plan and red flag symptoms with the patient who verbalized understanding. Discussed COVID vaccination status: Yes. Education provided on COVID-19 vaccination as appropriate. Discussed exposure protocols and quarantine with CDC Guidelines. Patient was given an opportunity to verbalize any questions and concerns and agrees to contact ACM or health care provider for questions related to their healthcare. Advance Care Planning  People with COVID-19 may have no symptoms, mild symptoms, such as fever, cough, and shortness of breath or they may have more severe illness, developing severe and fatal pneumonia. As a result, Advance Care Planning with attention to naming a health care decision maker (someone you trust to make healthcare decisions for you if you could not speak for yourself) and sharing other health care preferences is important BEFORE a possible health crisis. Please contact your Primary Care Provider to discuss Advance Care Planning.     Preventing the Spread of Coronavirus Disease 2019 in Homes and Residential Communities  For the most recent information go to [employer]    Prevention steps for People with confirmed or suspected COVID-19 (including persons under investigation) who do not need to be hospitalized  and   People with confirmed COVID-19 who were hospitalized and determined to be medically stable to go home    Your healthcare provider and public health staff will evaluate whether you can be cared for at home. If it is determined that you do not need to be hospitalized and can be isolated at home, you will be monitored by staff from your local or state health department. You should follow the prevention steps below until a healthcare provider or local or state health department says you can return to your normal activities. Stay home except to get medical care  People who are mildly ill with COVID-19 are able to isolate at home during their illness. You should restrict activities outside your home, except for getting medical care. Do not go to work, school, or public areas. Avoid using public transportation, ride-sharing, or taxis. Separate yourself from other people and animals in your home  People: As much as possible, you should stay in a specific room and away from other people in your home. Also, you should use a separate bathroom, if available. Animals: You should restrict contact with pets and other animals while you are sick with COVID-19, just like you would around other people. Although there have not been reports of pets or other animals becoming sick with COVID-19, it is still recommended that people sick with COVID-19 limit contact with animals until more information is known about the virus. When possible, have another member of your household care for your animals while you are sick. If you are sick with COVID-19, avoid contact with your pet, including petting, snuggling, being kissed or licked, and sharing food. If you must care for your pet or be around animals while you are sick, wash your hands before and after you interact with pets and wear a facemask. Call ahead before visiting your doctor  If you have a medical appointment, call the healthcare provider and tell them that you have or may have COVID-19.  This will help the healthcare providers office take steps to keep other people from getting infected or exposed. Wear a facemask  You should wear a facemask when you are around other people (e.g., sharing a room or vehicle) or pets and before you enter a healthcare providers office. If you are not able to wear a facemask (for example, because it causes trouble breathing), then people who live with you should not stay in the same room with you, or they should wear a facemask if they enter your room. Cover your coughs and sneezes  Cover your mouth and nose with a tissue when you cough or sneeze. Throw used tissues in a lined trash can. Immediately wash your hands with soap and water for at least 20 seconds or, if soap and water are not available, clean your hands with an alcohol-based hand  that contains at least 60% alcohol. Clean your hands often  Wash your hands often with soap and water for at least 20 seconds, especially after blowing your nose, coughing, or sneezing; going to the bathroom; and before eating or preparing food. If soap and water are not readily available, use an alcohol-based hand  with at least 60% alcohol, covering all surfaces of your hands and rubbing them together until they feel dry. Soap and water are the best option if hands are visibly dirty. Avoid touching your eyes, nose, and mouth with unwashed hands. Avoid sharing personal household items  You should not share dishes, drinking glasses, cups, eating utensils, towels, or bedding with other people or pets in your home. After using these items, they should be washed thoroughly with soap and water. Clean all high-touch surfaces everyday  High touch surfaces include counters, tabletops, doorknobs, bathroom fixtures, toilets, phones, keyboards, tablets, and bedside tables. Also, clean any surfaces that may have blood, stool, or body fluids on them. Use a household cleaning spray or wipe, according to the label instructions.  Labels contain instructions for safe and effective use of the cleaning product including precautions you should take when applying the product, such as wearing gloves and making sure you have good ventilation during use of the product. Monitor your symptoms  Seek prompt medical attention if your illness is worsening (e.g., difficulty breathing). Before seeking care, call your healthcare provider and tell them that you have, or are being evaluated for, COVID-19. Put on a facemask before you enter the facility. These steps will help the healthcare providers office to keep other people in the office or waiting room from getting infected or exposed. Ask your healthcare provider to call the local or state health department. Persons who are placed under active monitoring or facilitated self-monitoring should follow instructions provided by their local health department or occupational health professionals, as appropriate. When working with your local health department check their available hours. If you have a medical emergency and need to call 911, notify the dispatch personnel that you have, or are being evaluated for COVID-19. If possible, put on a facemask before emergency medical services arrive. Discontinuing home isolation  Patients with confirmed COVID-19 should remain under home isolation precautions until the risk of secondary transmission to others is thought to be low. The decision to discontinue home isolation precautions should be made on a case-by-case basis, in consultation with healthcare providers and [facility] and local health departments. Goals Addressed                 This Visit's Progress     Conditions and Symptoms   On track     I will schedule office visits, as directed by my provider. I will keep my appointment or reschedule if I have to cancel. I will notify my provider of any barriers to my plan of care. I will follow my Zone Management tool to seek urgent or emergent care. I will notify my provider of any symptoms that indicate a worsening of my condition.     Barriers: fear of failure, overwhelmed by complexity of regimen, and lack of education  Plan for overcoming my barriers: will work with Care Manager  Confidence: 8/10  Anticipated Goal Completion Date: 8/1/2021         Nutrition Plan   Improving     I will work on eating small frequent meals- 6 small meals/day, choose high calorie/protein foods  and use Ensure twice daily between meals    Barriers: lack of education  Plan for overcoming my barriers: CC dietitian to educate on eating small more frequent meals, choosing high calorie/protein foods and using Ensure daily  Confidence: 6/10  Anticipated Goal Completion Date: 10/20/21            Prior to Admission medications    Medication Sig Start Date End Date Taking? Authorizing Provider   Lift Chair MISC by Does not apply route  Patient not taking: Reported on 7/19/2021 6/28/21   Mushtaq Lynch MD   spironolactone (ALDACTONE) 25 MG tablet Take 1 tablet by mouth daily 4/29/21   Historical Provider, MD   bisoprolol (ZEBETA) 5 MG tablet Take 5 mg by mouth daily    Historical Provider, MD   fluticasone-vilanterol (BREO ELLIPTA) 100-25 MCG/INH AEPB inhaler Inhale 2 puffs into the lungs daily    Historical Provider, MD   polyethylene glycol (MIRALAX) 17 g PACK packet Take 17 g by mouth daily 4/8/21   Historical Provider, MD   Iron-Vitamins (GERITOL TONIC PO) Take by mouth daily Take 1 tablespoon by mouth daily.     Historical Provider, MD   bumetanide (BUMEX) 2 MG tablet take 1 tablet by mouth twice a day 3/18/21   Historical Provider, MD   docusate sodium (COLACE) 100 MG capsule Take 1 capsule by mouth daily as needed for Constipation 3/4/21   Mushtaq Lynch MD   apixaban (ELIQUIS) 2.5 MG TABS tablet Take 1 tablet by mouth 2 times daily 1/3/19   Mushtaq Lynch MD   famotidine (PEPCID) 20 MG tablet Take 1 tablet by mouth daily 1/3/19   Mushtaq Lynch MD   rosuvastatin (CRESTOR) 40 MG tablet Take 1 tablet by mouth every evening 12/20/18   Mushtaq Lynch MD aspirin 81 MG tablet Take 1 tablet by mouth daily  Patient taking differently: Take 81 mg by mouth three times a week  12/20/18   Fei Ca MD       Future Appointments   Date Time Provider Oscar Deras   9/13/2021 10:45 AM Fei Ca MD [facility]   9/24/2021  1:00 PM [facility]

## 2021-08-02 ENCOUNTER — CARE COORDINATION (OUTPATIENT)
Dept: CARE COORDINATION | Age: 86
End: 2021-08-02

## 2021-08-02 DIAGNOSIS — R63.0 LOSS OF APPETITE: Primary | ICD-10-CM

## 2021-08-02 DIAGNOSIS — R63.4 WEIGHT LOSS: ICD-10-CM

## 2021-08-02 NOTE — CARE COORDINATION
Nutrition Care Coordinator Follow-Up visit:    Food Recall: eating 2 meals/d    Activity Level:  Sedentary: X  Lightly Active: Moderately Active:  Very Active:    Adult BMI:  Underweight (below 18.5)  Normal Weight (18.5-24. 9) X  Overweight (25-29. 9)  Obese (30-39. 9)  Morbidly Obese (>40)    Weight Change:  40# weight loss over the past 8 months    Plan:  Plan was established with patient:  Small frequent meals- 6 small meals/d: X  Increase protein intake: X  Ensure Plus twice daily: X    Monitoring: Will monitor weight: X  Will monitor adherence to meal plan: Will monitor adherence to exercise plan: Will monitor HGA1c:    Handouts Provided :  High calorie/protei food list: X  Ensure coupons: X    Goals:  Patient goals set:  1. Reviewedd nutritional supplement, he is now drinking Ensure Plus once daily. Encouraged to increase intake of  Ensure Plus to twice daily between meals. 2. Reviewed eating small frequent meals/snacks.  Patient is only eating 2-3 times a day. Encouraged to increase to small frequent meals every 2-3 hours, goal is 6-8 small meals/day. 3. Reviewed high calorie/protein foods. Reviewed list of high calorie/protein foods and snacks. Encouraged to include at least 1 food item off list at each small meal daily.    Patient relays he got nutrition information and did find it helpful, trying to eat more often and choose foods off lists provided to him to increase his protein and calorie intake. Discussed seeing if he can get Ensure through his insurance and spoke with Giovanni Maynard who relays she did not think he could. Called [employer] and they will check on his coverage. Message also to PCP requesting script for Ensure Plus 2 cans/day.   Karen Jacobson

## 2021-08-02 NOTE — CARE COORDINATION
Spoke with Wily Rosas RD,  from [employer] to see if patient can   Get Ensure Plus covered. She will look into coverage for patient.

## 2021-08-03 ENCOUNTER — CARE COORDINATION (OUTPATIENT)
Dept: CARE COORDINATION | Age: 86
End: 2021-08-03

## 2021-08-03 RX ORDER — MEDICAL SUPPLY, MISCELLANEOUS
1 EACH MISCELLANEOUS 2 TIMES DAILY
Qty: 60 CAN | Refills: 3 | Status: SHIPPED | OUTPATIENT
Start: 2021-08-03 | End: 2021-10-11 | Stop reason: SDUPTHER

## 2021-08-03 NOTE — CARE COORDINATION
Ambulatory Care Coordination Note  8/3/2021  CM Risk Score: 2  Charlson 10 Year Mortality Risk Score: 100%     ACC: Magalys Cedillo RN    Summary Note: spoke to patient and informed him that he will be getting Ensure nutritional drinks delivered to him and that his [employer] will be covering the cost. He states he went to the office and picked up some samples and coupons and he used them already. He states he is very appreciative of all the help. ACM will f/u with [facility] Jerel Price regarding his Lift chair and inform pt of the progress. Care Coordination Interventions    Program Enrollment: Complex Care  Referral from Primary Care Provider: No  Suggested Interventions and Community Resources  Fall Risk Prevention: Completed  Marketplace Navigator: Completed (Comment: [employer] referral for Comp[employer] Care, 14 day meals delivered.)  Meals on Wheels: Completed  Senior Services: Completed  Zone Management Tools: Completed (Comment: CHF zone tool)         Goals Addressed                 This Visit's Progress     Conditions and Symptoms   On track     I will schedule office visits, as directed by my provider. I will keep my appointment or reschedule if I have to cancel. I will notify my provider of any barriers to my plan of care. I will follow my Zone Management tool to seek urgent or emergent care. I will notify my provider of any symptoms that indicate a worsening of my condition.     Barriers: fear of failure, overwhelmed by complexity of regimen, and lack of education  Plan for overcoming my barriers: will work with Care Manager  Confidence: 8/10  Anticipated Goal Completion Date: 8/1/2021         Nutrition Plan   Improving     I will work on eating small frequent meals- 6 small meals/day, choose high calorie/protein foods  and use Ensure twice daily between meals    Barriers: lack of education  Plan for overcoming my barriers: CC dietitian to educate on eating small more frequent meals, choosing high calorie/protein foods and using Ensure daily  Confidence: 6/10  Anticipated Goal Completion Date: 10/20/21            Prior to Admission medications    Medication Sig Start Date End Date Taking? Authorizing Provider   Nutritional Supplements (NUTRITIONAL DRINK) LIQD Take 1 each by mouth 2 times daily 8/3/21   Ajay Jeffers MD   Lift Chair MISC by Does not apply route  Patient not taking: Reported on 7/19/2021 6/28/21   Ajay Jeffers MD   spironolactone (ALDACTONE) 25 MG tablet Take 1 tablet by mouth daily 4/29/21   Historical Provider, MD   bisoprolol (ZEBETA) 5 MG tablet Take 5 mg by mouth daily    Historical Provider, MD   fluticasone-vilanterol (BREO ELLIPTA) 100-25 MCG/INH AEPB inhaler Inhale 2 puffs into the lungs daily    Historical Provider, MD   polyethylene glycol (MIRALAX) 17 g PACK packet Take 17 g by mouth daily 4/8/21   Historical Provider, MD   Iron-Vitamins (GERITOL TONIC PO) Take by mouth daily Take 1 tablespoon by mouth daily.     Historical Provider, MD   bumetanide (BUMEX) 2 MG tablet take 1 tablet by mouth twice a day 3/18/21   Historical Provider, MD   docusate sodium (COLACE) 100 MG capsule Take 1 capsule by mouth daily as needed for Constipation 3/4/21   Ajay Jeffers MD   apixaban (ELIQUIS) 2.5 MG TABS tablet Take 1 tablet by mouth 2 times daily 1/3/19   Ajay Jeffers MD   famotidine (PEPCID) 20 MG tablet Take 1 tablet by mouth daily 1/3/19   Ajay Jeffers MD   rosuvastatin (CRESTOR) 40 MG tablet Take 1 tablet by mouth every evening 12/20/18   Ajay Jeffers MD   aspirin 81 MG tablet Take 1 tablet by mouth daily  Patient taking differently: Take 81 mg by mouth three times a week  12/20/18   Ajay Jeffers MD       Future Appointments   Date Time Provider Oscar Deras   9/13/2021 10:45 AM Ajay Jeffers MD [facility]   9/24/2021  1:00 PM MD henrique Mariak [facility]

## 2021-08-04 ENCOUNTER — CARE COORDINATION (OUTPATIENT)
Dept: CARE COORDINATION | Age: 86
End: 2021-08-04

## 2021-08-04 NOTE — CARE COORDINATION
Incoming call from Diana Brown RD letting me know patient was approved through his insurance to have 2 cans of Ensure Plus/day delivered to his home. Radha Valero for her help with patient. Message [facility] to let her know patient was approved.   JUSTINE Villa

## 2021-08-19 ENCOUNTER — CARE COORDINATION (OUTPATIENT)
Dept: CARE COORDINATION | Age: 86
End: 2021-08-19

## 2021-08-19 NOTE — CARE COORDINATION
Call to [facility] requesting information on how  Patient re-orders Ensure Plus when he gets low. Suresh Howard  Provided phone number to call-  [phone] choose option#2  Call back to patient to provide patient with above   Phone number to call when Ensure gets low.   JUSTINE Villa

## 2021-08-19 NOTE — CARE COORDINATION
Nutrition Care Coordinator Follow-Up visit:    Food Recall: eating 2 meals/d    Activity Level:  Sedentary:  Lightly Active: X  Moderately Active:  Very Active:    Adult BMI:  Underweight (below 18.5)  Normal Weight (18.5-24. 9) X  Overweight (25-29. 9)  Obese (30-39. 9)  Morbidly Obese (>40)    Weight Change: no change    Plan:  Plan was established with patient:  Small frequent meals, 6 small meals/day:X  Ensure Plus twice daily: X    Monitoring: Will monitor weight: X  Will monitor adherence to meal plan: Will monitor adherence to exercise plan: Will monitor HGA1c:    Handouts Provided :  High calorie/protein foods: X    Goals:  Patient goals set:  1. Reviewed nutritional supplement, he is drinking Ensure Plus now twice daily, getting from [place] through his insurance. 2. Reviewed eating small frequent meals/snacks.  Patient is only eating 2-3 times a day. Encouraged to increase to small frequent meals every 2-3 hours, goal is 6-8 small meals/day. 3. Reviewed high calorie/protein foods. Reviewed list of high calorie/protein foods and snacks. Encouraged to include at least 1 food item off list at each small meal daily.    Patient relays he is doing well, now drinking Ensure Plus twice a day, thankful he is getting it through his insurance now and does not have to pay for it. Reviewed high calorie/protein foods to add to meals/snacks and also encouraged to increase the amount of times he is eating daily. Will follow up in 3-4 weeks to review and answer questions.     Carissa Garay

## 2021-09-08 ENCOUNTER — CARE COORDINATION (OUTPATIENT)
Dept: CARE COORDINATION | Age: 86
End: 2021-09-08

## 2021-09-08 NOTE — CARE COORDINATION
Ambulatory Care Coordination Note  9/8/2021  CM Risk Score: 2  Charlson 10 Year Mortality Risk Score: 100%     ACC: Manoj Hein, RN    Summary Note: Call from patient requesting to know when he has appointment with GI, CM informed him he scheduled to see Dr. Dawn Corral on 9/24/2021 at 1:00p. Also, reminded him of office appt on 9/13/2021 at 10:45, he states he had on his calendar for 3pm. He states he wrote it on his calendar. He states he has been doing fine. He states he saw the heart doctor- Dr Rossi Spangler and he made no changes. He said Dr. Rossi Spagnler was sending a note to kidney dr regarding taking the Eliquis. States he see Dr. Bhavin Tenorio in couple months. He has not heard from [facility] regarding his lift chair. CM will call and inquire. Pt states the dietician called him and gave him a number for him to call when his Ensure gets low. States he has plenty now. He denies having a weight gain or swelling of lower legs or feet. He continues to take bumex as prescribed. CM Plan:  - will update Dr. Jackie Melo and follow up with pt in 1-2 weeks.    - will follow up with [facility] for lift chair      Congestive Heart Failure Assessment    Are you currently restricting fluids?: 1800cc  Do you understand a low sodium diet?: Yes  Do you understand how to read food labels?: Yes  Do you salt your food before tasting it?: No     No patient-reported symptoms      Symptoms:  None: Yes      Symptom course: stable  Weight trend: stable  Salt intake watch compared to last visit: stable         Care Coordination Interventions    Program Enrollment: Complex Care  Referral from Primary Care Provider: No  Suggested Interventions and Community Resources  Fall Risk Prevention: Completed  Marketplace Navigator: Completed (Comment: [employer] referral for [employer], 14 day meals delivered.)  Meals on Wheels: Completed  Senior Services: Completed  Zone Management Tools: Completed (Comment: CHF zone tool)     Educated patient about risk for severe COVID-19 due to risk factors according to CDC guidelines. ACM reviewed discharge instructions, medical action plan and red flag symptoms with the patient who verbalized understanding. Discussed COVID vaccination status: Yes. Education provided on COVID-19 vaccination as appropriate. Discussed exposure protocols and quarantine with CDC Guidelines. Patient was given an opportunity to verbalize any questions and concerns and agrees to contact ACM or health care provider for questions related to their healthcare. Advance Care Planning  People with COVID-19 may have no symptoms, mild symptoms, such as fever, cough, and shortness of breath or they may have more severe illness, developing severe and fatal pneumonia. As a result, Advance Care Planning with attention to naming a health care decision maker (someone you trust to make healthcare decisions for you if you could not speak for yourself) and sharing other health care preferences is important BEFORE a possible health crisis. Please contact your Primary Care Provider to discuss Advance Care Planning. Preventing the Spread of Coronavirus Disease 2019 in Homes and Residential Communities  For the most recent information go to [email or web address]    Prevention steps for People with confirmed or suspected COVID-19 (including persons under investigation) who do not need to be hospitalized  and   People with confirmed COVID-19 who were hospitalized and determined to be medically stable to go home    Your healthcare provider and public health staff will evaluate whether you can be cared for at home. If it is determined that you do not need to be hospitalized and can be isolated at home, you will be monitored by staff from your local or state health department.  You should follow the prevention steps below until a healthcare provider or local or state health department says you can return to your normal activities. Stay home except to get medical care  People who are mildly ill with COVID-19 are able to isolate at home during their illness. You should restrict activities outside your home, except for getting medical care. Do not go to work, school, or public areas. Avoid using public transportation, ride-sharing, or taxis. Separate yourself from other people and animals in your home  People: As much as possible, you should stay in a specific room and away from other people in your home. Also, you should use a separate bathroom, if available. Animals: You should restrict contact with pets and other animals while you are sick with COVID-19, just like you would around other people. Although there have not been reports of pets or other animals becoming sick with COVID-19, it is still recommended that people sick with COVID-19 limit contact with animals until more information is known about the virus. When possible, have another member of your household care for your animals while you are sick. If you are sick with COVID-19, avoid contact with your pet, including petting, snuggling, being kissed or licked, and sharing food. If you must care for your pet or be around animals while you are sick, wash your hands before and after you interact with pets and wear a facemask. Call ahead before visiting your doctor  If you have a medical appointment, call the healthcare provider and tell them that you have or may have COVID-19. This will help the healthcare providers office take steps to keep other people from getting infected or exposed. Wear a facemask  You should wear a facemask when you are around other people (e.g., sharing a room or vehicle) or pets and before you enter a healthcare providers office.  If you are not able to wear a facemask (for example, because it causes trouble breathing), then people who live with you should not stay in the same room with you, or they should wear a facemask if they enter your room. Cover your coughs and sneezes  Cover your mouth and nose with a tissue when you cough or sneeze. Throw used tissues in a lined trash can. Immediately wash your hands with soap and water for at least 20 seconds or, if soap and water are not available, clean your hands with an alcohol-based hand  that contains at least 60% alcohol. Clean your hands often  Wash your hands often with soap and water for at least 20 seconds, especially after blowing your nose, coughing, or sneezing; going to the bathroom; and before eating or preparing food. If soap and water are not readily available, use an alcohol-based hand  with at least 60% alcohol, covering all surfaces of your hands and rubbing them together until they feel dry. Soap and water are the best option if hands are visibly dirty. Avoid touching your eyes, nose, and mouth with unwashed hands. Avoid sharing personal household items  You should not share dishes, drinking glasses, cups, eating utensils, towels, or bedding with other people or pets in your home. After using these items, they should be washed thoroughly with soap and water. Clean all high-touch surfaces everyday  High touch surfaces include counters, tabletops, doorknobs, bathroom fixtures, toilets, phones, keyboards, tablets, and bedside tables. Also, clean any surfaces that may have blood, stool, or body fluids on them. Use a household cleaning spray or wipe, according to the label instructions. Labels contain instructions for safe and effective use of the cleaning product including precautions you should take when applying the product, such as wearing gloves and making sure you have good ventilation during use of the product. Monitor your symptoms  Seek prompt medical attention if your illness is worsening (e.g., difficulty breathing).  Before seeking care, call your healthcare provider and tell them that you have, or are being evaluated for, COVID-19. Put on a facemask before you enter the facility. These steps will help the healthcare providers office to keep other people in the office or waiting room from getting infected or exposed. Ask your healthcare provider to call the local or state health department. Persons who are placed under active monitoring or facilitated self-monitoring should follow instructions provided by their local health department or occupational health professionals, as appropriate. When working with your local health department check their available hours. If you have a medical emergency and need to call 911, notify the dispatch personnel that you have, or are being evaluated for COVID-19. If possible, put on a facemask before emergency medical services arrive. Discontinuing home isolation  Patients with confirmed COVID-19 should remain under home isolation precautions until the risk of secondary transmission to others is thought to be low. The decision to discontinue home isolation precautions should be made on a case-by-case basis, in consultation with healthcare providers and [facility] and [facility] health departments. Goals Addressed                 This Visit's Progress     Conditions and Symptoms   On track     I will schedule office visits, as directed by my provider. I will keep my appointment or reschedule if I have to cancel. I will notify my provider of any barriers to my plan of care. I will follow my Zone Management tool to seek urgent or emergent care. I will notify my provider of any symptoms that indicate a worsening of my condition.     Barriers: fear of failure, overwhelmed by complexity of regimen, and lack of education  Plan for overcoming my barriers: will work with Care Manager  Confidence: 8/10  Anticipated Goal Completion Date: 8/1/2021         Nutrition Plan   Improving     I will work on eating small frequent meals- 6 small meals/day, choose high calorie/protein foods  and use Ensure twice daily between meals    Barriers: lack of education  Plan for overcoming my barriers: CC dietitian to educate on eating small more frequent meals, choosing high calorie/protein foods and using Ensure daily  Confidence: 6/10  Anticipated Goal Completion Date: 10/20/21            Prior to Admission medications    Medication Sig Start Date End Date Taking? Authorizing Provider   Nutritional Supplements (NUTRITIONAL DRINK) LIQD Take 1 each by mouth 2 times daily 8/3/21   Shannon Maza, MD   Lift Chair MISC by Does not apply route  Patient not taking: Reported on 7/19/2021 6/28/21   Shannon Maza, MD   spironolactone (ALDACTONE) 25 MG tablet Take 1 tablet by mouth daily 4/29/21   Historical Provider, MD   bisoprolol (ZEBETA) 5 MG tablet Take 5 mg by mouth daily    Historical Provider, MD   fluticasone-vilanterol (BREO ELLIPTA) 100-25 MCG/INH AEPB inhaler Inhale 2 puffs into the lungs daily    Historical Provider, MD   polyethylene glycol (MIRALAX) 17 g PACK packet Take 17 g by mouth daily 4/8/21   Historical Provider, MD   Iron-Vitamins (GERITOL TONIC PO) Take by mouth daily Take 1 tablespoon by mouth daily.     Historical Provider, MD   bumetanide (BUMEX) 2 MG tablet take 1 tablet by mouth twice a day 3/18/21   Historical Provider, MD   docusate sodium (COLACE) 100 MG capsule Take 1 capsule by mouth daily as needed for Constipation 3/4/21   Shannon Maza, MD   apixaban (ELIQUIS) 2.5 MG TABS tablet Take 1 tablet by mouth 2 times daily 1/3/19   Shannon Maza, MD   famotidine (PEPCID) 20 MG tablet Take 1 tablet by mouth daily 1/3/19   Shannon Maza, MD   rosuvastatin (CRESTOR) 40 MG tablet Take 1 tablet by mouth every evening 12/20/18   Shannon Maza, MD   aspirin 81 MG tablet Take 1 tablet by mouth daily  Patient taking differently: Take 81 mg by mouth three times a week  12/20/18   Shannon Maza MD       Future Appointments   Date Time Provider Oscar Deras   9/13/2021 10:45 AM Reggie Cor[facility]   9/24/2021  1:00 PM Jenny Galloway MD [facility]       Patient Care Team:  Jerome Castillo MD as PCP - General  Jerome Castillo MD as PCP - REHABILITATION [facility]  Ephraim Miles MD as Consulting Physician (Cardiology)  Sharona Jackson MD as Consulting Physician (Nephrology)  Carmella Galloway RN as [address] [place] [address]  Jenny Galloway MD as Consulting Physician (Gastroenterology)  Lance Arshad RD, LD as Dietitian

## 2021-09-08 NOTE — CARE COORDINATION
Call to [facility] and spoke to Chelsea Mesa regarding order for Venu Ng and she states that he doesn't qualify by insurance for chair per CMN and notes. He may be able to pay for chair and then insurance reimburse him. Patient does not qualify for Passport services. Chelsea Mesa will contact pt to see about him coming into the showroom to see the chairs and what cost is. ACM will follow up with patient on his decision for chair and offer other suggestions to raise his current chair to ease difficulty in getting up from sitting position. Also, send a not to Jovani Burrell. LUIS to inquire about process of reimbursement for chair from [employer].

## 2021-09-13 ENCOUNTER — OFFICE VISIT (OUTPATIENT)
Dept: INTERNAL MEDICINE CLINIC | Age: 86
End: 2021-09-13
Payer: MEDICARE

## 2021-09-13 VITALS
TEMPERATURE: 97 F | BODY MASS INDEX: 22.42 KG/M2 | HEIGHT: 69 IN | SYSTOLIC BLOOD PRESSURE: 106 MMHG | RESPIRATION RATE: 16 BRPM | WEIGHT: 151.4 LBS | DIASTOLIC BLOOD PRESSURE: 60 MMHG | HEART RATE: 76 BPM

## 2021-09-13 DIAGNOSIS — Z91.81 AT HIGH RISK FOR FALLS: ICD-10-CM

## 2021-09-13 DIAGNOSIS — Z23 NEED FOR INFLUENZA VACCINATION: ICD-10-CM

## 2021-09-13 DIAGNOSIS — G89.29 CHRONIC PAIN OF RIGHT ANKLE: ICD-10-CM

## 2021-09-13 DIAGNOSIS — M79.674 GREAT TOE PAIN, RIGHT: ICD-10-CM

## 2021-09-13 DIAGNOSIS — N18.4 CHRONIC KIDNEY DISEASE, STAGE IV (SEVERE) (HCC): ICD-10-CM

## 2021-09-13 DIAGNOSIS — M25.571 CHRONIC PAIN OF RIGHT ANKLE: ICD-10-CM

## 2021-09-13 DIAGNOSIS — M16.11 PRIMARY OSTEOARTHRITIS OF RIGHT HIP: ICD-10-CM

## 2021-09-13 DIAGNOSIS — I10 ESSENTIAL HYPERTENSION: Primary | ICD-10-CM

## 2021-09-13 PROCEDURE — 90694 VACC AIIV4 NO PRSRV 0.5ML IM: CPT | Performed by: INTERNAL MEDICINE

## 2021-09-13 PROCEDURE — G0008 ADMIN INFLUENZA VIRUS VAC: HCPCS | Performed by: INTERNAL MEDICINE

## 2021-09-13 PROCEDURE — 99214 OFFICE O/P EST MOD 30 MIN: CPT | Performed by: INTERNAL MEDICINE

## 2021-09-13 NOTE — PROGRESS NOTES
Parviz Johnson is a 80 y.o. male who presents for   Chief Complaint   Patient presents with    Hypertension     6 mo check up, labs 7/21 in EPIC    Hyperlipidemia     as aboce    Other     co ankle cramping at night- Dr Marek Rievra put him on spironolactone but he stopped as he was getting up 3-4 times a  night to urinate    Anorexia     still bothersome but not as severe- up 4 pounds    Immunizations     did get COVID vaccine, wants flu shot today    and follow up of chronic medical problems.   Patient Active Problem List   Diagnosis    Sleep apnea    Pro[facility] cancer ([facility] [address]    CRF (chronic renal failure)    OA (osteoarthritis) of knee    Hypercholesteremia    Impotence    HTN (hypertension)    Closed fracture of shaft of metacarpal bone    Primary osteoarthritis of right hip    Peripheral vascular disease (HCC)    Chronic kidney disease, stage IV (severe) (HCC)     HPI  Here for follow-up on blood pressure and kidney issues and patient states he is doing better with the appetite and nausea and eating better and weight remains stable in fact he gained 1 pound according to our chart and patient complains of right toe pain and right ankle pain has been still having problems    Current Outpatient Medications   Medication Sig Dispense Refill    Nutritional Supplements (NUTRITIONAL DRINK) LIQD Take 1 each by mouth 2 times daily 60 Can 3    bisoprolol (ZEBETA) 5 MG tablet Take 5 mg by mouth daily      polyethylene glycol (MIRALAX) 17 g PACK packet Take 17 g by mouth daily      bumetanide (BUMEX) 2 MG tablet take 1 tablet by mouth twice a day      docusate sodium (COLACE) 100 MG capsule Take 1 capsule by mouth daily as needed for Constipation 30 capsule 1    apixaban (ELIQUIS) 2.5 MG TABS tablet Take 1 tablet by mouth 2 times daily 7 tablet 0    famotidine (PEPCID) 20 MG tablet Take 1 tablet by mouth daily 7 tablet 0    rosuvastatin (CRESTOR) 40 MG tablet Take 1 tablet by mouth every evening 90 tablet 3  aspirin 81 MG tablet Take 1 tablet by mouth daily (Patient taking differently: Take 81 mg by mouth three times a week ) 30 tablet 2    Lift Chair MISC by Does not apply route (Patient not taking: Reported on 7/19/2021) 1 each 0    spironolactone (ALDACTONE) 25 MG tablet Take 1 tablet by mouth daily (Patient not taking: Reported on 9/13/2021)       No current facility-administered medications for this visit.        Allergies   Allergen Reactions    Ace Inhibitors     Other      Beta-blockers slow heart rate    Timolol      Eye drops slow heart rate       Past Medical History:   Diagnosis Date    BPH (benign prostatic hyperplasia)     secondary to reynolds    CAD (coronary artery disease) 1/18/2005    CRF (chronic renal failure) 01/01    patient denies kidney problems at pre-testing 2015    DVD (dissociated vertical deviation)     Gout     H/O cardiac catheterization 1/18/05    stents     HTN (hypertension)     Hx of blood clots     left leg (over 23 yrs ago)    Hypercholesteremia     Impotence     Irregular heartbeat     Nodular prostate     -bx Dr. Adriana Ram 8/96 and focal inflammation 6/97    OA (osteoarthritis) of knee     Prostate cancer ([facility] [address] 1/06    Unspecified sleep apnea     no machine       Past Surgical History:   Procedure Laterality Date    CORONARY ANGIOPLASTY      CYSTOPLASTY N/A     urethral dilation 10/93 Dr Shu Ramachandran Left     cataract    HAND SURGERY Right 1/08/15    Dr. Tristan Tierney did right ring and little finger metacarpophalangeal joint extension    JOINT REPLACEMENT      PROSTATE SURGERY      seed implant    REVISION TOTAL HIP ARTHROPLASTY Left 05/07    Dr Sandra Juárez Right 10/20/2015       Family History   Problem Relation Age of Onset    Coronary Art Dis Mother     Hypertension Mother     Cancer Brother      ROS   Constitutional:  Negative for fatigue, loss of appetite and unexpected weight change   HEENT            : Negative for neck stiffness and pain, no congestion or sinus pressure   Eyes                : No visual disturbance or pain   Cardiovascular: No chest pain or palpitations or leg swelling   Respiratory      : Negative for cough, shortness of breath or wheezing   Gastrointestinal: Negative for abdominal pain, constipation or diarrhea and bloating No nausea or vomiting   Genitourinary:     No urgency or frequency, no burning or hematuria   Musculoskeletal: No arthralgias, back pain or myalgias   Skin                  : Negative for rash or erythema   Neurological    : Negative for dizziness, weakness, tremors ,light headedness or syncope   Psychiatric       : Negative for dysphoric mood, sleep disturbances, nervous or anxious, or decreased concentration   All other review of systems was negative    Objective  Physical Examination:    Nursing note reviewed    /60 (Site: Left Upper Arm, Position: Sitting, Cuff Size: Medium Adult)   Pulse 76   Temp 97 °F (36.1 °C) (Infrared)   Resp 16   Ht 5' 9\" (1.753 m)   Wt 151 lb 6.4 oz (68.7 kg)   BMI 22.36 kg/m²   BP Readings from Last 3 Encounters:   09/13/21 106/60   06/04/21 (!) 96/57   05/13/21 (!) 90/56         Constitutional:  Raymundo Long is oriented to place, person and time ,appears well-developed and well-nourished  HEENT:  Atraumatic and normocephalic, external ears normal bilaterally, nose normal no oropharyngeal exudate and is clear and moist  Eyes:  EOCM normal; conjunctivae normal; PERRLA bilaterally  Neck:  Normal range of motion, neck supple, no JVD and no thyromegaly  Cardiovascular:  RRR, normal heart sounds and intact distal pulses  Pulmonary:  effort normal and breath sounds normal bilaterally,no wheezes or rales, no respiratory distress  Abdominal:  Soft, non-tender; normal bowel sounds, no masses  Musculoskeletal:  Normal range of motion and no edema or tenderness bilaterally  No lymphadenopathy  Neurological:  alert, oriented, and normal reflexes bilaterally  Skin: warm and dry  Psychiatric:  normal mood and effect; behavior normal.    Labs:   Lab Results   Component Value Date    LABA1C 5.8 02/10/2021     Lab Results   Component Value Date    CHOL 149 11/06/2020     Lab Results   Component Value Date    HDL 58 04/28/2021     Lab Results   Component Value Date    LDLCALC 64 05/16/2016     Lab Results   Component Value Date    TRIG 32 11/06/2020     No components found for: [place], [place]  Lab Results   Component Value Date    WBC 4.9 04/28/2021    HGB 13.1 07/29/2021    HCT 42.7 07/29/2021    MCV 85.7 04/28/2021     04/28/2021     Lab Results   Component Value Date    INR 1.2 05/12/2020    PROTIME 12.1 (H) 05/12/2020     Lab Results   Component Value Date    GLUCOSE 97 07/29/2021    GLUCOSE 97 07/29/2021    CREATININE 3.51 (H) 07/29/2021    CREATININE 3.51 (H) 07/29/2021    BUN 63 (H) 07/29/2021    BUN 63 (H) 07/29/2021     07/29/2021     07/29/2021    K 4.2 07/29/2021    K 4.2 07/29/2021    CL 96 (L) 07/29/2021    CL 96 (L) 07/29/2021    CO2 29 07/29/2021    CO2 29 07/29/2021     Lab Results   Component Value Date    ALT 15 07/29/2021    ALT 15 07/29/2021    AST 32 07/29/2021    AST 32 07/29/2021    ALKPHOS 87 07/29/2021    ALKPHOS 87 07/29/2021    BILITOT 0.63 07/29/2021    BILITOT 0.63 07/29/2021     Lab Results   Component Value Date    LABPROT 5.3 (L) 11/18/2012    LABALBU 3.9 07/29/2021    LABALBU 3.9 07/29/2021     Lab Results   Component Value Date    TSH 7.22 (H) 04/28/2021     Assessment:  1. Essential hypertension    2. Primary osteoarthritis of right hip    3. Chronic kidney disease, stage IV (severe) ([address]    4. Great toe pain, right    5.  Chronic pain of right ankle        Plan:  Blood pressure is stable on current medications  Advised patient to do stretching exercise before he goes to bed for the ankle pain and also take Tylenol for the toe pain and ankle pain and call me back in 2 to 3 weeks  Patient's visit to the cardiologist and office notes reviewed and patient was started on Aldactone but patient not taking the medication because he has to get up to go to the bathroom 3 or 4 times at nighttime and I did advise patient to discuss with his and nephrologist and will get a copy of the nephrology report and decide about restarting the Aldactone and currently patient is euvolemic and feeling better and last creatinine was 3.15  Patient's arthritis and hip pain are stable  Flu vaccine given  Review in 6 months           1. Josefina Feldman received counseling on the following healthy behaviors: nutrition and exercise     2. Prior labs and health maintenance reviewed. 3.  Discussed use, benefit, and side effects of prescribed medications. Barriers to medication compliance addressed. All his questions were answered. Pt voiced understanding. Josefina Feldman will continue current medications, diet and exercise. No orders of the defined types were placed in this encounter. Completed Refills               Requested Prescriptions      No prescriptions requested or ordered in this encounter     4. Patient given educational materials - see patient instructions    5. Was a self-tracking handout given in paper form or via [employer]? NO    No orders of the defined types were placed in this encounter. Return in about 6 months (around 3/13/2022). Patient voiced understanding and agreed to treatment plan. Electronically signed by [place] MD on 9/13/2021 at 11:31 AM    This note is created with a voice recognition program and while intend to generate a document that accurately reflects the content of the visit, no guarantee can be provided that every mistake has been identified and corrected by editing. On the basis of positive falls risk screening, assessment and plan is as follows: home safety tips provided.

## 2021-09-16 ENCOUNTER — HOSPITAL ENCOUNTER (OUTPATIENT)
Age: 86
Setting detail: SPECIMEN
Discharge: HOME | End: 2021-09-16
Payer: MEDICARE

## 2021-09-16 LAB
ANION GAP SERPL CALCULATED.3IONS-SCNC: 15 MMOL/L (ref 9–17)
BUN BLDV-MCNC: 57 MG/DL (ref 8–23)
BUN/CREAT BLD: ABNORMAL (ref 9–20)
CALCIUM SERPL-MCNC: 10 MG/DL (ref 8.6–10.4)
CHLORIDE BLD-SCNC: 98 MMOL/L (ref 98–107)
CO2: 27 MMOL/L (ref 20–31)
CREAT SERPL-MCNC: 3.2 MG/DL (ref 0.7–1.2)
GFR AFRICAN AMERICAN: 22 ML/MIN
GFR NON-AFRICAN AMERICAN: 18 ML/MIN
GFR SERPL CREATININE-BSD FRML MDRD: ABNORMAL ML/MIN/{1.73_M2}
GFR SERPL CREATININE-BSD FRML MDRD: ABNORMAL ML/MIN/{1.73_M2}
GLUCOSE BLD-MCNC: 86 MG/DL (ref 70–99)
POTASSIUM SERPL-SCNC: 4 MMOL/L (ref 3.7–5.3)
SODIUM BLD-SCNC: 140 MMOL/L (ref 135–144)

## 2021-09-24 ENCOUNTER — OFFICE VISIT (OUTPATIENT)
Dept: GASTROENTEROLOGY | Age: 86
End: 2021-09-24
Payer: MEDICARE

## 2021-09-24 VITALS
HEIGHT: 69 IN | DIASTOLIC BLOOD PRESSURE: 61 MMHG | SYSTOLIC BLOOD PRESSURE: 103 MMHG | WEIGHT: 153.2 LBS | BODY MASS INDEX: 22.69 KG/M2

## 2021-09-24 DIAGNOSIS — R63.0 ANOREXIA: ICD-10-CM

## 2021-09-24 DIAGNOSIS — R63.4 WEIGHT LOSS: Primary | ICD-10-CM

## 2021-09-24 DIAGNOSIS — N28.9 RENAL INSUFFICIENCY: ICD-10-CM

## 2021-09-24 DIAGNOSIS — I50.9 CONGESTIVE HEART FAILURE, UNSPECIFIED HF CHRONICITY, UNSPECIFIED HEART FAILURE TYPE (HCC): ICD-10-CM

## 2021-09-24 PROCEDURE — 99213 OFFICE O/P EST LOW 20 MIN: CPT | Performed by: INTERNAL MEDICINE

## 2021-09-24 ASSESSMENT — ENCOUNTER SYMPTOMS
ABDOMINAL PAIN: 0
DIARRHEA: 0
CHOKING: 0
VOMITING: 0
ANAL BLEEDING: 0
ABDOMINAL DISTENTION: 0
RECTAL PAIN: 0
NAUSEA: 0
CONSTIPATION: 1
BLOOD IN STOOL: 0
COUGH: 0
TROUBLE SWALLOWING: 0

## 2021-09-24 NOTE — PROGRESS NOTES
GI OFFICE FOLLOW UP    INTERVAL HISTORY:   No referring provider defined for this encounter. Chief Complaint   Patient presents with    Follow-up     3m f/u for weight loss       1. Weight loss    2. Anorexia    3. Renal insufficiency    4. Congestive heart failure, unspecified HF chronicity, unspecified heart failure type ([facility]              HISTORY OF PRESENT ILLNESS: Sussy Thornton is a 80 y.o. male with a past history remarkable for ,   Patient seen for follow-up of weight loss. In the last 4 months his weight has been stabilized at about 153 pounds. He is tolerating diet fairly well. No nausea vomiting. Denies abdominal pain. He does have mild constipation. No diarrhea. He has significant cardio renal issues. For this reason, also given his age, further GI work-up was not performed. It was felt that the risks of colon preparation and other invasive procedures outweigh the benefits. Past Medical,Family, and Social History reviewed and does contribute to the patient presenting condition. Patient's PMH/PSH,SH,PSYCH Hx, MEDs, ALLERGIES, and ROS were all reviewed and updated in the appropriate sections.  Yes      PAST MEDICAL HISTORY:  Past Medical History:   Diagnosis Date    BPH (benign prostatic hyperplasia)     secondary to reynolds    CAD (coronary artery disease) 1/18/2005    CRF (chronic renal failure) 01/01    patient denies kidney problems at pre-testing 2015    DVD (dissociated vertical deviation)     Gout     H/O cardiac catheterization 1/18/05    stents     HTN (hypertension)     Hx of blood clots     left leg (over 23 yrs ago)    Hypercholesteremia     Impotence     Irregular heartbeat     Nodular prostate     -bx Dr. Amada Schneider 8/96 and focal inflammation 6/97    OA (osteoarthritis) of knee     Prostate cancer ([facility] 1/06    Unspecified sleep apnea     no machine Past Surgical History:   Procedure Laterality Date    CORONARY ANGIOPLASTY      CYSTOPLASTY N/A     urethral dilation 10/93 Dr Lavonne Lundborg Left     cataract    HAND SURGERY Right 1/08/15    Dr. Jaleel Salinas did right ring and little finger metacarpophalangeal joint extension    JOINT REPLACEMENT      PROSTATE SURGERY      seed implant    REVISION TOTAL HIP ARTHROPLASTY Left 05/07    Dr Tray Amin Right 10/20/2015       CURRENT MEDICATIONS:    Current Outpatient Medications:     Nutritional Supplements (NUTRITIONAL DRINK) LIQD, Take 1 each by mouth 2 times daily, Disp: 60 Can, Rfl: 3    Lift Chair MISC, by Does not apply route (Patient not taking: Reported on 7/19/2021), Disp: 1 each, Rfl: 0    spironolactone (ALDACTONE) 25 MG tablet, Take 1 tablet by mouth daily (Patient not taking: Reported on 9/13/2021), Disp: , Rfl:     bisoprolol (ZEBETA) 5 MG tablet, Take 5 mg by mouth daily, Disp: , Rfl:     polyethylene glycol (MIRALAX) 17 g PACK packet, Take 17 g by mouth daily, Disp: , Rfl:     bumetanide (BUMEX) 2 MG tablet, take 1 tablet by mouth twice a day, Disp: , Rfl:     docusate sodium (COLACE) 100 MG capsule, Take 1 capsule by mouth daily as needed for Constipation, Disp: 30 capsule, Rfl: 1    apixaban (ELIQUIS) 2.5 MG TABS tablet, Take 1 tablet by mouth 2 times daily, Disp: 7 tablet, Rfl: 0    famotidine (PEPCID) 20 MG tablet, Take 1 tablet by mouth daily, Disp: 7 tablet, Rfl: 0    rosuvastatin (CRESTOR) 40 MG tablet, Take 1 tablet by mouth every evening, Disp: 90 tablet, Rfl: 3    aspirin 81 MG tablet, Take 1 tablet by mouth daily (Patient taking differently: Take 81 mg by mouth three times a week ), Disp: 30 tablet, Rfl: 2    ALLERGIES:   Allergies   Allergen Reactions    Ace Inhibitors     Other      Beta-blockers slow heart rate    Timolol      Eye drops slow heart rate       FAMILY HISTORY:       Problem Relation Age of Onset    Coronary Art Dis Mother     Hypertension Mother     Cancer Brother          SOCIAL HISTORY:   Social History     Socioeconomic History    Marital status:      Spouse name: Not on file    Number of children: 11    Years of education: Not on file    Highest education level: Not on file   Occupational History    Occupation: Retired     Employer: [employer]   Tobacco Use    Smoking status: Former Smoker     Types: Cigarettes     Quit date: 10/8/1983     Years since quittin.9    Smokeless tobacco: Never Used   Vaping Use    Vaping Use: Never used   Substance and Sexual Activity    Alcohol use: Not Currently     Comment: holidays (beer)    Drug use: No    Sexual activity: Not Currently     Partners: Female   Other Topics Concern    Not on file   Social History Narrative    Patient is independent, lives alone, still. Rashid Minor or his daughter goes to see almost daily. Social Determinants of Health     Financial Resource Strain: Low Risk     Difficulty of Paying Living Expenses: Not very hard   Food Insecurity: No Food Insecurity    Worried About Running Out of Food in the Last Year: Never true    Nestor of Food in the Last Year: Never true   Transportation Needs: No Transportation Needs    Lack of Transportation (Medical): No    Lack of Transportation (Non-Medical): No   Physical Activity: Inactive    Days of Exercise per Week: 0 days    Minutes of Exercise per Session: 0 min   Stress: No Stress Concern Present    Feeling of Stress : Not at all   Social Connections:  Moderately Isolated    Frequency of Communication with Friends and Family: More than three times a week    Frequency of Social Gatherings with Friends and Family: More than three times a week    Attends Sabianist Services: More than 4 times per year    Active Member of [employer] or Organizations: No    Attends Club or Organization Meetings: Never    Marital Status:    Intimate Partner Violence:     Fear of Current or Ex-Partner:     Emotionally Abused:     Physically Abused:     Sexually Abused:          REVIEW OF SYSTEMS:         Review of Systems   Constitutional: Positive for appetite change. Negative for fatigue. HENT: Negative for trouble swallowing. Eyes: Positive for visual disturbance. Respiratory: Negative for cough and choking. Gastrointestinal: Positive for constipation. Negative for abdominal distention, abdominal pain, anal bleeding, blood in stool, diarrhea, nausea, rectal pain and vomiting. Hematological: Bruises/bleeds easily. PHYSICAL EXAMINATION:     Vital signs reviewed per the nursing documentation. /61   Ht 5' 9\" (1.753 m)   Wt 153 lb 3.2 oz (69.5 kg)   BMI 22.62 kg/m²   Body mass index is 22.62 kg/m². Physical Exam  Vitals reviewed. Constitutional:       Appearance: Normal appearance. Comments: Appears to be mildly dehydrated. May have signs of poor nutrition. HENT:      Head: Normocephalic and atraumatic. Eyes:      Extraocular Movements: Extraocular movements intact. Conjunctiva/sclera: Conjunctivae normal.   Cardiovascular:      Rate and Rhythm: Normal rate and regular rhythm. Heart sounds: Normal heart sounds. Pulmonary:      Effort: Pulmonary effort is normal.      Breath sounds: Normal breath sounds. Abdominal:      General: Bowel sounds are normal. There is no distension. Palpations: Abdomen is soft. There is no mass. Tenderness: There is no abdominal tenderness. There is no guarding. Hernia: No hernia is present. Skin:     General: Skin is warm and dry. Neurological:      General: No focal deficit present. Mental Status: He is alert and oriented to person, place, and time.    Psychiatric:         Mood and Affect: Mood normal.         Behavior: Behavior normal.           LABORATORY DATA: Reviewed  Lab Results   Component Value Date    WBC 4.9 04/28/2021    HGB 13.1 07/29/2021    HCT 42.7 07/29/2021    MCV 85.7 04/28/2021     04/28/2021     09/16/2021    K 4.0 09/16/2021    CL 98 09/16/2021    CO2 27 09/16/2021    BUN 57 (H) 09/16/2021    CREATININE 3.20 (H) 09/16/2021    LABPROT 5.3 (L) 11/18/2012    LABALBU 3.9 07/29/2021    LABALBU 3.9 07/29/2021    BILITOT 0.63 07/29/2021    BILITOT 0.63 07/29/2021    ALKPHOS 87 07/29/2021    ALKPHOS 87 07/29/2021    AST 32 07/29/2021    AST 32 07/29/2021    ALT 15 07/29/2021    ALT 15 07/29/2021    INR 1.2 05/12/2020         Lab Results   Component Value Date    RBC 5.53 04/28/2021    HGB 13.1 07/29/2021    MCV 85.7 04/28/2021    MCH 26.6 04/28/2021    MCHC 31.0 04/28/2021    RDW 15.3 (H) 04/28/2021    MPV 11.3 04/28/2021    BASOPCT 1 10/08/2015    LYMPHSABS 1.34 10/08/2015    MONOSABS 0.64 10/08/2015    NEUTROABS 1.53 (L) 10/08/2015    EOSABS 0.07 10/08/2015    BASOSABS 0.02 10/08/2015         DIAGNOSTIC TESTING:     No results found. Assessment  1. Weight loss    2. Anorexia    3. Renal insufficiency    4. Congestive heart failure, unspecified HF chronicity, unspecified heart failure type ([address]        Plan  At present weight is stabilized. He is doing reasonably well. Given considerable risk of renal failure with colon preparation, decided to manage conservatively. Advised to take Metamucil at bedtime for mild constipation. To follow closely with PCP, renal physician. We'll see him intermittently if he has any major GI issues. Thank you for allowing me to participate in the care of Mr. Izzy Tellez. For any further questions please do not hesitate to contact me. I have reviewed and agree with the ROS entered by the MA/LPN. Note is dictated utilizing voice recognition software. Unfortunately this leads to occasional typographical errors.  Please contact our office if you have any questions        Kenyatta Brown MD,FACP, [facility]  Board Certified in Gastroenterology and [address] [place] [address] Gastroenterology  Office #: [phone]

## 2021-09-27 ENCOUNTER — CARE COORDINATION (OUTPATIENT)
Dept: CARE COORDINATION | Age: 86
End: 2021-09-27

## 2021-09-27 NOTE — CARE COORDINATION
Nutrition Care Coordinator Follow-Up visit:    Food Recall: eating 2-3 meals/d    Activity Level:  Sedentary: X  Lightly Active: Moderately Active:  Very Active:    Adult BMI:  Underweight (below 18.5)  Normal Weight (18.5-24. 9) X  Overweight (25-29. 9)  Obese (30-39. 9)  Morbidly Obese (>40)    Weight Change: weight is up 6# over the past 3 months    Plan:  Plan was established with patient:  Small frequent meals- 6 small meals/d: X  Ensure Plus twice daily: X    Monitoring: Will monitor weight: X  Will monitor adherence to meal plan: Will monitor adherence to exercise plan: Will monitor HGA1c:    Handouts Provided :  High Calorie/protein food list: x    Goals:  Patient goals set:  1. Reviewed drinking nutritional supplement, he is drinking Ensure Plus now 1-2 times/day, reviewed goal of drinking Ensure Plus twice daily between meals. 2. Reviewed eating small frequent meals/snacks.  Patient is only eating 2-3 times a day. Encouraged to increase to small frequent meals every 2-3 hours, goal is 6 small meals/day. 3. Reviewed high calorie/protein foods. Reviewed list of high calorie/protein foods and snacks. Encouraged to include at least 1 food item off list at each small meal daily.    Patient relays continues to do well drinking Ensure Plus now twice daily most days and has plenty left at this time. Reviewed high calorie/protein foods to add to meals/snacks and also encouraged to increase the amount of times he is eating daily. Will follow up in 3-4 weeks to review and answer questions.      Tish Schilder

## 2021-10-11 DIAGNOSIS — R63.4 WEIGHT LOSS: ICD-10-CM

## 2021-10-11 DIAGNOSIS — R63.0 LOSS OF APPETITE: ICD-10-CM

## 2021-10-11 RX ORDER — MEDICAL SUPPLY, MISCELLANEOUS
1 EACH MISCELLANEOUS 2 TIMES DAILY
Qty: 237 ML | Refills: 5 | Status: SHIPPED | OUTPATIENT
Start: 2021-10-11

## 2021-10-11 NOTE — TELEPHONE ENCOUNTER
pt called to report problem with Ensure. Pt recently rec'd plain ensure and usually gets ensure plus.   asking for new order to be sent to [place].

## 2021-10-12 ENCOUNTER — TELEPHONE (OUTPATIENT)
Dept: FAMILY MEDICINE CLINIC | Age: 86
End: 2021-10-12

## 2021-10-12 ENCOUNTER — CARE COORDINATION (OUTPATIENT)
Dept: CARE COORDINATION | Age: 86
End: 2021-10-12

## 2021-10-12 NOTE — CARE COORDINATION
with the patient who verbalized understanding. Discussed COVID vaccination status: Yes. Education provided on COVID-19 vaccination as appropriate. Discussed exposure protocols and quarantine with CDC Guidelines. Patient was given an opportunity to verbalize any questions and concerns and agrees to contact ACM or health care provider for questions related to their healthcare. Advance Care Planning  People with COVID-19 may have no symptoms, mild symptoms, such as fever, cough, and shortness of breath or they may have more severe illness, developing severe and fatal pneumonia. As a result, Advance Care Planning with attention to naming a health care decision maker (someone you trust to make healthcare decisions for you if you could not speak for yourself) and sharing other health care preferences is important BEFORE a possible health crisis. Please contact your Primary Care Provider to discuss Advance Care Planning. Preventing the Spread of Coronavirus Disease 2019 in Homes and Residential Communities  For the most recent information go to [email or web address]    Prevention steps for People with confirmed or suspected COVID-19 (including persons under investigation) who do not need to be hospitalized  and   People with confirmed COVID-19 who were hospitalized and determined to be medically stable to go home    Your healthcare provider and public health staff will evaluate whether you can be cared for at home. If it is determined that you do not need to be hospitalized and can be isolated at home, you will be monitored by staff from your local or state health department. You should follow the prevention steps below until a healthcare provider or local or state health department says you can return to your normal activities. Stay home except to get medical care  People who are mildly ill with COVID-19 are able to isolate at home during their illness.  You should restrict activities outside your home, except for getting medical care. Do not go to work, school, or public areas. Avoid using public transportation, ride-sharing, or taxis. Separate yourself from other people and animals in your home  People: As much as possible, you should stay in a specific room and away from other people in your home. Also, you should use a separate bathroom, if available. Animals: You should restrict contact with pets and other animals while you are sick with COVID-19, just like you would around other people. Although there have not been reports of pets or other animals becoming sick with COVID-19, it is still recommended that people sick with COVID-19 limit contact with animals until more information is known about the virus. When possible, have another member of your household care for your animals while you are sick. If you are sick with COVID-19, avoid contact with your pet, including petting, snuggling, being kissed or licked, and sharing food. If you must care for your pet or be around animals while you are sick, wash your hands before and after you interact with pets and wear a facemask. Call ahead before visiting your doctor  If you have a medical appointment, call the healthcare provider and tell them that you have or may have COVID-19. This will help the healthcare providers office take steps to keep other people from getting infected or exposed. Wear a facemask  You should wear a facemask when you are around other people (e.g., sharing a room or vehicle) or pets and before you enter a healthcare providers office. If you are not able to wear a facemask (for example, because it causes trouble breathing), then people who live with you should not stay in the same room with you, or they should wear a facemask if they enter your room. Cover your coughs and sneezes  Cover your mouth and nose with a tissue when you cough or sneeze. Throw used tissues in a lined trash can.  Immediately wash your hands with soap and water for at least 20 seconds or, if soap and water are not available, clean your hands with an alcohol-based hand  that contains at least 60% alcohol. Clean your hands often  Wash your hands often with soap and water for at least 20 seconds, especially after blowing your nose, coughing, or sneezing; going to the bathroom; and before eating or preparing food. If soap and water are not readily available, use an alcohol-based hand  with at least 60% alcohol, covering all surfaces of your hands and rubbing them together until they feel dry. Soap and water are the best option if hands are visibly dirty. Avoid touching your eyes, nose, and mouth with unwashed hands. Avoid sharing personal household items  You should not share dishes, drinking glasses, cups, eating utensils, towels, or bedding with other people or pets in your home. After using these items, they should be washed thoroughly with soap and water. Clean all high-touch surfaces everyday  High touch surfaces include counters, tabletops, doorknobs, bathroom fixtures, toilets, phones, keyboards, tablets, and bedside tables. Also, clean any surfaces that may have blood, stool, or body fluids on them. Use a household cleaning spray or wipe, according to the label instructions. Labels contain instructions for safe and effective use of the cleaning product including precautions you should take when applying the product, such as wearing gloves and making sure you have good ventilation during use of the product. Monitor your symptoms  Seek prompt medical attention if your illness is worsening (e.g., difficulty breathing). Before seeking care, call your healthcare provider and tell them that you have, or are being evaluated for, COVID-19. Put on a facemask before you enter the facility.  These steps will help the healthcare providers office to keep other people in the office or waiting room from getting infected or exposed. Ask your healthcare provider to call the local or state health department. Persons who are placed under active monitoring or facilitated self-monitoring should follow instructions provided by their local health department or occupational health professionals, as appropriate. When working with your local health department check their available hours. If you have a medical emergency and need to call 911, notify the dispatch personnel that you have, or are being evaluated for COVID-19. If possible, put on a facemask before emergency medical services arrive. Discontinuing home isolation  Patients with confirmed COVID-19 should remain under home isolation precautions until the risk of secondary transmission to others is thought to be low. The decision to discontinue home isolation precautions should be made on a case-by-case basis, in consultation with healthcare providers and state and local health departments. Goals Addressed                 This Visit's Progress     Conditions and Symptoms   On track     I will schedule office visits, as directed by my provider. I will keep my appointment or reschedule if I have to cancel. I will notify my provider of any barriers to my plan of care. I will follow my Zone Management tool to seek urgent or emergent care. I will notify my provider of any symptoms that indicate a worsening of my condition.     Barriers: fear of failure, overwhelmed by complexity of regimen, and lack of education  Plan for overcoming my barriers: will work with Care Manager  Confidence: 8/10  Anticipated Goal Completion Date: 8/1/2021         Nutrition Plan   Improving     I will work on eating small frequent meals- 6 small meals/day, choose high calorie/protein foods  and use Ensure twice daily between meals    Barriers: lack of education  Plan for overcoming my barriers: CC dietitian to educate on eating small more frequent meals, choosing high calorie/protein foods and using

## 2021-10-18 ENCOUNTER — CARE COORDINATION (OUTPATIENT)
Dept: CARE COORDINATION | Age: 86
End: 2021-10-18

## 2021-10-18 ENCOUNTER — OFFICE VISIT (OUTPATIENT)
Dept: INTERNAL MEDICINE CLINIC | Age: 86
End: 2021-10-18
Payer: MEDICARE

## 2021-10-18 ENCOUNTER — HOSPITAL ENCOUNTER (OUTPATIENT)
Age: 86
Setting detail: SPECIMEN
Discharge: HOME | End: 2021-10-18
Payer: MEDICARE

## 2021-10-18 VITALS
WEIGHT: 156.8 LBS | DIASTOLIC BLOOD PRESSURE: 60 MMHG | RESPIRATION RATE: 16 BRPM | HEIGHT: 69 IN | TEMPERATURE: 97.2 F | SYSTOLIC BLOOD PRESSURE: 120 MMHG | BODY MASS INDEX: 23.22 KG/M2 | HEART RATE: 84 BPM

## 2021-10-18 DIAGNOSIS — Z00.00 ROUTINE GENERAL MEDICAL EXAMINATION AT A HEALTH CARE FACILITY: Primary | ICD-10-CM

## 2021-10-18 LAB
ANION GAP SERPL CALCULATED.3IONS-SCNC: 13 MMOL/L (ref 9–17)
BUN BLDV-MCNC: 58 MG/DL (ref 8–23)
BUN/CREAT BLD: ABNORMAL (ref 9–20)
CALCIUM SERPL-MCNC: 9.8 MG/DL (ref 8.6–10.4)
CHLORIDE BLD-SCNC: 102 MMOL/L (ref 98–107)
CO2: 27 MMOL/L (ref 20–31)
CREAT SERPL-MCNC: 3.14 MG/DL (ref 0.7–1.2)
GFR AFRICAN AMERICAN: 23 ML/MIN
GFR NON-AFRICAN AMERICAN: 19 ML/MIN
GFR SERPL CREATININE-BSD FRML MDRD: ABNORMAL ML/MIN/{1.73_M2}
GFR SERPL CREATININE-BSD FRML MDRD: ABNORMAL ML/MIN/{1.73_M2}
GLUCOSE BLD-MCNC: 73 MG/DL (ref 70–99)
POTASSIUM SERPL-SCNC: 4.2 MMOL/L (ref 3.7–5.3)
SODIUM BLD-SCNC: 142 MMOL/L (ref 135–144)

## 2021-10-18 PROCEDURE — G0439 PPPS, SUBSEQ VISIT: HCPCS | Performed by: INTERNAL MEDICINE

## 2021-10-18 ASSESSMENT — LIFESTYLE VARIABLES
HOW OFTEN DO YOU HAVE SIX OR MORE DRINKS ON ONE OCCASION: 0
HOW OFTEN DO YOU HAVE A DRINK CONTAINING ALCOHOL: 1
HOW MANY STANDARD DRINKS CONTAINING ALCOHOL DO YOU HAVE ON A TYPICAL DAY: 0
AUDIT-C TOTAL SCORE: 1

## 2021-10-18 ASSESSMENT — PATIENT HEALTH QUESTIONNAIRE - PHQ9
SUM OF ALL RESPONSES TO PHQ QUESTIONS 1-9: 0
1. LITTLE INTEREST OR PLEASURE IN DOING THINGS: 0
SUM OF ALL RESPONSES TO PHQ QUESTIONS 1-9: 0
SUM OF ALL RESPONSES TO PHQ QUESTIONS 1-9: 0
2. FEELING DOWN, DEPRESSED OR HOPELESS: 0
SUM OF ALL RESPONSES TO PHQ9 QUESTIONS 1 & 2: 0

## 2021-10-18 NOTE — PATIENT INSTRUCTIONS
Personalized Preventive Plan for Julieth Diaz - 10/18/2021  Medicare offers a range of preventive health benefits. Some of the tests and screenings are paid in full while other may be subject to a deductible, co-insurance, and/or copay. Some of these benefits include a comprehensive review of your medical history including lifestyle, illnesses that may run in your family, and various assessments and screenings as appropriate. After reviewing your medical record and screening and assessments performed today your provider may have ordered immunizations, labs, imaging, and/or referrals for you. A list of these orders (if applicable) as well as your Preventive Care list are included within your After Visit Summary for your review. Other Preventive Recommendations:    · A preventive eye exam performed by an eye specialist is recommended every 1-2 years to screen for glaucoma; cataracts, macular degeneration, and other eye disorders. · A preventive dental visit is recommended every 6 months. · Try to get at least 150 minutes of exercise per week or 10,000 steps per day on a pedometer . · Order or download the FREE \"Exercise & Physical Activity: Your Everyday Guide\" from [employer]. Call 6-846.557.7477 or search [employer] online. · You need 2814-3976 mg of calcium and 5192-4676 IU of vitamin D per day. It is possible to meet your calcium requirement with diet alone, but a vitamin D supplement is usually necessary to meet this goal.  · When exposed to the sun, use a sunscreen that protects against both UVA and UVB radiation with an SPF of 30 or greater. Reapply every 2 to 3 hours or after sweating, drying off with a towel, or swimming. · Always wear a seat belt when traveling in a car. Always wear a helmet when riding a bicycle or motorcycle.

## 2021-10-18 NOTE — CARE COORDINATION
Nutrition Care Coordinator Follow-Up visit:    Food Recall: eating 2-3 meals/d    Activity Level:  Sedentary: X  Lightly Active: Moderately Active:  Very Active:    Adult BMI:  Underweight (below 18.5)  Normal Weight (18.5-24. 9) X  Overweight (25-29. 9)  Obese (30-39. 9)  Morbidly Obese (>40)    Weight Change: weight is up 3# over the past month    Plan:  Plan was established with patient:  Ensure Plus 2-3 times/d: X  Small frequent meals- 6 small meals/d: X    Monitoring: Will monitor weight:  Will monitor adherence to meal plan: Will monitor adherence to exercise plan: Will monitor HGA1c: X    Handouts Provided :  High calorie/protein foods: X    Goals:  Patient goals set:  1. Reviewed drinking nutritional supplement, he is drinking Ensure Plus now twice a day most days. Reviewed goal of drinking Ensure Plus twice daily between meals. 2. Reviewed eating small frequent meals/snacks.  Patient is only eating 2-3 times a day. Encouraged to increase to small frequent meals every 2-3 hours, goal is 6 small meals/day. 3. Reviewed high calorie/protein foods. Reviewed list of high calorie/protein foods and snacks. Encouraged to include at least 1 food item off list at each small meal daily.    Patient relays continues to do well drinking Ensure Plus now twice daily most days and has been slowly gaining weight.  Reviewed high calorie/protein foods to add to meals/snacks and also encouraged to increase the amount of times he is eating daily.  Will follow up in 3-4 weeks to review and answer questions        Oleg Eddy

## 2021-10-18 NOTE — PROGRESS NOTES
Medicare Annual Wellness Visit  Name: Shaggy Bryant Date: 10/18/2021   MRN: [id number] Sex: Male   Age: 80 y.o. Ethnicity: Non- / Non    : 10/21/1930 Race: Malachi Talbert / Elie Covarrubias is here for [employer]    Screenings for behavioral, psychosocial and functional/safety risks, and cognitive dysfunction are all negative except as indicated below. These results, as well as other patient data from the [address] [facility], [place], operated by [facility] form, are documented in Flowsheets linked to this Encounter. Allergies   Allergen Reactions    Ace Inhibitors     Other      Beta-blockers slow heart rate    Timolol      Eye drops slow heart rate       Prior to Visit Medications    Medication Sig Taking?  Authorizing Provider   Nutritional Supplements (NUTRITIONAL DRINK) LIQD Take 1 each by mouth 2 times daily 1 can twice a day Yes Charmaine Serrano MD   spironolactone (ALDACTONE) 25 MG tablet Take 1 tablet by mouth daily  Yes Historical Provider, MD   bisoprolol (ZEBETA) 5 MG tablet Take 5 mg by mouth daily Yes Historical Provider, MD   polyethylene glycol (MIRALAX) 17 g PACK packet Take 17 g by mouth daily Yes Historical Provider, MD   bumetanide (BUMEX) 2 MG tablet take 1 tablet by mouth twice a day Yes Historical Provider, MD   docusate sodium (COLACE) 100 MG capsule Take 1 capsule by mouth daily as needed for Constipation Yes Charmaine Serrano MD   apixaban (ELIQUIS) 2.5 MG TABS tablet Take 1 tablet by mouth 2 times daily Yes Charmaine Serrano MD   famotidine (PEPCID) 20 MG tablet Take 1 tablet by mouth daily Yes Charmaine Serrano MD   rosuvastatin (CRESTOR) 40 MG tablet Take 1 tablet by mouth every evening Yes Charmaine eSrrano MD   aspirin 81 MG tablet Take 1 tablet by mouth daily  Patient taking differently: Take 81 mg by mouth three times a week  Yes Charmaine Serrano MD       Past Medical History:   Diagnosis Date    BPH (benign prostatic hyperplasia)     secondary to reynolds    CAD (coronary artery disease) 1/18/2005    CRF (chronic renal failure) 01/01    patient denies kidney problems at pre-testing 2015    DVD (dissociated vertical deviation)     Gout     H/O cardiac catheterization 1/18/05    stents     HTN (hypertension)     Hx of blood clots     left leg (over 23 yrs ago)    Hypercholesteremia     Impotence     Irregular heartbeat     Nodular prostate     -bx Dr. Eula Ramos 8/96 and focal inflammation 6/97    OA (osteoarthritis) of knee     Prostate cancer ([facility] [address] 1/06    Unspecified sleep apnea     no machine       Past Surgical History:   Procedure Laterality Date    CORONARY ANGIOPLASTY      CYSTOPLASTY N/A     urethral dilation 10/93 Dr Natalya Mars Right 1/08/15    Dr. Mere Barnard did right ring and little finger metacarpophalangeal joint extension    JOINT REPLACEMENT      PROSTATE SURGERY      seed implant    REVISION TOTAL HIP ARTHROPLASTY Left 05/07    Dr Sheridan Rene Right 10/20/2015       Family History   Problem Relation Age of Onset    Coronary Art Dis Mother     Hypertension Mother     Cancer Brother        CareTeam (Including outside providers/suppliers regularly involved in providing care):   Patient Care Team:  Elfredia Habermann, MD as PCP - General  Elfredia Habermann, MD as PCP - [facility]  Ravindra Adorno MD as Consulting Physician (Cardiology)  Chrystal Mack MD as Consulting Physician (Nephrology)  Geo Pearson RN as [address] [place] [address]  Rina Olson MD as Consulting Physician (Gastroenterology)  Gerson Washington RD, LD as Dietitian    Wt Readings from Last 3 Encounters:   10/18/21 156 lb 12.8 oz (71.1 kg)   09/24/21 153 lb 3.2 oz (69.5 kg)   09/13/21 151 lb 6.4 oz (68.7 kg)     Vitals:    10/18/21 0847   BP: 120/60   Site: Left Upper Arm   Position: Sitting   Cuff Size: Medium Adult   Pulse: 84   Resp: 16   Temp: 97.2 °F (36.2 °C)   TempSrc: Infrared   Weight: 156 lb 12.8 oz (71.1 kg)   Height: 5' 9\" (1.753 m)     Body mass index is 23.16 kg/m². Based upon direct observation of the patient, evaluation of cognition reveals recent and remote memory intact. Patient's complete Health Risk Assessment and screening values have been reviewed and are found in Flowsheets. The following problems were reviewed today and where indicated follow up appointments were made and/or referrals ordered. Positive Risk Factor Screenings with Interventions:          General Health and ACP:  General  In general, how would you say your health is?: Fair  In the past 7 days, have you experienced any of the following?  New or Increased Pain, New or Increased Fatigue, Loneliness, Social Isolation, Stress or Anger?: None of These  Do you get the social and emotional support that you need?: Yes  Do you have a Living Will?: Yes  Advance Directives     Power of [facility] Will ACP-Advance Directive ACP-Power of     Not on File Not on File Not on File Not on File      General Health Risk Interventions:  · Poor self-assessment of health status: patient declines any further evaluation/treatment for this issue    Health Habits/Nutrition:  Health Habits/Nutrition  Do you exercise for at least 20 minutes 2-3 times per week?: Yes  Have you lost any weight without trying in the past 3 months?: (!) Yes  Do you eat only one meal per day?: No  Have you seen the dentist within the past year?: Yes  Body mass index: 23.15  Health Habits/Nutrition Interventions:  · Nutritional issues:  pt currently drinking ensure daily and trying to increase his protein intake during meals     Safety:  Safety  Do you have working smoke detectors?: Yes  Have all throw rugs been removed or fastened?: (!) No  Do you have non-slip mats or surfaces in all bathtubs/showers?: (!) No  Do all of your stairways have a railing or banister?: Yes  Are your doorways, halls and stairs free of clutter?: Yes  Do you always fasten your seatbelt when you are in a car?: Yes  Safety Interventions:  · encouraged caution with throw rug and shower safety     Personalized Preventive Plan   Current Health Maintenance Status  Immunization History   Administered Date(s) Administered    COVID-19, Yaya SaabPAYTON, 30mcg/0.3mL 01/21/2021, 02/11/2021    Influenza Vaccine, unspecified formulation 10/13/2015, 09/21/2016    Influenza Virus Vaccine 11/15/2017    Influenza, Quadv, adjuvanted, 65 yrs +, IM, PF (Fluad) 09/13/2021    Influenza, Triv, inactivated, subunit, adjuvanted, IM (Fluad 65 yrs and older) 10/03/2018, 11/15/2019    Pneumococcal Conjugate 13-valent ([id number]) 10/13/2015    Pneumococcal Conjugate 7-valent (Prevnar7) 06/17/1993, 10/09/2000    Pneumococcal Polysaccharide ([id number]) 01/20/2020    Tetanus 06/13/1993, 03/01/2004        Health Maintenance   Topic Date Due    DTaP/Tdap/Td vaccine (1 - Tdap) Never done    Shingles Vaccine (1 of 2) Never done    PSA counseling  02/19/2021    Annual Wellness Visit (AWV)  07/08/2021    COVID-19 Vaccine (3 - Pfizer booster) 08/11/2021    Lipid screen  04/28/2022    Potassium monitoring  09/16/2022    Creatinine monitoring  09/16/2022    Flu vaccine  Completed    Pneumococcal 65+ yrs at Risk Vaccine  Completed    Hepatitis A vaccine  Aged Out    Hepatitis B vaccine  Aged Out    Hib vaccine  Aged Out    Meningococcal (ACWY) vaccine  Aged Out     Recommendations for Gema Due: see orders and patient instructions/AVS.  . Recommended screening schedule for the next 5-10 years is provided to the patient in written form: see Patient Instructions/AVS.    I, Heidi Dodson RN, 10/18/2021, performed the documented evaluation under the direct supervision of the attending physician.

## 2021-10-20 RX ORDER — POLYETHYLENE GLYCOL 3350 17 G/17G
17 POWDER, FOR SOLUTION ORAL DAILY
Qty: 100 EACH | Refills: 0 | Status: SHIPPED | OUTPATIENT
Start: 2021-10-20 | End: 2022-03-14

## 2021-10-20 NOTE — TELEPHONE ENCOUNTER
Ivan Lozano is calling to request a refill on the following medication(s):    Medication Request:    Noted as historic med 4/8/21    Requested Prescriptions     Pending Prescriptions Disp Refills    polyethylene glycol (MIRALAX) 17 g PACK packet 100 each 0     Sig: Take 17 g by mouth daily       Last Visit Date (If Applicable):  64/50/5978    Next Visit Date:    3/14/2022

## 2021-10-29 ENCOUNTER — CARE COORDINATION (OUTPATIENT)
Dept: CARE COORDINATION | Age: 86
End: 2021-10-29

## 2021-10-29 NOTE — CARE COORDINATION
Ambulatory Care Coordination Note  10/29/2021  CM Risk Score: 3  Charlson 10 Year Mortality Risk Score: 100%     ACC: Aleah Olmos RN    Summary Note: spoke to patient who stated he is doing good. Denies having any symptoms of concern or needs. Stated he has been getting his Ensure supplements delivered to him now. He is drinking 1-2 per day. Stated he has gained couple pounds back. He is taking miralax for constipation and is working pretty good. Stated the dietician has been calling him and encouraging him to eat more often. CHF: he denies having any swelling of lower legs or feet and stated his weight fluctuates up and down 1-2 lbs. Instructed to avoid eating salty foods or adding table salt to cooked food. Reviewed and provided education for zones and symptoms to call nurse or the office. Pt verbalized understanding. Discussed with Aislinn Quarles about staying away from anyone with flu-like symptoms/respiratory infections. Patient to stay in and away from the public as much as possible right now. Discussed frequent hand washing. Did discuss the flu shot with patient; patient reports he already had flu shot in the office. See CC Education. Encouraged patient to call with symptoms or concerns. CM Plan:  -will update Dr. Mars Armstrong and inform of patient readiness for graduation from Care Management.      Congestive Heart Failure Assessment    Are you currently restricting fluids?: 1800cc  Do you understand a low sodium diet?: Yes  Do you understand how to read food labels?: Yes  Do you salt your food before tasting it?: No     No patient-reported symptoms      Symptoms:  None: Yes      Symptom course: stable  Weight trend: stable  Salt intake watch compared to last visit: stable         Care Coordination Interventions    Program Enrollment: Complex Care  Referral from Primary Care Provider: No  Suggested Interventions and Community Resources  Fall Risk Prevention: Completed  [employer] Navigator: Completed (Comment: Elena referral for [employer] [facility], 14 day meals delivered.)  Meals on Wheels: Completed  Registered Dietician: Completed (Comment: CC LD)  Zone Management Tools: Completed (Comment: CHF zone tool)       Educated patient about risk for severe COVID-19 due to risk factors according to CDC guidelines. ACM reviewed discharge instructions, medical action plan and red flag symptoms with the patient who verbalized understanding. Discussed COVID vaccination status: Yes. Education provided on COVID-19 vaccination as appropriate. Discussed exposure protocols and quarantine with CDC Guidelines. Patient was given an opportunity to verbalize any questions and concerns and agrees to contact ACM or health care provider for questions related to their healthcare. Advance Care Planning  People with COVID-19 may have no symptoms, mild symptoms, such as fever, cough, and shortness of breath or they may have more severe illness, developing severe and fatal pneumonia. As a result, Advance Care Planning with attention to naming a health care decision maker (someone you trust to make healthcare decisions for you if you could not speak for yourself) and sharing other health care preferences is important BEFORE a possible health crisis. Please contact your Primary Care Provider to discuss Advance Care Planning. Preventing the Spread of Coronavirus Disease 2019 in Homes and Residential Communities  For the most recent information go to [email or web address]    Prevention steps for People with confirmed or suspected COVID-19 (including persons under investigation) who do not need to be hospitalized  and   People with confirmed COVID-19 who were hospitalized and determined to be medically stable to go home    Your healthcare provider and public health staff will evaluate whether you can be cared for at home.  If it is determined that you do not need to be hospitalized and can be isolated at home, you will be monitored by staff from your local or state health department. You should follow the prevention steps below until a healthcare provider or local or state health department says you can return to your normal activities. Stay home except to get medical care  People who are mildly ill with COVID-19 are able to isolate at home during their illness. You should restrict activities outside your home, except for getting medical care. Do not go to work, school, or public areas. Avoid using public transportation, ride-sharing, or taxis. Separate yourself from other people and animals in your home  People: As much as possible, you should stay in a specific room and away from other people in your home. Also, you should use a separate bathroom, if available. Animals: You should restrict contact with pets and other animals while you are sick with COVID-19, just like you would around other people. Although there have not been reports of pets or other animals becoming sick with COVID-19, it is still recommended that people sick with COVID-19 limit contact with animals until more information is known about the virus. When possible, have another member of your household care for your animals while you are sick. If you are sick with COVID-19, avoid contact with your pet, including petting, snuggling, being kissed or licked, and sharing food. If you must care for your pet or be around animals while you are sick, wash your hands before and after you interact with pets and wear a facemask. Call ahead before visiting your doctor  If you have a medical appointment, call the healthcare provider and tell them that you have or may have COVID-19. This will help the healthcare providers office take steps to keep other people from getting infected or exposed.   Wear a facemask  You should wear a facemask when you are around other people (e.g., sharing a room or vehicle) or pets and before you enter a healthcare providers office. If you are not able to wear a facemask (for example, because it causes trouble breathing), then people who live with you should not stay in the same room with you, or they should wear a facemask if they enter your room. Cover your coughs and sneezes  Cover your mouth and nose with a tissue when you cough or sneeze. Throw used tissues in a lined trash can. Immediately wash your hands with soap and water for at least 20 seconds or, if soap and water are not available, clean your hands with an alcohol-based hand  that contains at least 60% alcohol. Clean your hands often  Wash your hands often with soap and water for at least 20 seconds, especially after blowing your nose, coughing, or sneezing; going to the bathroom; and before eating or preparing food. If soap and water are not readily available, use an alcohol-based hand  with at least 60% alcohol, covering all surfaces of your hands and rubbing them together until they feel dry. Soap and water are the best option if hands are visibly dirty. Avoid touching your eyes, nose, and mouth with unwashed hands. Avoid sharing personal household items  You should not share dishes, drinking glasses, cups, eating utensils, towels, or bedding with other people or pets in your home. After using these items, they should be washed thoroughly with soap and water. Clean all high-touch surfaces everyday  High touch surfaces include counters, tabletops, doorknobs, bathroom fixtures, toilets, phones, keyboards, tablets, and bedside tables. Also, clean any surfaces that may have blood, stool, or body fluids on them. Use a household cleaning spray or wipe, according to the label instructions. Labels contain instructions for safe and effective use of the cleaning product including precautions you should take when applying the product, such as wearing gloves and making sure you have good ventilation during use of the product.   Monitor your symptoms  Seek prompt medical attention if your illness is worsening (e.g., difficulty breathing). Before seeking care, call your healthcare provider and tell them that you have, or are being evaluated for, COVID-19. Put on a facemask before you enter the facility. These steps will help the healthcare providers office to keep other people in the office or waiting room from getting infected or exposed. Ask your healthcare provider to call the local or state health department. Persons who are placed under active monitoring or facilitated self-monitoring should follow instructions provided by their local health department or occupational health professionals, as appropriate. When working with your local health department check their available hours. If you have a medical emergency and need to call 911, notify the dispatch personnel that you have, or are being evaluated for COVID-19. If possible, put on a facemask before emergency medical services arrive. Discontinuing home isolation  Patients with confirmed COVID-19 should remain under home isolation precautions until the risk of secondary transmission to others is thought to be low. The decision to discontinue home isolation precautions should be made on a case-by-case basis, in consultation with healthcare providers and [facility] and [facility] health departments. Goals Addressed                 This Visit's Progress     Conditions and Symptoms   On track     I will schedule office visits, as directed by my provider. I will keep my appointment or reschedule if I have to cancel. I will notify my provider of any barriers to my plan of care. I will follow my Zone Management tool to seek urgent or emergent care. I will notify my provider of any symptoms that indicate a worsening of my condition.     Barriers: fear of failure, overwhelmed by complexity of regimen, and lack of education  Plan for overcoming my barriers: will work with Care Manager  Confidence: 8/10  Anticipated Goal Completion Date: 8/1/2021         Nutrition Plan   On track     I will work on eating small frequent meals- 6 small meals/day, choose high calorie/protein foods  and use Ensure twice daily between meals    Barriers: lack of education  Plan for overcoming my barriers: CC dietitian to educate on eating small more frequent meals, choosing high calorie/protein foods and using Ensure daily  Confidence: 6/10  Anticipated Goal Completion Date: 10/20/21  Updated goal 10/18/21- patient will continue to work on eating smaller more frequent meals and use Ensure Plus twice daily. New gaol date 12/30/21            Prior to Admission medications    Medication Sig Start Date End Date Taking?  Authorizing Provider   polyethylene glycol (MIRALAX) 17 g PACK packet Take 17 g by mouth daily 10/20/21  Yes Kipp Kocher, MD   Nutritional Supplements (NUTRITIONAL DRINK) LIQD Take 1 each by mouth 2 times daily 1 can twice a day 10/11/21  Yes Kipp Kocher, MD   spironolactone (ALDACTONE) 25 MG tablet Take 1 tablet by mouth daily  4/29/21  Yes Historical Provider, MD   bisoprolol (ZEBETA) 5 MG tablet Take 5 mg by mouth daily   Yes Historical Provider, MD   bumetanide (BUMEX) 2 MG tablet take 1 tablet by mouth twice a day 3/18/21  Yes Historical Provider, MD   apixaban (ELIQUIS) 2.5 MG TABS tablet Take 1 tablet by mouth 2 times daily 1/3/19  Yes Kipp Kocher, MD   famotidine (PEPCID) 20 MG tablet Take 1 tablet by mouth daily 1/3/19  Yes Kipp Kocher, MD   rosuvastatin (CRESTOR) 40 MG tablet Take 1 tablet by mouth every evening 12/20/18  Yes Kipp Kocher, MD   aspirin 81 MG tablet Take 1 tablet by mouth daily  Patient taking differently: Take 81 mg by mouth three times a week  12/20/18  Yes Kipp Kocher, MD   docusate sodium (COLACE) 100 MG capsule Take 1 capsule by mouth daily as needed for Constipation  Patient not taking: Reported on 10/29/2021 3/4/21   Kipp Kocher, MD       Future Appointments   Date Time Provider Oscar Deras   3/14/2022 10:15 AM Nancy Henderson MD [facility]     Patient Care Team:  Nancy Henderson MD as PCP - General  Nancy Henderson MD as PCP - [facility] Empaneled Provider  Yoel Christianson MD as Consulting Physician (Cardiology)  Dk Loya MD as Consulting Physician (Nephrology)  Leda José RN as [address] [place] [address]  Jordan Roman MD as Consulting Physician (Gastroenterology)  Michelle Linares RD, LD as Dietitian

## 2021-10-29 NOTE — LETTER
11/1/2021    23 Selene Gramajoi Will Said Wang Swan on the progress you have made improving and taking charge of your health! Your recent follow up with Tasneem Das MD finds you doing well and are no longer in need of Care Coordination services. I know you will continue to use the knowledge and tools you have gained to continue down a healthy path. As you have demonstrated that you are able to successfully manage your health and wellness, I will no longer contact you on a regular basis. Again, congratulations and please know if there are any changes or you have a need for my services in the future, you may always contact me for questions or concerns.   In good health,       Rain Warren RN

## 2021-11-03 ENCOUNTER — CARE COORDINATION (OUTPATIENT)
Dept: CARE COORDINATION | Age: 86
End: 2021-11-03

## 2021-11-09 ENCOUNTER — CARE COORDINATION (OUTPATIENT)
Dept: CARE COORDINATION | Age: 86
End: 2021-11-09

## 2021-11-09 NOTE — CARE COORDINATION
Patient goals reviewed:  1. Reviewed drinking nutritional supplement, he is drinking Ensure Plus now twice a day most days. Reviewed goal of drinking Ensure Plus twice daily between meals. 2. Reviewed eating small frequent meals/snacks.  Patient is only eating 2-3 times a day. Encouraged to increase to small frequent meals every 2-3 hours, goal is 6 small meals/day. 3. Reviewed high calorie/protein foods. Reviewed list of high calorie/protein foods and snacks. Encouraged to include at least 1 food item off list at each small meal daily.    11/9/21-patient doing well, appetite has improved and he is drinking Ensure Plus 1-2 times daily. He has gained a few pound and is happy about that. Patient states no further nutrition questions. Encouraged patient to continue to call for refills on Ensure when he is getting low. Will remove from panel- CC graduation.   JUSTINE Villa

## 2021-11-16 ENCOUNTER — HOSPITAL ENCOUNTER (OUTPATIENT)
Age: 86
Setting detail: SPECIMEN
Discharge: HOME | End: 2021-11-16

## 2021-11-16 LAB
-: NORMAL
ALBUMIN SERPL-MCNC: 3.9 G/DL (ref 3.5–5.2)
ALBUMIN/GLOBULIN RATIO: 1.3 (ref 1–2.5)
ALP BLD-CCNC: 97 U/L (ref 40–129)
ALT SERPL-CCNC: 16 U/L (ref 5–41)
AMORPHOUS: NORMAL
ANION GAP SERPL CALCULATED.3IONS-SCNC: 13 MMOL/L (ref 9–17)
AST SERPL-CCNC: 33 U/L
BACTERIA: NORMAL
BILIRUB SERPL-MCNC: 0.4 MG/DL (ref 0.3–1.2)
BILIRUBIN URINE: NEGATIVE
BUN BLDV-MCNC: 52 MG/DL (ref 8–23)
BUN/CREAT BLD: ABNORMAL (ref 9–20)
CALCIUM SERPL-MCNC: 9.6 MG/DL (ref 8.6–10.4)
CASTS UA: NORMAL /LPF (ref 0–8)
CHLORIDE BLD-SCNC: 101 MMOL/L (ref 98–107)
CO2: 25 MMOL/L (ref 20–31)
COLOR: YELLOW
COMMENT UA: ABNORMAL
CREAT SERPL-MCNC: 2.64 MG/DL (ref 0.7–1.2)
CREATININE URINE: 49.6 MG/DL (ref 39–259)
CRYSTALS, UA: NORMAL /HPF
EPITHELIAL CELLS UA: NORMAL /HPF (ref 0–5)
GFR AFRICAN AMERICAN: 28 ML/MIN
GFR NON-AFRICAN AMERICAN: 23 ML/MIN
GFR SERPL CREATININE-BSD FRML MDRD: ABNORMAL ML/MIN/{1.73_M2}
GFR SERPL CREATININE-BSD FRML MDRD: ABNORMAL ML/MIN/{1.73_M2}
GLUCOSE BLD-MCNC: 99 MG/DL (ref 70–99)
GLUCOSE URINE: NEGATIVE
HCT VFR BLD CALC: 40.9 % (ref 40.7–50.3)
HEMOGLOBIN: 13.1 G/DL (ref 13–17)
KETONES, URINE: NEGATIVE
LEUKOCYTE ESTERASE, URINE: NEGATIVE
MUCUS: NORMAL
NITRITE, URINE: NEGATIVE
OTHER OBSERVATIONS UA: NORMAL
PH UA: 6.5 (ref 5–8)
POTASSIUM SERPL-SCNC: 4 MMOL/L (ref 3.7–5.3)
PROTEIN UA: NEGATIVE
PTH INTACT: 134.7 PG/ML (ref 15–65)
RBC UA: NORMAL /HPF (ref 0–4)
RENAL EPITHELIAL, UA: NORMAL /HPF
SODIUM BLD-SCNC: 139 MMOL/L (ref 135–144)
SPECIFIC GRAVITY UA: 1.01 (ref 1–1.03)
TOTAL PROTEIN, URINE: 8 MG/DL
TOTAL PROTEIN: 6.9 G/DL (ref 6.4–8.3)
TRICHOMONAS: NORMAL
TURBIDITY: CLEAR
URINE HGB: ABNORMAL
URINE TOTAL PROTEIN CREATININE RATIO: 0.16 (ref 0–0.2)
UROBILINOGEN, URINE: NORMAL
VITAMIN D 25-HYDROXY: 45.4 NG/ML (ref 30–100)
WBC UA: NORMAL /HPF (ref 0–5)
YEAST: NORMAL

## 2021-12-14 ENCOUNTER — HOSPITAL ENCOUNTER (OUTPATIENT)
Age: 86
Setting detail: SPECIMEN
Discharge: HOME | End: 2021-12-14

## 2021-12-14 LAB
ANION GAP SERPL CALCULATED.3IONS-SCNC: 12 MMOL/L (ref 9–17)
BUN BLDV-MCNC: 54 MG/DL (ref 8–23)
BUN/CREAT BLD: ABNORMAL (ref 9–20)
CALCIUM SERPL-MCNC: 9.9 MG/DL (ref 8.6–10.4)
CHLORIDE BLD-SCNC: 100 MMOL/L (ref 98–107)
CO2: 27 MMOL/L (ref 20–31)
CREAT SERPL-MCNC: 2.92 MG/DL (ref 0.7–1.2)
GFR AFRICAN AMERICAN: 25 ML/MIN
GFR NON-AFRICAN AMERICAN: 20 ML/MIN
GFR SERPL CREATININE-BSD FRML MDRD: ABNORMAL ML/MIN/{1.73_M2}
GFR SERPL CREATININE-BSD FRML MDRD: ABNORMAL ML/MIN/{1.73_M2}
GLUCOSE BLD-MCNC: 89 MG/DL (ref 70–99)
POTASSIUM SERPL-SCNC: 4.1 MMOL/L (ref 3.7–5.3)
SODIUM BLD-SCNC: 139 MMOL/L (ref 135–144)

## 2021-12-30 ENCOUNTER — HOSPITAL ENCOUNTER (OUTPATIENT)
Dept: GENERAL RADIOLOGY | Age: 86
Discharge: HOME | End: 2022-01-01
Payer: MEDICARE

## 2021-12-30 ENCOUNTER — HOSPITAL ENCOUNTER (OUTPATIENT)
Age: 86
Discharge: HOME | End: 2022-01-01
Payer: MEDICARE

## 2021-12-30 ENCOUNTER — OFFICE VISIT (OUTPATIENT)
Dept: INTERNAL MEDICINE CLINIC | Age: 86
End: 2021-12-30
Payer: MEDICARE

## 2021-12-30 VITALS
SYSTOLIC BLOOD PRESSURE: 116 MMHG | WEIGHT: 161 LBS | HEART RATE: 79 BPM | OXYGEN SATURATION: 96 % | TEMPERATURE: 97.2 F | BODY MASS INDEX: 23.78 KG/M2 | DIASTOLIC BLOOD PRESSURE: 64 MMHG

## 2021-12-30 DIAGNOSIS — K59.09 OTHER CONSTIPATION: ICD-10-CM

## 2021-12-30 DIAGNOSIS — K59.09 OTHER CONSTIPATION: Primary | ICD-10-CM

## 2021-12-30 PROCEDURE — 99213 OFFICE O/P EST LOW 20 MIN: CPT | Performed by: INTERNAL MEDICINE

## 2021-12-30 PROCEDURE — 74018 RADEX ABDOMEN 1 VIEW: CPT

## 2021-12-30 RX ORDER — LACTULOSE 10 G/15ML
10 SOLUTION ORAL EVERY EVENING
Qty: 300 ML | Refills: 0 | Status: SHIPPED | OUTPATIENT
Start: 2021-12-30 | End: 2022-03-14

## 2021-12-30 NOTE — PROGRESS NOTES
Zully Nogueira is a 80 y.o. male who presents for   Chief Complaint   Patient presents with    Constipation     here for same day appointment for constipation, using stool softner but not helping    and follow up of chronic medical problems.   Patient Active Problem List   Diagnosis    Sleep apnea    Prostate cancer ([facility] [address]    CRF (chronic renal failure)    OA (osteoarthritis) of knee    Hypercholesteremia    Impotence    HTN (hypertension)    Closed fracture of shaft of metacarpal bone    Primary osteoarthritis of right hip    Peripheral vascular disease (HCC)    Chronic kidney disease, stage IV (severe) (HCC)     HPI  Here for evaluation of constipation going on for a long time and medication sometimes helps and denies any nausea or vomiting and no loss of appetite last bowel movement 2 days ago    Current Outpatient Medications   Medication Sig Dispense Refill    lactulose (CHRONULAC) 10 GM/15ML solution Take 15 mLs by mouth every evening 300 mL 0    polyethylene glycol (MIRALAX) 17 g PACK packet Take 17 g by mouth daily 100 each 0    Nutritional Supplements (NUTRITIONAL DRINK) LIQD Take 1 each by mouth 2 times daily 1 can twice a day 237 mL 5    spironolactone (ALDACTONE) 25 MG tablet Take 1 tablet by mouth daily       bisoprolol (ZEBETA) 5 MG tablet Take 5 mg by mouth daily      bumetanide (BUMEX) 2 MG tablet take 1 tablet by mouth twice a day      docusate sodium (COLACE) 100 MG capsule Take 1 capsule by mouth daily as needed for Constipation 30 capsule 1    apixaban (ELIQUIS) 2.5 MG TABS tablet Take 1 tablet by mouth 2 times daily 7 tablet 0    famotidine (PEPCID) 20 MG tablet Take 1 tablet by mouth daily 7 tablet 0    rosuvastatin (CRESTOR) 40 MG tablet Take 1 tablet by mouth every evening 90 tablet 3    aspirin 81 MG tablet Take 1 tablet by mouth daily (Patient taking differently: Take 81 mg by mouth three times a week ) 30 tablet 2     No current facility-administered medications for this visit.        Allergies   Allergen Reactions    Ace Inhibitors     Other      Beta-blockers slow heart rate    Timolol      Eye drops slow heart rate       Past Medical History:   Diagnosis Date    BPH (benign prostatic hyperplasia)     secondary to reynolds    CAD (coronary artery disease) 1/18/2005    CRF (chronic renal failure) 01/01    patient denies kidney problems at pre-testing 2015    DVD (dissociated vertical deviation)     Gout     H/O cardiac catheterization 1/18/05    stents     HTN (hypertension)     Hx of blood clots     left leg (over 23 yrs ago)    Hypercholesteremia     Impotence     Irregular heartbeat     Nodular prostate     -bx Dr. Genet Valle 8/96 and focal inflammation 6/97    OA (osteoarthritis) of knee     Prostate cancer ([facility] [address] 1/06    Unspecified sleep apnea     no machine       Past Surgical History:   Procedure Laterality Date    CORONARY ANGIOPLASTY      CYSTOPLASTY N/A     urethral dilation 10/93 Dr Carito Figueroa Left     cataract    HAND SURGERY Right 1/08/15    Dr. Nell Mehta did right ring and little finger metacarpophalangeal joint extension    JOINT REPLACEMENT      PROSTATE SURGERY      seed implant    REVISION TOTAL HIP ARTHROPLASTY Left 05/07    Dr Spike Lorenz Right 10/20/2015       Family History   Problem Relation Age of Onset    Coronary Art Dis Mother     Hypertension Mother     Cancer Brother      ROS   Constitutional:  Negative for fatigue, loss of appetite and unexpected weight change   HEENT            : Negative for neck stiffness and pain, no congestion or sinus pressure   Eyes                : No visual disturbance or pain   Cardiovascular: No chest pain or palpitations or leg swelling   Respiratory      : Negative for cough, shortness of breath or wheezing   Gastrointestinal: Negative for abdominal pain, positive for constipation but no diarrhea and bloating No nausea or vomiting   Genitourinary: HGB 13.1 11/16/2021    HCT 40.9 11/16/2021    MCV 85.7 04/28/2021     04/28/2021     Lab Results   Component Value Date    INR 1.2 05/12/2020    PROTIME 12.1 (H) 05/12/2020     Lab Results   Component Value Date    GLUCOSE 89 12/14/2021    CREATININE 2.92 (H) 12/14/2021    BUN 54 (H) 12/14/2021     12/14/2021    K 4.1 12/14/2021     12/14/2021    CO2 27 12/14/2021     Lab Results   Component Value Date    ALT 16 11/16/2021    AST 33 11/16/2021    ALKPHOS 97 11/16/2021    BILITOT 0.40 11/16/2021     Lab Results   Component Value Date    LABPROT 5.3 (L) 11/18/2012    LABALBU 3.9 11/16/2021     Lab Results   Component Value Date    TSH 7.22 (H) 04/28/2021     Assessment:  1. Other constipation        Plan:  Patient had good bowel sounds and soft abdomen and most probably related to pelvic floor muscle weakness and patient had tried the MiraLAX and the Dulcolax and MiraLAX did not help him but Dulcolax does help him but when he stops he is getting the constipation again and so an x-ray of the abdomen ordered to evaluate for any obstruction and also patient is started on lactulose 15 mL daily for constipation and advised him to call me back week to 10 days  Review as scheduled           1. Rachael Edge received counseling on the following healthy behaviors: nutrition and exercise    2. Prior labs and health maintenance reviewed. 3.  Discussed use, benefit, and side effects of prescribed medications. Barriers to medication compliance addressed. All his questions were answered. Pt voiced understanding. Rachael Edge will continue current medications, diet and exercise.               Orders Placed This Encounter   Medications    lactulose (CHRONULAC) 10 GM/15ML solution     Sig: Take 15 mLs by mouth every evening     Dispense:  300 mL     Refill:  0          Completed Refills               Requested Prescriptions     Signed Prescriptions Disp Refills    lactulose (CHRONULAC) 10 GM/15ML solution 300 mL 0 Sig: Take 15 mLs by mouth every evening     4. Patient given educational materials - see patient instructions    5. Was a self-tracking handout given in paper form or via [employer]? NO    Orders Placed This Encounter   Procedures    XR ABDOMEN (KUB) (SINGLE AP VIEW)     Standing Status:   Future     Standing Expiration Date:   12/30/2022     No follow-ups on file. Patient voiced understanding and agreed to treatment plan. Electronically signed by Jesus Bateman MD on 12/30/2021 at 1:05 PM    This note is created with a voice recognition program and while intend to generate a document that accurately reflects the content of the visit, no guarantee can be provided that every mistake has been identified and corrected by editing.

## 2022-01-18 ENCOUNTER — HOSPITAL ENCOUNTER (OUTPATIENT)
Age: 87
Setting detail: SPECIMEN
Discharge: HOME | End: 2022-01-18

## 2022-01-18 LAB
ANION GAP SERPL CALCULATED.3IONS-SCNC: 14 MMOL/L (ref 9–17)
BUN BLDV-MCNC: 46 MG/DL (ref 8–23)
BUN/CREAT BLD: ABNORMAL (ref 9–20)
CALCIUM SERPL-MCNC: 11.2 MG/DL (ref 8.6–10.4)
CHLORIDE BLD-SCNC: 103 MMOL/L (ref 98–107)
CO2: 26 MMOL/L (ref 20–31)
CREAT SERPL-MCNC: 2.81 MG/DL (ref 0.7–1.2)
GFR AFRICAN AMERICAN: 26 ML/MIN
GFR NON-AFRICAN AMERICAN: 21 ML/MIN
GFR SERPL CREATININE-BSD FRML MDRD: ABNORMAL ML/MIN/{1.73_M2}
GFR SERPL CREATININE-BSD FRML MDRD: ABNORMAL ML/MIN/{1.73_M2}
GLUCOSE BLD-MCNC: 105 MG/DL (ref 70–99)
POTASSIUM SERPL-SCNC: 4 MMOL/L (ref 3.7–5.3)
SODIUM BLD-SCNC: 143 MMOL/L (ref 135–144)

## 2022-02-09 ENCOUNTER — HOSPITAL ENCOUNTER (OUTPATIENT)
Age: 87
Setting detail: SPECIMEN
Discharge: HOME | End: 2022-02-09

## 2022-02-09 LAB
-: NORMAL
ALBUMIN SERPL-MCNC: 3.9 G/DL (ref 3.5–5.2)
ALBUMIN/GLOBULIN RATIO: 1.4 (ref 1–2.5)
ALP BLD-CCNC: 97 U/L (ref 40–129)
ALT SERPL-CCNC: 21 U/L (ref 5–41)
AMORPHOUS: NORMAL
ANION GAP SERPL CALCULATED.3IONS-SCNC: 15 MMOL/L (ref 9–17)
AST SERPL-CCNC: 36 U/L
BACTERIA: NORMAL
BILIRUB SERPL-MCNC: 0.41 MG/DL (ref 0.3–1.2)
BILIRUBIN URINE: NEGATIVE
BUN BLDV-MCNC: 46 MG/DL (ref 8–23)
BUN/CREAT BLD: ABNORMAL (ref 9–20)
CALCIUM SERPL-MCNC: 9.7 MG/DL (ref 8.6–10.4)
CASTS UA: NORMAL /LPF (ref 0–8)
CHLORIDE BLD-SCNC: 104 MMOL/L (ref 98–107)
CO2: 24 MMOL/L (ref 20–31)
COLOR: YELLOW
COMMENT UA: ABNORMAL
CREAT SERPL-MCNC: 3 MG/DL (ref 0.7–1.2)
CREATININE URINE: 137.9 MG/DL (ref 39–259)
CRYSTALS, UA: NORMAL /HPF
EPITHELIAL CELLS UA: NORMAL /HPF (ref 0–5)
GFR AFRICAN AMERICAN: 24 ML/MIN
GFR NON-AFRICAN AMERICAN: 20 ML/MIN
GFR SERPL CREATININE-BSD FRML MDRD: ABNORMAL ML/MIN/{1.73_M2}
GFR SERPL CREATININE-BSD FRML MDRD: ABNORMAL ML/MIN/{1.73_M2}
GLUCOSE BLD-MCNC: 88 MG/DL (ref 70–99)
GLUCOSE URINE: NEGATIVE
HCT VFR BLD CALC: 42.6 % (ref 40.7–50.3)
HEMOGLOBIN: 13.2 G/DL (ref 13–17)
KETONES, URINE: NEGATIVE
LEUKOCYTE ESTERASE, URINE: NEGATIVE
MUCUS: NORMAL
NITRITE, URINE: NEGATIVE
OTHER OBSERVATIONS UA: NORMAL
PH UA: 6 (ref 5–8)
POTASSIUM SERPL-SCNC: 4.3 MMOL/L (ref 3.7–5.3)
PROTEIN UA: ABNORMAL
PTH INTACT: 118.1 PG/ML (ref 15–65)
RBC UA: NORMAL /HPF (ref 0–4)
RENAL EPITHELIAL, UA: NORMAL /HPF
SODIUM BLD-SCNC: 143 MMOL/L (ref 135–144)
SPECIFIC GRAVITY UA: 1.01 (ref 1–1.03)
TOTAL PROTEIN, URINE: 29 MG/DL
TOTAL PROTEIN: 6.7 G/DL (ref 6.4–8.3)
TRICHOMONAS: NORMAL
TURBIDITY: CLEAR
URINE HGB: ABNORMAL
URINE TOTAL PROTEIN CREATININE RATIO: 0.21 (ref 0–0.2)
UROBILINOGEN, URINE: NORMAL
VITAMIN D 25-HYDROXY: 52.7 NG/ML (ref 30–100)
WBC UA: NORMAL /HPF (ref 0–5)
YEAST: NORMAL

## 2022-03-14 ENCOUNTER — OFFICE VISIT (OUTPATIENT)
Dept: INTERNAL MEDICINE CLINIC | Age: 87
End: 2022-03-14
Payer: MEDICARE

## 2022-03-14 VITALS
SYSTOLIC BLOOD PRESSURE: 116 MMHG | WEIGHT: 164.4 LBS | HEART RATE: 74 BPM | DIASTOLIC BLOOD PRESSURE: 68 MMHG | HEIGHT: 69 IN | OXYGEN SATURATION: 98 % | BODY MASS INDEX: 24.35 KG/M2 | TEMPERATURE: 97.2 F | RESPIRATION RATE: 18 BRPM

## 2022-03-14 DIAGNOSIS — C61 PROSTATE CANCER (HCC): ICD-10-CM

## 2022-03-14 DIAGNOSIS — I10 ESSENTIAL HYPERTENSION: ICD-10-CM

## 2022-03-14 DIAGNOSIS — K59.04 CHRONIC IDIOPATHIC CONSTIPATION: Primary | ICD-10-CM

## 2022-03-14 DIAGNOSIS — I73.9 PERIPHERAL VASCULAR DISEASE (HCC): ICD-10-CM

## 2022-03-14 DIAGNOSIS — N18.4 CHRONIC KIDNEY DISEASE, STAGE IV (SEVERE) (HCC): ICD-10-CM

## 2022-03-14 DIAGNOSIS — I50.9 CONGESTIVE HEART FAILURE, UNSPECIFIED HF CHRONICITY, UNSPECIFIED HEART FAILURE TYPE (HCC): ICD-10-CM

## 2022-03-14 PROCEDURE — 99214 OFFICE O/P EST MOD 30 MIN: CPT | Performed by: INTERNAL MEDICINE

## 2022-03-14 RX ORDER — PLECANATIDE 3 MG/1
3 TABLET ORAL DAILY
Qty: 9 TABLET | Refills: 0 | COMMUNITY
Start: 2022-03-14 | End: 2022-04-07 | Stop reason: SDUPTHER

## 2022-03-14 ASSESSMENT — PATIENT HEALTH QUESTIONNAIRE - PHQ9
SUM OF ALL RESPONSES TO PHQ QUESTIONS 1-9: 0
SUM OF ALL RESPONSES TO PHQ QUESTIONS 1-9: 0
2. FEELING DOWN, DEPRESSED OR HOPELESS: 0
1. LITTLE INTEREST OR PLEASURE IN DOING THINGS: 0
SUM OF ALL RESPONSES TO PHQ9 QUESTIONS 1 & 2: 0
SUM OF ALL RESPONSES TO PHQ QUESTIONS 1-9: 0
SUM OF ALL RESPONSES TO PHQ QUESTIONS 1-9: 0

## 2022-03-14 NOTE — PROGRESS NOTES
Beta-blockers slow heart rate    Timolol      Eye drops slow heart rate       Past Medical History:   Diagnosis Date    BPH (benign prostatic hyperplasia)     secondary to reynolds    CAD (coronary artery disease) 1/18/2005    CRF (chronic renal failure) 01/01    patient denies kidney problems at pre-testing 2015    DVD (dissociated vertical deviation)     Gout     H/O cardiac catheterization 1/18/05    stents     HTN (hypertension)     Hx of blood clots     left leg (over 23 yrs ago)    Hypercholesteremia     Impotence     Irregular heartbeat     Nodular prostate     -bx Dr. Crista Hayes 8/96 and focal inflammation 6/97    OA (osteoarthritis) of knee     Prostate cancer ([facility] [address] 1/06    Unspecified sleep apnea     no machine       Past Surgical History:   Procedure Laterality Date    CORONARY ANGIOPLASTY      CYSTOPLASTY N/A     urethral dilation 10/93 Dr Paz Dubose Left     cataract    HAND SURGERY Right 1/08/15    Dr. Belen Neely did right ring and little finger metacarpophalangeal joint extension    JOINT REPLACEMENT      PROSTATE SURGERY      seed implant    REVISION TOTAL HIP ARTHROPLASTY Left 05/07    Dr Amos Lake Right 10/20/2015       Family History   Problem Relation Age of Onset    Coronary Art Dis Mother     Hypertension Mother     Cancer Brother      ROS   Constitutional:  Negative for fatigue, loss of appetite and unexpected weight change   HEENT            : Negative for neck stiffness and pain, no congestion or sinus pressure   Eyes                : No visual disturbance or pain   Cardiovascular: No chest pain or palpitations or leg swelling   Respiratory      : Negative for cough, shortness of breath or wheezing   Gastrointestinal: Negative for abdominal pain, positive for constipation but no diarrhea and bloating No nausea or vomiting   Genitourinary:     No urgency or frequency, no burning or hematuria   Musculoskeletal: No arthralgias, back pain or myalgias   Skin                  : Negative for rash or erythema   Neurological    : Negative for dizziness, weakness, tremors ,light headedness or syncope   Psychiatric       : Negative for dysphoric mood, sleep disturbances, nervous or anxious, or decreased concentration   All other review of systems was negative    Objective  Physical Examination:    Nursing note reviewed    /68 (Site: Left Upper Arm, Position: Sitting, Cuff Size: Large Adult)   Pulse 74   Temp 97.2 °F (36.2 °C) (Temporal)   Resp 18   Ht 5' 9.02\" (1.753 m)   Wt 164 lb 6.4 oz (74.6 kg)   SpO2 98%   BMI 24.27 kg/m²   BP Readings from Last 3 Encounters:   03/14/22 116/68   12/30/21 116/64   10/18/21 120/60         Constitutional:  Robbin Chiu is oriented to place, person and time ,appears well-developed and well-nourished  HEENT:  Atraumatic and normocephalic, external ears normal bilaterally, nose normal no oropharyngeal exudate and is clear and moist  Eyes:  EOCM normal; conjunctivae normal; PERRLA bilaterally  Neck:  Normal range of motion, neck supple, no JVD and no thyromegaly  Cardiovascular:  RRR, normal heart sounds and intact distal pulses  Pulmonary:  effort normal and breath sounds normal bilaterally,no wheezes or rales, no respiratory distress  Abdominal:  Soft, non-tender; normal bowel sounds, no masses  Musculoskeletal:  Normal range of motion and no edema or tenderness bilaterally  No lymphadenopathy  Neurological:  alert, oriented, and normal reflexes bilaterally  Skin: warm and dry  Psychiatric:  normal mood and effect; behavior normal.    Labs:   Lab Results   Component Value Date    LABA1C 5.8 02/10/2021     Lab Results   Component Value Date    CHOL 149 11/06/2020     Lab Results   Component Value Date    HDL 58 04/28/2021     Lab Results   Component Value Date    LDLCALC 64 05/16/2016     Lab Results   Component Value Date    TRIG 32 11/06/2020     No components found for: CHOLHDL  Lab Results   Component Value Date    WBC 4.9 04/28/2021    HGB 13.2 02/09/2022    HCT 42.6 02/09/2022    MCV 85.7 04/28/2021     04/28/2021     Lab Results   Component Value Date    INR 1.2 05/12/2020    PROTIME 12.1 (H) 05/12/2020     Lab Results   Component Value Date    GLUCOSE 88 02/09/2022    CREATININE 3.00 (H) 02/09/2022    BUN 46 (H) 02/09/2022     02/09/2022    K 4.3 02/09/2022     02/09/2022    CO2 24 02/09/2022     Lab Results   Component Value Date    ALT 21 02/09/2022    AST 36 02/09/2022    ALKPHOS 97 02/09/2022    BILITOT 0.41 02/09/2022     Lab Results   Component Value Date    LABPROT 5.3 (L) 11/18/2012    LABALBU 3.9 02/09/2022     Lab Results   Component Value Date    TSH 7.22 (H) 04/28/2021     Assessment:  1. Chronic idiopathic constipation    2. Congestive heart failure, unspecified HF chronicity, unspecified heart failure type ([address]    3. Chronic kidney disease, stage IV (severe) ([address]    4. Peripheral vascular disease ([facility] [address]    5. Prostate cancer ([facility] [address]    6. Essential hypertension        Plan:  Patient's constipation still bothering and taking only Metamucil but not helping him and we will try Trulance 3 mg daily in office samples given for 9 days and advised him to call me back next week and also advised him about side effects including diarrhea and advised to continue Metamucil for now and if starts having improvement can stop the Metamucil  Patient is following with cardiology for his congestive heart failure and seeing the nephrologist for his kidney disease  Blood pressure is stable on current medications  Review in 6 months           1. Bianka Clark received counseling on the following healthy behaviors: nutrition and exercise    2. Prior labs and health maintenance reviewed. 3.  Discussed use, benefit, and side effects of prescribed medications. Barriers to medication compliance addressed. All his questions were answered. Pt voiced understanding.    Bianka Clark will continue current medications, diet and exercise. No orders of the defined types were placed in this encounter. Completed Refills               Requested Prescriptions      No prescriptions requested or ordered in this encounter     4. Patient given educational materials - see patient instructions    5. Was a self-tracking handout given in paper form or via [employer]? NO    No orders of the defined types were placed in this encounter. No follow-ups on file. Patient voiced understanding and agreed to treatment plan. Electronically signed by Charlyne Kussmaul, MD on 3/14/2022 at 11:12 AM    This note is created with a voice recognition program and while intend to generate a document that accurately reflects the content of the visit, no guarantee can be provided that every mistake has been identified and corrected by editing.

## 2022-03-23 ENCOUNTER — TELEPHONE (OUTPATIENT)
Dept: INTERNAL MEDICINE CLINIC | Age: 87
End: 2022-03-23

## 2022-03-23 RX ORDER — PLECANATIDE 3 MG/1
3 TABLET ORAL DAILY
Qty: 30 TABLET | Refills: 2 | Status: SHIPPED | OUTPATIENT
Start: 2022-03-23 | End: 2022-04-07 | Stop reason: SDUPTHER

## 2022-03-23 NOTE — TELEPHONE ENCOUNTER
Patient calling stating that the sample of trulance is helping and he would like a RX sent to [facility]    Please advise

## 2022-03-28 ENCOUNTER — TELEPHONE (OUTPATIENT)
Dept: INTERNAL MEDICINE CLINIC | Age: 87
End: 2022-03-28

## 2022-03-28 DIAGNOSIS — K59.04 CHRONIC IDIOPATHIC CONSTIPATION: Primary | ICD-10-CM

## 2022-03-30 RX ORDER — LUBIPROSTONE 24 UG/1
24 CAPSULE, GELATIN COATED ORAL 2 TIMES DAILY WITH MEALS
Qty: 60 CAPSULE | Refills: 1 | Status: SHIPPED | OUTPATIENT
Start: 2022-03-30 | End: 2022-04-07 | Stop reason: SDUPTHER

## 2022-03-30 RX ORDER — LUBIPROSTONE 8 UG/1
8 CAPSULE, GELATIN COATED ORAL DAILY
Qty: 30 CAPSULE | Refills: 3 | Status: CANCELLED | OUTPATIENT
Start: 2022-03-30

## 2022-04-07 RX ORDER — LUBIPROSTONE 24 UG/1
24 CAPSULE, GELATIN COATED ORAL 2 TIMES DAILY WITH MEALS
Qty: 60 CAPSULE | Refills: 3 | Status: SHIPPED | OUTPATIENT
Start: 2022-04-07 | End: 2022-04-22 | Stop reason: SDUPTHER

## 2022-04-07 NOTE — TELEPHONE ENCOUNTER
Per Cover my meds no Pa required for Brand Amitiza   Prescription resent unable to verify with phamable   Per [facility] has not received electronic prescription as of 9:30a  Will try back

## 2022-04-22 ENCOUNTER — TELEPHONE (OUTPATIENT)
Dept: INTERNAL MEDICINE CLINIC | Age: 87
End: 2022-04-22

## 2022-04-22 DIAGNOSIS — K59.04 CHRONIC IDIOPATHIC CONSTIPATION: ICD-10-CM

## 2022-04-22 RX ORDER — LUBIPROSTONE 24 UG/1
24 CAPSULE, GELATIN COATED ORAL 2 TIMES DAILY WITH MEALS
Qty: 60 CAPSULE | Refills: 1 | Status: SHIPPED | OUTPATIENT
Start: 2022-04-22 | End: 2022-06-28 | Stop reason: SDUPTHER

## 2022-04-22 NOTE — TELEPHONE ENCOUNTER
Patient is asking if it is ok to take metamucil  with the amitiza?     Knows the doctor is out of the office until Monday    Please advise

## 2022-05-06 RX ORDER — SPIRONOLACTONE 25 MG/1
25 TABLET ORAL DAILY
Qty: 90 TABLET | Refills: 1 | Status: SHIPPED | OUTPATIENT
Start: 2022-05-06

## 2022-05-06 NOTE — TELEPHONE ENCOUNTER
Meek Barnes is calling to request a refill on the following medication(s):    Medication Request:    Noted as historic med 4/29/21      Requested Prescriptions     Pending Prescriptions Disp Refills    spironolactone (ALDACTONE) 25 MG tablet 30 tablet      Sig: Take 1 tablet by mouth daily       Last Visit Date (If Applicable):  7/19/5702    Next Visit Date:    9/15/2022

## 2022-06-24 ENCOUNTER — TELEPHONE (OUTPATIENT)
Dept: INTERNAL MEDICINE CLINIC | Age: 87
End: 2022-06-24

## 2022-06-24 RX ORDER — CLINDAMYCIN HYDROCHLORIDE 300 MG/1
300 CAPSULE ORAL 3 TIMES DAILY
Qty: 15 CAPSULE | Refills: 0 | Status: SHIPPED | OUTPATIENT
Start: 2022-06-24 | End: 2022-06-29

## 2022-06-24 NOTE — TELEPHONE ENCOUNTER
Patient calling asking for a script for clindamycin 300 mg   States he is getting the same symptoms as he had when he went to the [facility]) in February (notes in care everywhere)- (earache and facial pain)

## 2022-06-28 ENCOUNTER — OFFICE VISIT (OUTPATIENT)
Dept: INTERNAL MEDICINE CLINIC | Age: 87
End: 2022-06-28
Payer: MEDICARE

## 2022-06-28 VITALS
RESPIRATION RATE: 15 BRPM | OXYGEN SATURATION: 97 % | BODY MASS INDEX: 24.76 KG/M2 | HEART RATE: 52 BPM | WEIGHT: 167.2 LBS | HEIGHT: 69 IN | DIASTOLIC BLOOD PRESSURE: 62 MMHG | SYSTOLIC BLOOD PRESSURE: 122 MMHG | TEMPERATURE: 97 F

## 2022-06-28 DIAGNOSIS — M25.551 RIGHT HIP PAIN: ICD-10-CM

## 2022-06-28 DIAGNOSIS — H92.02 LEFT EAR PAIN: Primary | ICD-10-CM

## 2022-06-28 DIAGNOSIS — K59.04 CHRONIC IDIOPATHIC CONSTIPATION: ICD-10-CM

## 2022-06-28 PROCEDURE — 99214 OFFICE O/P EST MOD 30 MIN: CPT | Performed by: INTERNAL MEDICINE

## 2022-06-28 PROCEDURE — 1123F ACP DISCUSS/DSCN MKR DOCD: CPT | Performed by: INTERNAL MEDICINE

## 2022-06-28 RX ORDER — LUBIPROSTONE 24 UG/1
24 CAPSULE, GELATIN COATED ORAL 2 TIMES DAILY WITH MEALS
Qty: 180 CAPSULE | Refills: 1 | Status: SHIPPED | OUTPATIENT
Start: 2022-06-28

## 2022-06-28 RX ORDER — PREDNISONE 1 MG/1
5 TABLET ORAL 2 TIMES DAILY
Qty: 10 TABLET | Refills: 0 | Status: SHIPPED | OUTPATIENT
Start: 2022-06-28 | End: 2022-07-03

## 2022-06-28 NOTE — PROGRESS NOTES
Lavinia Zarco is a 80 y.o. male who presents for   Chief Complaint   Patient presents with    Facial Pain     patient states he is no longer having the facial or ear pain    Health Maintenance     tdap, shingels, psa, lipids    and follow up of chronic medical problems.   Patient Active Problem List   Diagnosis    Sleep apnea    Prostate cancer ([facility] [address]    CRF (chronic renal failure)    OA (osteoarthritis) of knee    Hypercholesteremia    Impotence    HTN (hypertension)    Closed fracture of shaft of metacarpal bone    Primary osteoarthritis of right hip    Peripheral vascular disease (HCC)    Chronic kidney disease, stage IV (severe) (HCC)    Congestive heart failure, unspecified HF chronicity, unspecified heart failure type (HCC)     HPI  Here for follow-up and last week called for left ear and jaw pain which patient says was given antibiotics clindamycin last time and was improved when he was in the ER and he requested the same which was given last week and patient states his symptoms are improved and also wants to discuss about his constipation which is improved and also right hip pain going on more than a month    Current Outpatient Medications   Medication Sig Dispense Refill    AMITIZA 24 MCG capsule Take 1 capsule by mouth 2 times daily (with meals) 180 capsule 1    predniSONE (DELTASONE) 5 MG tablet Take 1 tablet by mouth 2 times daily for 5 days 10 tablet 0    clindamycin (CLEOCIN) 300 MG capsule Take 1 capsule by mouth 3 times daily for 5 days 15 capsule 0    spironolactone (ALDACTONE) 25 MG tablet Take 1 tablet by mouth daily 90 tablet 1    Nutritional Supplements (NUTRITIONAL DRINK) LIQD Take 1 each by mouth 2 times daily 1 can twice a day 237 mL 5    bisoprolol (ZEBETA) 5 MG tablet Take 5 mg by mouth daily      bumetanide (BUMEX) 2 MG tablet take 1 tablet by mouth twice a day      apixaban (ELIQUIS) 2.5 MG TABS tablet Take 1 tablet by mouth 2 times daily 7 tablet 0    famotidine pressure   Eyes                : No visual disturbance or pain   Cardiovascular: No chest pain or palpitations or leg swelling   Respiratory      : Negative for cough, shortness of breath or wheezing   Gastrointestinal: Negative for abdominal pain, constipation or diarrhea and bloating No nausea or vomiting   Genitourinary:     No urgency or frequency, no burning or hematuria   Musculoskeletal: Positive for arthralgias, back pain or myalgias   Skin                  : Negative for rash or erythema   Neurological    : Negative for dizziness, weakness, tremors ,light headedness or syncope   Psychiatric       : Negative for dysphoric mood, sleep disturbances, nervous or anxious, or decreased concentration   All other review of systems was negative    Objective  Physical Examination:    Nursing note reviewed    /62 (Site: Right Upper Arm, Position: Sitting, Cuff Size: Medium Adult)   Pulse 52   Temp 97 °F (36.1 °C) (Temporal)   Resp 15   Ht 5' 9.02\" (1.753 m)   Wt 167 lb 3.2 oz (75.8 kg)   SpO2 97%   BMI 24.68 kg/m²   BP Readings from Last 3 Encounters:   06/28/22 122/62   03/14/22 116/68   12/30/21 116/64         Constitutional:  Tiffanie Arce is oriented to place, person and time ,appears well-developed and well-nourished  HEENT:  Atraumatic and normocephalic, external ears normal bilaterally, nose normal no oropharyngeal exudate and is clear and moist  Eyes:  EOCM normal; conjunctivae normal; PERRLA bilaterally  Neck:  Normal range of motion, neck supple, no JVD and no thyromegaly  Cardiovascular:  RRR, normal heart sounds and intact distal pulses  Pulmonary:  effort normal and breath sounds normal bilaterally,no wheezes or rales, no respiratory distress  Abdominal:  Soft, non-tender; normal bowel sounds, no masses  Musculoskeletal: Limited range of motion and no edema or tenderness bilaterally  No lymphadenopathy  Neurological:  alert, oriented, and normal reflexes bilaterally  Skin: warm and dry  Psychiatric:  normal mood and effect; behavior normal.    Labs:   Lab Results   Component Value Date    LABA1C 5.8 02/10/2021     Lab Results   Component Value Date    CHOL 149 11/06/2020     Lab Results   Component Value Date    HDL 58 04/28/2021     Lab Results   Component Value Date    LDLCALC 64 05/16/2016     Lab Results   Component Value Date    TRIG 32 11/06/2020     No results found for: [place], [place]  Lab Results   Component Value Date    WBC 4.9 04/28/2021    HGB 13.2 02/09/2022    HCT 42.6 02/09/2022    MCV 85.7 04/28/2021     04/28/2021     Lab Results   Component Value Date    INR 1.2 05/12/2020    PROTIME 12.1 (H) 05/12/2020     Lab Results   Component Value Date    GLUCOSE 88 02/09/2022    CREATININE 3.00 (H) 02/09/2022    BUN 46 (H) 02/09/2022     02/09/2022    K 4.3 02/09/2022     02/09/2022    CO2 24 02/09/2022     Lab Results   Component Value Date    ALT 21 02/09/2022    AST 36 02/09/2022    ALKPHOS 97 02/09/2022    BILITOT 0.41 02/09/2022     Lab Results   Component Value Date    LABPROT 5.3 (L) 11/18/2012    LABALBU 3.9 02/09/2022     Lab Results   Component Value Date    TSH 7.22 (H) 04/28/2021     Assessment:  1. Left ear pain    2. Chronic idiopathic constipation    3. Right hip pain        Plan:  Patient's pain improved and left ear feeling normal and examination did not show any acute abnormality and patient completed the antibiotics and have 1 more day to go and patient advised to follow-up with his dentist  Patient's constipation is improved with Amitiza and so new prescriptions given for 6 months and continue same  Patient complaining of right-sided hip and low back pain particularly when sitting for long time and walkin and advised patient to try Voltaren gel and office sample given and also prednisone 5 mg twice a day for 5 days given to the patient and advised him to call me back next week  Review in 6 months           Justa Johnston received counseling on the following healthy behaviors: nutrition and exercise    2. Prior labs and health maintenance reviewed. 3.  Discussed use, benefit, and side effects of prescribed medications. Barriers to medication compliance addressed. All his questions were answered. Pt voiced understanding. Madelaine Severe will continue current medications, diet and exercise. Orders Placed This Encounter   Medications    AMITIZA 24 MCG capsule     Sig: Take 1 capsule by mouth 2 times daily (with meals)     Dispense:  180 capsule     Refill:  1    predniSONE (DELTASONE) 5 MG tablet     Sig: Take 1 tablet by mouth 2 times daily for 5 days     Dispense:  10 tablet     Refill:  0          Completed Refills               Requested Prescriptions     Signed Prescriptions Disp Refills    AMITIZA 24 MCG capsule 180 capsule 1     Sig: Take 1 capsule by mouth 2 times daily (with meals)    predniSONE (DELTASONE) 5 MG tablet 10 tablet 0     Sig: Take 1 tablet by mouth 2 times daily for 5 days     4. Patient given educational materials - see patient instructions    5. Was a self-tracking handout given in paper form or via [employer]? NO    No orders of the defined types were placed in this encounter. No follow-ups on file. Patient voiced understanding and agreed to treatment plan. Electronically signed by Neisha Moore MD on 6/28/2022 at 12:24 PM    This note is created with a voice recognition program and while intend to generate a document that accurately reflects the content of the visit, no guarantee can be provided that every mistake has been identified and corrected by editing.

## 2022-07-29 ENCOUNTER — HOSPITAL ENCOUNTER (OUTPATIENT)
Age: 87
Setting detail: SPECIMEN
Discharge: HOME | End: 2022-07-29

## 2022-07-29 LAB
ALBUMIN SERPL-MCNC: 3.6 G/DL (ref 3.5–5.2)
ALBUMIN SERPL-MCNC: 3.6 G/DL (ref 3.5–5.2)
ALBUMIN/GLOBULIN RATIO: 1.1 (ref 1–2.5)
ALBUMIN/GLOBULIN RATIO: 1.1 (ref 1–2.5)
ALP BLD-CCNC: 93 U/L (ref 40–129)
ALP BLD-CCNC: 93 U/L (ref 40–129)
ALT SERPL-CCNC: 12 U/L (ref 5–41)
ALT SERPL-CCNC: 12 U/L (ref 5–41)
ANION GAP SERPL CALCULATED.3IONS-SCNC: 8 MMOL/L (ref 9–17)
ANION GAP SERPL CALCULATED.3IONS-SCNC: 8 MMOL/L (ref 9–17)
AST SERPL-CCNC: 28 U/L
AST SERPL-CCNC: 28 U/L
BILIRUB SERPL-MCNC: 0.62 MG/DL (ref 0.3–1.2)
BILIRUB SERPL-MCNC: 0.62 MG/DL (ref 0.3–1.2)
BILIRUBIN URINE: NEGATIVE
BUN BLDV-MCNC: 42 MG/DL (ref 8–23)
BUN BLDV-MCNC: 42 MG/DL (ref 8–23)
CALCIUM SERPL-MCNC: 9.4 MG/DL (ref 8.6–10.4)
CALCIUM SERPL-MCNC: 9.4 MG/DL (ref 8.6–10.4)
CHLORIDE BLD-SCNC: 102 MMOL/L (ref 98–107)
CHLORIDE BLD-SCNC: 102 MMOL/L (ref 98–107)
CHOLESTEROL, FASTING: 109 MG/DL
CHOLESTEROL/HDL RATIO: 2.5
CO2: 28 MMOL/L (ref 20–31)
CO2: 28 MMOL/L (ref 20–31)
COLOR: YELLOW
COMMENT UA: NORMAL
CREAT SERPL-MCNC: 2.45 MG/DL (ref 0.7–1.2)
CREAT SERPL-MCNC: 2.45 MG/DL (ref 0.7–1.2)
CREATININE URINE: 67.5 MG/DL (ref 39–259)
GFR AFRICAN AMERICAN: 30 ML/MIN
GFR AFRICAN AMERICAN: 30 ML/MIN
GFR NON-AFRICAN AMERICAN: 25 ML/MIN
GFR NON-AFRICAN AMERICAN: 25 ML/MIN
GFR SERPL CREATININE-BSD FRML MDRD: ABNORMAL ML/MIN/{1.73_M2}
GFR SERPL CREATININE-BSD FRML MDRD: ABNORMAL ML/MIN/{1.73_M2}
GLUCOSE FASTING: 84 MG/DL (ref 70–99)
GLUCOSE FASTING: 84 MG/DL (ref 70–99)
GLUCOSE URINE: NEGATIVE
HCT VFR BLD CALC: 39.3 % (ref 40.7–50.3)
HCT VFR BLD CALC: 39.3 % (ref 40.7–50.3)
HDLC SERPL-MCNC: 43 MG/DL
HEMOGLOBIN: 12.3 G/DL (ref 13–17)
HEMOGLOBIN: 12.3 G/DL (ref 13–17)
KETONES, URINE: NEGATIVE
LDL CHOLESTEROL: 53 MG/DL (ref 0–130)
LEUKOCYTE ESTERASE, URINE: NEGATIVE
MCH RBC QN AUTO: 25.7 PG (ref 25.2–33.5)
MCHC RBC AUTO-ENTMCNC: 31.3 G/DL (ref 28.4–34.8)
MCV RBC AUTO: 82 FL (ref 82.6–102.9)
NITRITE, URINE: NEGATIVE
NRBC AUTOMATED: 0 PER 100 WBC
PDW BLD-RTO: 15.1 % (ref 11.8–14.4)
PH UA: 6.5 (ref 5–8)
PLATELET # BLD: 196 K/UL (ref 138–453)
PMV BLD AUTO: 11.1 FL (ref 8.1–13.5)
POTASSIUM SERPL-SCNC: 4.2 MMOL/L (ref 3.7–5.3)
POTASSIUM SERPL-SCNC: 4.2 MMOL/L (ref 3.7–5.3)
PRO-BNP: 3983 PG/ML
PROTEIN UA: NEGATIVE
PTH INTACT: 144.9 PG/ML (ref 15–65)
RBC # BLD: 4.79 M/UL (ref 4.21–5.77)
SODIUM BLD-SCNC: 138 MMOL/L (ref 135–144)
SODIUM BLD-SCNC: 138 MMOL/L (ref 135–144)
SPECIFIC GRAVITY UA: 1.01 (ref 1–1.03)
THYROXINE, FREE: 1.17 NG/DL (ref 0.93–1.7)
TOTAL CK: 157 U/L (ref 39–308)
TOTAL PROTEIN, URINE: 8 MG/DL
TOTAL PROTEIN: 6.8 G/DL (ref 6.4–8.3)
TOTAL PROTEIN: 6.8 G/DL (ref 6.4–8.3)
TRIGLYCERIDE, FASTING: 63 MG/DL
TSH SERPL DL<=0.05 MIU/L-ACNC: 2.51 UIU/ML (ref 0.3–5)
TURBIDITY: CLEAR
URINE HGB: NEGATIVE
URINE TOTAL PROTEIN CREATININE RATIO: 0.12 (ref 0–0.2)
UROBILINOGEN, URINE: NORMAL
WBC # BLD: 3.4 K/UL (ref 3.5–11.3)

## 2022-07-30 LAB — VITAMIN D 25-HYDROXY: 50.3 NG/ML

## 2022-07-31 LAB
ESTIMATED AVERAGE GLUCOSE: 120 MG/DL
HBA1C MFR BLD: 5.8 % (ref 4–6)

## 2022-08-19 ENCOUNTER — OFFICE VISIT (OUTPATIENT)
Dept: INTERNAL MEDICINE CLINIC | Age: 87
End: 2022-08-19
Payer: MEDICARE

## 2022-08-19 VITALS
TEMPERATURE: 97.1 F | BODY MASS INDEX: 24.26 KG/M2 | HEIGHT: 69 IN | SYSTOLIC BLOOD PRESSURE: 112 MMHG | OXYGEN SATURATION: 97 % | RESPIRATION RATE: 18 BRPM | WEIGHT: 163.8 LBS | HEART RATE: 71 BPM | DIASTOLIC BLOOD PRESSURE: 60 MMHG

## 2022-08-19 DIAGNOSIS — N18.31 STAGE 3A CHRONIC KIDNEY DISEASE (HCC): ICD-10-CM

## 2022-08-19 DIAGNOSIS — M25.552 LEFT HIP PAIN: Primary | ICD-10-CM

## 2022-08-19 DIAGNOSIS — H61.20 WAX IN EAR: ICD-10-CM

## 2022-08-19 PROBLEM — N18.30 CHRONIC RENAL DISEASE, STAGE III (HCC): Status: ACTIVE | Noted: 2022-08-19

## 2022-08-19 PROCEDURE — 99214 OFFICE O/P EST MOD 30 MIN: CPT | Performed by: INTERNAL MEDICINE

## 2022-08-19 PROCEDURE — 1123F ACP DISCUSS/DSCN MKR DOCD: CPT | Performed by: INTERNAL MEDICINE

## 2022-08-19 RX ORDER — CIPROFLOXACIN/HYDROCORTISONE 0.2 %-1 %
3 SUSPENSION, DROPS(FINAL DOSAGE FORM)(ML) OTIC (EAR) 2 TIMES DAILY
Qty: 1 EACH | Refills: 0 | Status: SHIPPED | OUTPATIENT
Start: 2022-08-19 | End: 2022-08-26

## 2022-08-19 SDOH — ECONOMIC STABILITY: FOOD INSECURITY: WITHIN THE PAST 12 MONTHS, YOU WORRIED THAT YOUR FOOD WOULD RUN OUT BEFORE YOU GOT MONEY TO BUY MORE.: NEVER TRUE

## 2022-08-19 SDOH — ECONOMIC STABILITY: FOOD INSECURITY: WITHIN THE PAST 12 MONTHS, THE FOOD YOU BOUGHT JUST DIDN'T LAST AND YOU DIDN'T HAVE MONEY TO GET MORE.: NEVER TRUE

## 2022-08-19 ASSESSMENT — PATIENT HEALTH QUESTIONNAIRE - PHQ9
SUM OF ALL RESPONSES TO PHQ QUESTIONS 1-9: 0
1. LITTLE INTEREST OR PLEASURE IN DOING THINGS: 0
SUM OF ALL RESPONSES TO PHQ QUESTIONS 1-9: 0
2. FEELING DOWN, DEPRESSED OR HOPELESS: 0
SUM OF ALL RESPONSES TO PHQ9 QUESTIONS 1 & 2: 0
SUM OF ALL RESPONSES TO PHQ QUESTIONS 1-9: 0
SUM OF ALL RESPONSES TO PHQ QUESTIONS 1-9: 0

## 2022-08-19 ASSESSMENT — SOCIAL DETERMINANTS OF HEALTH (SDOH): HOW HARD IS IT FOR YOU TO PAY FOR THE VERY BASICS LIKE FOOD, HOUSING, MEDICAL CARE, AND HEATING?: NOT HARD AT ALL

## 2022-08-19 NOTE — PROGRESS NOTES
medications for this visit.        Allergies   Allergen Reactions    Ace Inhibitors     Other      Beta-blockers slow heart rate    Timolol      Eye drops slow heart rate       Past Medical History:   Diagnosis Date    BPH (benign prostatic hyperplasia)     secondary to reynolds    CAD (coronary artery disease) 1/18/2005    CRF (chronic renal failure) 01/01    patient denies kidney problems at pre-testing 2015    DVD (dissociated vertical deviation)     Gout     H/O cardiac catheterization 1/18/05    stents     HTN (hypertension)     Hx of blood clots     left leg (over 23 yrs ago)    Hypercholesteremia     Impotence     Irregular heartbeat     Nodular prostate     -bx Dr. Pat Brown 8/96 and focal inflammation 6/97    OA (osteoarthritis) of knee     Prostate cancer ([facility] [address] 1/06    Unspecified sleep apnea     no machine       Past Surgical History:   Procedure Laterality Date    CORONARY ANGIOPLASTY      CYSTOPLASTY N/A     urethral dilation 10/93 Dr Domi Bacon Right 1/08/15    Dr. Walter Causey did right ring and little finger metacarpophalangeal joint extension    JOINT REPLACEMENT      PROSTATE SURGERY      seed implant    REVISION TOTAL HIP ARTHROPLASTY Left 05/07    Dr Kiley Montague Right 10/20/2015       Family History   Problem Relation Age of Onset    Coronary Art Dis Mother     Hypertension Mother     Cancer Brother      ROS  Constitutional:  Negative for fatigue, loss of appetite and unexpected weight change  HEENT            : Negative for neck stiffness and pain, no congestion or sinus pressure  Eyes                : No visual disturbance or pain  Cardiovascular: No chest pain or palpitations or leg swelling  Respiratory      : Negative for cough, shortness of breath or wheezing  Gastrointestinal: Negative for abdominal pain, constipation or diarrhea and bloating No nausea or vomiting  Genitourinary:     No urgency or frequency, no burning or hematuria  Musculoskeletal: Positive for arthralgias, back pain or myalgias  Skin                  : Negative for rash or erythema  Neurological    : Negative for dizziness, weakness, tremors ,light headedness or syncope  Psychiatric       : Negative for dysphoric mood, sleep disturbances, nervous or anxious, or decreased concentration  All other review of systems was negative    Objective  Physical Examination:    Nursing note reviewed    /60 (Site: Left Upper Arm, Position: Sitting, Cuff Size: Medium Adult)   Pulse 71   Temp 97.1 °F (36.2 °C) (Temporal)   Resp 18   Ht 5' 9.02\" (1.753 m)   Wt 163 lb 12.8 oz (74.3 kg)   SpO2 97%   BMI 24.18 kg/m²   BP Readings from Last 3 Encounters:   08/19/22 112/60   06/28/22 122/62   03/14/22 116/68         Constitutional:  Leon Horne is oriented to place, person and time ,appears well-developed and well-nourished  HEENT:  Atraumatic and normocephalic, external ears normal bilaterally, nose normal no oropharyngeal exudate and is clear and moist  Eyes:  EOCM normal; conjunctivae normal; PERRLA bilaterally  Neck:  Normal range of motion, neck supple, no JVD and no thyromegaly  Cardiovascular:  RRR, normal heart sounds and intact distal pulses  Pulmonary:  effort normal and breath sounds normal bilaterally,no wheezes or rales, no respiratory distress  Abdominal:  Soft, non-tender; normal bowel sounds, no masses  Musculoskeletal: Limited range of motion and no edema or tenderness bilaterally  No lymphadenopathy  Neurological:  alert, oriented, and normal reflexes bilaterally  Skin: warm and dry  Psychiatric:  normal mood and effect; behavior normal.    Labs:   Lab Results   Component Value Date    LABA1C 5.8 07/29/2022     Lab Results   Component Value Date    CHOL 149 11/06/2020     Lab Results   Component Value Date    HDL 43 07/29/2022     Lab Results   Component Value Date    LDLCALC 64 05/16/2016     Lab Results   Component Value Date    TRIG 32 11/06/2020     No results found for: [place], [place]  Lab Results   Component Value Date    WBC 3.4 (L) 07/29/2022    HGB 12.3 (L) 07/29/2022    HGB 12.3 (L) 07/29/2022    HCT 39.3 (L) 07/29/2022    HCT 39.3 (L) 07/29/2022    MCV 82.0 (L) 07/29/2022     07/29/2022     Lab Results   Component Value Date    INR 1.2 05/12/2020    PROTIME 12.1 (H) 05/12/2020     Lab Results   Component Value Date    GLUCOSE 88 02/09/2022    CREATININE 2.45 (H) 07/29/2022    CREATININE 2.45 (H) 07/29/2022    BUN 42 (H) 07/29/2022    BUN 42 (H) 07/29/2022     07/29/2022     07/29/2022    K 4.2 07/29/2022    K 4.2 07/29/2022     07/29/2022     07/29/2022    CO2 28 07/29/2022    CO2 28 07/29/2022     Lab Results   Component Value Date    ALT 12 07/29/2022    ALT 12 07/29/2022    AST 28 07/29/2022    AST 28 07/29/2022    ALKPHOS 93 07/29/2022    ALKPHOS 93 07/29/2022    BILITOT 0.62 07/29/2022    BILITOT 0.62 07/29/2022     Lab Results   Component Value Date    LABPROT 5.3 (L) 11/18/2012    LABALBU 3.6 07/29/2022    LABALBU 3.6 07/29/2022     Lab Results   Component Value Date    TSH 2.51 07/29/2022     Assessment:  1. Left hip pain    2. Stage 3a chronic kidney disease ([address]    3.  Wax in ear        Plan:  Patient's visit to the emergency room and report is reviewed and x-rays did not show any abnormality and patient's symptoms improved after using the Voltaren gel and I advised him to continue same and doing exercises and also patient has an appointment to see orthopedics Dr. Wes Clifford on coming Friday at [facility]  Patient's ears cleaned but patient has complaining of pain and so advised him to follow-up with ENT and referral made  There was some bleeding from the external ear canal that there is some break in the skin during the cleaning of the ear and it was clean and the bleeding stopped and advised patient to use Cipro otic eardrops twice a day and also advised him to go to the emergency room if bleeding recurs otherwise see the ENT as scheduled  Renal function is stable and following with nephrology  Review as scheduled           1. Mushtaq Qureshi received counseling on the following healthy behaviors: nutrition and exercise    2. Prior labs and health maintenance reviewed. 3.  Discussed use, benefit, and side effects of prescribed medications. Barriers to medication compliance addressed. All his questions were answered. Pt voiced understanding. Mushtaq Qureshi will continue current medications, diet and exercise. No orders of the defined types were placed in this encounter. Completed Refills               Requested Prescriptions      No prescriptions requested or ordered in this encounter     4. Patient given educational materials - see patient instructions    5. Was a self-tracking handout given in paper form or via Con[employer]? NO    No orders of the defined types were placed in this encounter. No follow-ups on file. Patient voiced understanding and agreed to treatment plan. Electronically signed by Tana Doll MD on 8/19/2022 at 12:17 PM    This note is created with a voice recognition program and while intend to generate a document that accurately reflects the content of the visit, no guarantee can be provided that every mistake has been identified and corrected by editing.

## 2022-10-28 ENCOUNTER — HOSPITAL ENCOUNTER (OUTPATIENT)
Age: 87
Setting detail: SPECIMEN
Discharge: HOME | End: 2022-10-28

## 2022-10-28 LAB
ALBUMIN SERPL-MCNC: 3.8 G/DL (ref 3.5–5.2)
ALBUMIN/GLOBULIN RATIO: 1.4 (ref 1–2.5)
ALP BLD-CCNC: 66 U/L (ref 40–129)
ALT SERPL-CCNC: 31 U/L (ref 5–41)
ANION GAP SERPL CALCULATED.3IONS-SCNC: 17 MMOL/L (ref 9–17)
AST SERPL-CCNC: 41 U/L
BILIRUB SERPL-MCNC: 0.5 MG/DL (ref 0.3–1.2)
BILIRUBIN URINE: NEGATIVE
BUN BLDV-MCNC: 51 MG/DL (ref 8–23)
CALCIUM SERPL-MCNC: 10 MG/DL (ref 8.6–10.4)
CASTS UA: NORMAL /LPF (ref 0–8)
CHLORIDE BLD-SCNC: 106 MMOL/L (ref 98–107)
CO2: 25 MMOL/L (ref 20–31)
COLOR: YELLOW
CREAT SERPL-MCNC: 2.56 MG/DL (ref 0.7–1.2)
CREATININE URINE: 40.9 MG/DL (ref 39–259)
EPITHELIAL CELLS UA: NORMAL /HPF (ref 0–5)
GFR SERPL CREATININE-BSD FRML MDRD: 23 ML/MIN/1.73M2
GLUCOSE BLD-MCNC: 74 MG/DL (ref 70–99)
GLUCOSE URINE: NEGATIVE
HCT VFR BLD CALC: 43.7 % (ref 40.7–50.3)
HEMOGLOBIN: 13.6 G/DL (ref 13–17)
KETONES, URINE: NEGATIVE
LEUKOCYTE ESTERASE, URINE: NEGATIVE
NITRITE, URINE: NEGATIVE
PH UA: 6 (ref 5–8)
PHOSPHORUS: 2.6 MG/DL (ref 2.5–4.5)
POTASSIUM SERPL-SCNC: 3.8 MMOL/L (ref 3.7–5.3)
PROTEIN UA: ABNORMAL
PTH INTACT: 138.3 PG/ML (ref 14–72)
RBC UA: NORMAL /HPF (ref 0–4)
SODIUM BLD-SCNC: 148 MMOL/L (ref 135–144)
SPECIFIC GRAVITY UA: 1.01 (ref 1–1.03)
TOTAL PROTEIN, URINE: 12 MG/DL
TOTAL PROTEIN: 6.6 G/DL (ref 6.4–8.3)
TURBIDITY: CLEAR
URINE HGB: ABNORMAL
URINE TOTAL PROTEIN CREATININE RATIO: 0.29 (ref 0–0.2)
UROBILINOGEN, URINE: NORMAL
VITAMIN D 25-HYDROXY: 55.5 NG/ML
WBC UA: NORMAL /HPF (ref 0–5)

## 2023-01-17 ENCOUNTER — OFFICE VISIT (OUTPATIENT)
Dept: INTERNAL MEDICINE CLINIC | Age: 88
End: 2023-01-17

## 2023-01-17 VITALS
SYSTOLIC BLOOD PRESSURE: 110 MMHG | DIASTOLIC BLOOD PRESSURE: 54 MMHG | HEART RATE: 73 BPM | TEMPERATURE: 98 F | HEIGHT: 69 IN | RESPIRATION RATE: 18 BRPM | BODY MASS INDEX: 24.44 KG/M2 | OXYGEN SATURATION: 98 % | WEIGHT: 165 LBS

## 2023-01-17 DIAGNOSIS — Z23 NEED FOR PROPHYLACTIC VACCINATION AND INOCULATION AGAINST VARICELLA: ICD-10-CM

## 2023-01-17 DIAGNOSIS — Z00.00 MEDICARE ANNUAL WELLNESS VISIT, SUBSEQUENT: Primary | ICD-10-CM

## 2023-01-17 DIAGNOSIS — Z23 NEED FOR PROPHYLACTIC VACCINATION AGAINST DIPHTHERIA-TETANUS-PERTUSSIS (DTP): ICD-10-CM

## 2023-01-17 ASSESSMENT — PATIENT HEALTH QUESTIONNAIRE - PHQ9
SUM OF ALL RESPONSES TO PHQ QUESTIONS 1-9: 0
2. FEELING DOWN, DEPRESSED OR HOPELESS: 0
SUM OF ALL RESPONSES TO PHQ QUESTIONS 1-9: 0
SUM OF ALL RESPONSES TO PHQ QUESTIONS 1-9: 0
SUM OF ALL RESPONSES TO PHQ9 QUESTIONS 1 & 2: 0
SUM OF ALL RESPONSES TO PHQ QUESTIONS 1-9: 0
1. LITTLE INTEREST OR PLEASURE IN DOING THINGS: 0

## 2023-01-17 ASSESSMENT — LIFESTYLE VARIABLES: HOW OFTEN DO YOU HAVE A DRINK CONTAINING ALCOHOL: MONTHLY OR LESS

## 2023-01-17 NOTE — PATIENT INSTRUCTIONS
Learning About Vision Tests  What are vision tests? The four most common vision tests are visual acuity tests, refraction, visual field tests, and color vision tests. Visual acuity (sharpness) tests  These tests are used: To see if you need glasses or contact lenses. To monitor an eye problem. To check an eye injury. Visual acuity tests are done as part of routine exams. You may also have this test when you get your 's license or apply for some types of jobs. Visual field tests  These tests are used: To check for vision loss in any area of your range of vision. To screen for certain eye diseases. To look for nerve damage after a stroke, head injury, or other problem that could reduce blood flow to the brain. Refraction and color tests  A refraction test is done to find the right prescription for glasses and contact lenses. A color vision test is done to check for color blindness. Color vision is often tested as part of a routine exam. You may also have this test when you apply for a job where recognizing different colors is important, such as , electronics, or the [employer]. How are vision tests done? Visual acuity test   You cover one eye at a time. You read aloud from a wall chart across the room. You read aloud from a small card that you hold in your hand. Refraction   You look into a special device. The device puts lenses of different strengths in front of each eye to see how strong your glasses or contact lenses need to be. Visual field tests   Your doctor may have you look through special machines. Or your doctor may simply have you stare straight ahead while they move a finger into and out of your field of vision. Color vision test   You look at pieces of printed test patterns in various colors. You say what number or symbol you see. Your doctor may have you trace the number or symbol using a pointer. How do these tests feel?   There is very little chance of having a problem from this test. If dilating drops are used for a vision test, they may make the eyes sting and cause a medicine taste in the mouth. Follow-up care is a key part of your treatment and safety. Be sure to make and go to all appointments, and call your doctor if you are having problems. It's also a good idea to know your test results and keep a list of the medicines you take. Where can you learn more? Go to http://www.sol.com/ and enter G551 to learn more about \"Learning About Vision Tests. \"  Current as of: October 12, 2022               Content Version: 13.5  © 0055-4925 [employer]. Care instructions adapted under license by [facility] ([facility]). If you have questions about a medical condition or this instruction, always ask your healthcare professional. [address] any warranty or liability for your use of this information. Advance Directives: Care Instructions  Overview  An advance directive is a legal way to state your wishes at the end of your life. It tells your family and your doctor what to do if you can't say what you want. There are two main types of advance directives. You can change them any time your wishes change. Living will. This form tells your family and your doctor your wishes about life support and other treatment. The form is also called a declaration. Medical power of . This form lets you name a person to make treatment decisions for you when you can't speak for yourself. This person is called a health care agent (health care proxy, health care surrogate). The form is also called a durable power of  for health care. If you do not have an advance directive, decisions about your medical care may be made by a family member, or by a doctor or a  who doesn't know you. It may help to think of an advance directive as a gift to the people who care for you.  If you have one, they won't have to make tough decisions by themselves. For more information, including forms for your state, see the [address] website (www.caringinfo.org/planning/advance-directives/). Follow-up care is a key part of your treatment and safety. Be sure to make and go to all appointments, and call your doctor if you are having problems. It's also a good idea to know your test results and keep a list of the medicines you take. What should you include in an advance directive? Many states have a unique advance directive form. (It may ask you to address specific issues.) Or you might use a universal form that's approved by many states. If your form doesn't tell you what to address, it may be hard to know what to include in your advance directive. Use the questions below to help you get started. Who do you want to make decisions about your medical care if you are not able to? What life-support measures do you want if you have a serious illness that gets worse over time or can't be cured? What are you most afraid of that might happen? (Maybe you're afraid of having pain, losing your independence, or being kept alive by machines.)  Where would you prefer to die? (Your home? A hospital? A nursing home?)  Do you want to donate your organs when you die? Do you want certain [facility] practices performed before you die? When should you call for help? Be sure to contact your doctor if you have any questions. Where can you learn more? Go to http://www.sol.com/ and enter R264 to learn more about \"Advance Directives: Care Instructions. \"  Current as of: June 16, 2022               Content Version: 13.5  © 6591-2426 [employer]. Care instructions adapted under license by [facility] ([facility]). If you have questions about a medical condition or this instruction, always ask your healthcare professional. [address] any warranty or liability for your use of this information.       Personalized Preventive Plan for Jamilah Barlow - 1/17/2023  Medicare offers a range of preventive health benefits. Some of the tests and screenings are paid in full while other may be subject to a deductible, co-insurance, and/or copay. Some of these benefits include a comprehensive review of your medical history including lifestyle, illnesses that may run in your family, and various assessments and screenings as appropriate. After reviewing your medical record and screening and assessments performed today your provider may have ordered immunizations, labs, imaging, and/or referrals for you. A list of these orders (if applicable) as well as your Preventive Care list are included within your After Visit Summary for your review. Other Preventive Recommendations:    A preventive eye exam performed by an eye specialist is recommended every 1-2 years to screen for glaucoma; cataracts, macular degeneration, and other eye disorders. A preventive dental visit is recommended every 6 months. Try to get at least 150 minutes of exercise per week or 10,000 steps per day on a pedometer . Order or download the FREE \"Exercise & Physical Activity: Your Everyday Guide\" from [employer]. Call 1-521.947.2159 or search [employer] online. You need 5527-9918 mg of calcium and 1394-2684 IU of vitamin D per day. It is possible to meet your calcium requirement with diet alone, but a vitamin D supplement is usually necessary to meet this goal.  When exposed to the sun, use a sunscreen that protects against both UVA and UVB radiation with an SPF of 30 or greater. Reapply every 2 to 3 hours or after sweating, drying off with a towel, or swimming. Always wear a seat belt when traveling in a car. Always wear a helmet when riding a bicycle or motorcycle.

## 2023-01-17 NOTE — PROGRESS NOTES
Medicare Annual Wellness Visit    Flor Kelly is here for Medicare AWV (Paper work completed ) and Health Maintenance (Tdap, shingles, psa)    Assessment & Plan   Medicare annual wellness visit, subsequent  Need for prophylactic vaccination against diphtheria-tetanus-pertussis (DTP)  -     Tdap (ADACEL) 5-2-15.5 LF-MCG/0.5 injection; Inject 0.5 mLs into the muscle once for 1 dose, Disp-0.5 mL, R-0Print  Need for prophylactic vaccination and inoculation against varicella  -     zoster recombinant adjuvanted vaccine [facility]) 50 MCG/0.5ML SUSR injection; Inject 0.5 mLs into the muscle once for 1 dose, Disp-0.5 mL, R-0Print      Recommendations for Preventive Services Due: see orders and patient instructions/AVS.  Recommended screening schedule for the next 5-10 years is provided to the patient in written form: see Patient Instructions/AVS.     Return for Medicare Annual Wellness Visit in 1 year. Subjective   No issues and no PSAs as patient is not interested in getting it done anymore and I did talk to him about shingles vaccine and Tdap and prescriptions given    Patient's complete Health Risk Assessment and screening values have been reviewed and are found in Flowsheets. The following problems were reviewed today and where indicated follow up appointments were made and/or referrals ordered. Positive Risk Factor Screenings with Interventions:                   Hearing Screen:  Do you or your family notice any trouble with your hearing that hasn't been managed with hearing aids?: (!) Yes    Interventions:  Patient has hearing aids and no further intervention needed    Vision Screen:  Do you have difficulty driving, watching TV, or doing any of your daily activities because of your eyesight?: No  Have you had an eye exam within the past year?: (!) No  No results found.     Interventions:  No interventions needed    Safety:  Do you always fasten your seatbelt when you are in a car?: (!) No  Interventions:  No interventions                     Objective   Vitals:    01/17/23 1423   BP: (!) 110/54   Site: Right Upper Arm   Position: Sitting   Cuff Size: Medium Adult   Pulse: 73   Resp: 18   Temp: 98 °F (36.7 °C)   TempSrc: Temporal   SpO2: 98%   Weight: 165 lb (74.8 kg)   Height: 5' 9.02\" (1.753 m)      Body mass index is 24.35 kg/m². General Appearance: alert and oriented to person, place and time, well developed and well- nourished, in no acute distress  Skin: warm and dry, no rash or erythema  Head: normocephalic and atraumatic  Eyes: pupils equal, round, and reactive to light, extraocular eye movements intact, conjunctivae normal  ENT: tympanic membrane, external ear and ear canal normal bilaterally, nose without deformity, nasal mucosa and turbinates normal without polyps  Neck: supple and non-tender without mass, no thyromegaly or thyroid nodules, no cervical lymphadenopathy  Pulmonary/Chest: clear to auscultation bilaterally- no wheezes, rales or rhonchi, normal air movement, no respiratory distress  Cardiovascular: normal rate, regular rhythm, normal S1 and S2, no murmurs, rubs, clicks, or gallops, distal pulses intact, no carotid bruits  Abdomen: soft, non-tender, non-distended, normal bowel sounds, no masses or organomegaly  Extremities: no cyanosis, clubbing or edema  Musculoskeletal: normal range of motion, no joint swelling, deformity or tenderness  Neurologic: reflexes normal and symmetric, no cranial nerve deficit, gait, coordination and speech normal       Allergies   Allergen Reactions    Ace Inhibitors     Other      Beta-blockers slow heart rate    Timolol      Eye drops slow heart rate     Prior to Visit Medications    Medication Sig Taking?  Authorizing Provider   Tdap (ADACEL) 5-2-15.5 LF-MCG/0.5 injection Inject 0.5 mLs into the muscle once for 1 dose Yes Radha Oliver MD   zoster recombinant adjuvanted vaccine [facility]) 50 MCG/0.5ML SUSR injection Inject 0.5 mLs into the muscle once for 1 dose Yes Isaias Menjivar MD   AMITIZA 24 MCG capsule Take 1 capsule by mouth 2 times daily (with meals) Yes Isaias Menjivar MD   spironolactone (ALDACTONE) 25 MG tablet Take 1 tablet by mouth daily Yes Isaias Menjivar MD   Nutritional Supplements (NUTRITIONAL DRINK) LIQD Take 1 each by mouth 2 times daily 1 can twice a day Yes Isaias Menjivar MD   bisoprolol (ZEBETA) 5 MG tablet Take 5 mg by mouth daily Yes Historical Provider, MD   bumetanide (BUMEX) 2 MG tablet take 1 tablet by mouth twice a day Yes Historical Provider, MD   apixaban (ELIQUIS) 2.5 MG TABS tablet Take 1 tablet by mouth 2 times daily Yes Isaias Menjivar MD   famotidine (PEPCID) 20 MG tablet Take 1 tablet by mouth daily Yes Isaias Menjivar MD   rosuvastatin (CRESTOR) 40 MG tablet Take 1 tablet by mouth every evening Yes Isaias Menjivar MD   aspirin 81 MG tablet Take 1 tablet by mouth daily  Patient taking differently: Take 81 mg by mouth three times a week Yes Isaias Menjivar MD   diclofenac sodium (VOLTAREN) 1 % GEL Apply 4 g topically in the morning and at bedtime  Patient not taking: Reported on 1/17/2023  Isaias Menjivar MD       [facility] (Including outside providers/suppliers regularly involved in providing care):   Patient Care Team:  Isaias Menjivar MD as PCP - General  Isaias Menjivar MD as PCP - [facility]  Anju Thomas MD as Consulting Physician (Cardiology)  Yaya Rowley MD as Consulting Physician (Nephrology)  Oz Loya MD as Consulting Physician (Gastroenterology)     Reviewed and updated this visit:  Tobacco  Allergies  Meds  Med Hx  Surg Hx  Soc Hx  Fam Hx

## 2023-01-27 ENCOUNTER — HOSPITAL ENCOUNTER (OUTPATIENT)
Age: 88
Setting detail: SPECIMEN
Discharge: HOME | End: 2023-01-27

## 2023-01-27 LAB
ALBUMIN SERPL-MCNC: 4 G/DL (ref 3.5–5.2)
ALBUMIN SERPL-MCNC: 4 G/DL (ref 3.5–5.2)
ALBUMIN/GLOBULIN RATIO: 1.5 (ref 1–2.5)
ALBUMIN/GLOBULIN RATIO: 1.5 (ref 1–2.5)
ALP BLD-CCNC: 83 U/L (ref 40–129)
ALP BLD-CCNC: 83 U/L (ref 40–129)
ALT SERPL-CCNC: 13 U/L (ref 5–41)
ALT SERPL-CCNC: 13 U/L (ref 5–41)
ANION GAP SERPL CALCULATED.3IONS-SCNC: 8 MMOL/L (ref 9–17)
ANION GAP SERPL CALCULATED.3IONS-SCNC: 8 MMOL/L (ref 9–17)
AST SERPL-CCNC: 28 U/L
AST SERPL-CCNC: 28 U/L
BILIRUB SERPL-MCNC: 0.6 MG/DL (ref 0.3–1.2)
BILIRUB SERPL-MCNC: 0.6 MG/DL (ref 0.3–1.2)
BILIRUBIN URINE: NEGATIVE
BUN BLDV-MCNC: 47 MG/DL (ref 8–23)
BUN BLDV-MCNC: 47 MG/DL (ref 8–23)
CALCIUM SERPL-MCNC: 9.7 MG/DL (ref 8.6–10.4)
CALCIUM SERPL-MCNC: 9.7 MG/DL (ref 8.6–10.4)
CHLORIDE BLD-SCNC: 104 MMOL/L (ref 98–107)
CHLORIDE BLD-SCNC: 104 MMOL/L (ref 98–107)
CHOLESTEROL, FASTING: 117 MG/DL
CHOLESTEROL/HDL RATIO: 2.4
CO2: 28 MMOL/L (ref 20–31)
CO2: 28 MMOL/L (ref 20–31)
COLOR: YELLOW
CREAT SERPL-MCNC: 2.6 MG/DL (ref 0.7–1.2)
CREAT SERPL-MCNC: 2.6 MG/DL (ref 0.7–1.2)
CREATININE URINE: 42.6 MG/DL (ref 39–259)
EPITHELIAL CELLS UA: NORMAL /HPF (ref 0–5)
ESTIMATED AVERAGE GLUCOSE: 123 MG/DL
GFR SERPL CREATININE-BSD FRML MDRD: 22 ML/MIN/1.73M2
GFR SERPL CREATININE-BSD FRML MDRD: 22 ML/MIN/1.73M2
GLUCOSE FASTING: 85 MG/DL (ref 70–99)
GLUCOSE FASTING: 85 MG/DL (ref 70–99)
GLUCOSE URINE: NEGATIVE
HBA1C MFR BLD: 5.9 % (ref 4–6)
HCT VFR BLD CALC: 41 % (ref 40.7–50.3)
HCT VFR BLD CALC: 42 % (ref 40.7–50.3)
HDLC SERPL-MCNC: 49 MG/DL
HEMOGLOBIN: 12.8 G/DL (ref 13–17)
HEMOGLOBIN: 13.1 G/DL (ref 13–17)
IRON SATURATION: 27 % (ref 20–55)
IRON: 59 UG/DL (ref 59–158)
KETONES, URINE: NEGATIVE
LDL CHOLESTEROL: 52 MG/DL (ref 0–130)
LEUKOCYTE ESTERASE, URINE: NEGATIVE
MAGNESIUM: 2.4 MG/DL (ref 1.6–2.6)
MCH RBC QN AUTO: 27 PG (ref 25.2–33.5)
MCHC RBC AUTO-ENTMCNC: 31.2 G/DL (ref 28.4–34.8)
MCV RBC AUTO: 86.6 FL (ref 82.6–102.9)
NITRITE, URINE: NEGATIVE
NRBC AUTOMATED: 0 PER 100 WBC
PDW BLD-RTO: 13.4 % (ref 11.8–14.4)
PH UA: 7 (ref 5–8)
PHOSPHORUS: 3 MG/DL (ref 2.5–4.5)
PHOSPHORUS: 3 MG/DL (ref 2.5–4.5)
PLATELET # BLD: 140 K/UL (ref 138–453)
PMV BLD AUTO: 11.4 FL (ref 8.1–13.5)
POTASSIUM SERPL-SCNC: 3.6 MMOL/L (ref 3.7–5.3)
POTASSIUM SERPL-SCNC: 3.6 MMOL/L (ref 3.7–5.3)
PRO-BNP: 5017 PG/ML
PROTEIN UA: ABNORMAL
PTH INTACT: 169.9 PG/ML (ref 14–72)
RBC # BLD: 4.85 M/UL (ref 4.21–5.77)
RBC UA: NORMAL /HPF (ref 0–4)
SODIUM BLD-SCNC: 140 MMOL/L (ref 135–144)
SODIUM BLD-SCNC: 140 MMOL/L (ref 135–144)
SPECIFIC GRAVITY UA: 1.01 (ref 1–1.03)
THYROXINE, FREE: 1.14 NG/DL (ref 0.93–1.7)
TOTAL CK: 341 U/L (ref 39–308)
TOTAL IRON BINDING CAPACITY: 216 UG/DL (ref 250–450)
TOTAL PROTEIN, URINE: 14 MG/DL
TOTAL PROTEIN: 6.6 G/DL (ref 6.4–8.3)
TOTAL PROTEIN: 6.6 G/DL (ref 6.4–8.3)
TRIGLYCERIDE, FASTING: 80 MG/DL
TSH SERPL DL<=0.05 MIU/L-ACNC: 3.18 UIU/ML (ref 0.3–5)
TURBIDITY: CLEAR
UNSATURATED IRON BINDING CAPACITY: 157 UG/DL (ref 112–347)
URINE HGB: ABNORMAL
URINE TOTAL PROTEIN CREATININE RATIO: 0.33 (ref 0–0.2)
UROBILINOGEN, URINE: NORMAL
VITAMIN B-12: 927 PG/ML (ref 232–1245)
VITAMIN D 25-HYDROXY: 48.6 NG/ML
WBC # BLD: 4.3 K/UL (ref 3.5–11.3)
WBC UA: NORMAL /HPF (ref 0–5)

## 2023-03-30 ENCOUNTER — TELEPHONE (OUTPATIENT)
Dept: INTERNAL MEDICINE CLINIC | Age: 88
End: 2023-03-30

## 2023-03-30 DIAGNOSIS — R63.0 LOSS OF APPETITE: ICD-10-CM

## 2023-03-30 DIAGNOSIS — R63.4 WEIGHT LOSS: ICD-10-CM

## 2023-03-30 NOTE — TELEPHONE ENCOUNTER
Patient asking for a RX for his nutritional supplements to go to [facility]?     Since his insurance changed he has to use this company    Fax# [phone]    Please advise

## 2023-03-31 RX ORDER — MEDICAL SUPPLY, MISCELLANEOUS
1 EACH MISCELLANEOUS 2 TIMES DAILY
Qty: 237 ML | Refills: 5 | Status: SHIPPED | OUTPATIENT
Start: 2023-03-31

## 2023-04-07 ENCOUNTER — TELEPHONE (OUTPATIENT)
Dept: INTERNAL MEDICINE CLINIC | Age: 88
End: 2023-04-07

## 2023-04-07 NOTE — TELEPHONE ENCOUNTER
Patient called stating he has not received his nutritional supplements from [address] and wanted to know if we faxed over the rx to [address]. I did confirm according to the notes that it was faxed over on 3/31/23. Patient called back and stated that [address] sent over request for medical records for the pt. Please follow up about this so pt may receive his supplement thank you.

## 2023-05-31 ENCOUNTER — TELEMEDICINE (OUTPATIENT)
Dept: INTERNAL MEDICINE CLINIC | Age: 88
End: 2023-05-31
Payer: MEDICARE

## 2023-05-31 DIAGNOSIS — M25.512 ACUTE PAIN OF LEFT SHOULDER: Primary | ICD-10-CM

## 2023-05-31 DIAGNOSIS — K59.00 CONSTIPATION, UNSPECIFIED CONSTIPATION TYPE: ICD-10-CM

## 2023-05-31 PROCEDURE — 99443 PR PHYS/QHP TELEPHONE EVALUATION 21-30 MIN: CPT | Performed by: INTERNAL MEDICINE

## 2023-05-31 RX ORDER — PREDNISONE 1 MG/1
5 TABLET ORAL 2 TIMES DAILY
Qty: 10 TABLET | Refills: 0 | Status: SHIPPED | OUTPATIENT
Start: 2023-05-31 | End: 2023-06-05

## 2023-05-31 NOTE — PROGRESS NOTES
Lazarus Hora is a 80 y.o. male evaluated via telephone on 5/31/2023 for Shoulder Pain (Patient states he is having some left shoulder npain; going on for a week) and Health Maintenance (Shingles, psa)  . Documentation:  I communicated with the patient and/or health care decision maker about pain in the left shoulder on movement and elevation is difficult and having problems extension and abduction going on for a week denies any injury  Details of this discussion including any medical advice provided: Advised patient to try prednisone 5 mg twice daily for 5 days  Referred to orthopedics for evaluation  Patient also states that his constipation resolved and have questions about taking the MiraLAX and wants to hold it and advised him to hold it and see if his symptoms come back if not continue to hold MiraLAX  Review as scheduled    Total Time: minutes: 21-30 minutes    Preston Carson was evaluated through a synchronous (real-time) audio encounter. Patient identification was verified at the start of the visit. He (or guardian if applicable) is aware that this is a billable service, which includes applicable co-pays. This visit was conducted with the patient's (and/or legal guardian's) verbal consent. He has not had a related appointment within my department in the past 7 days or scheduled within the next 24 hours. The patient was located at Home: [place] [address]. The provider was located at [facility] (Appt Dept): [facility]  [address] [facility],[address]  [place],  [address] [place] [address].     Note: not billable if this call serves to triage the patient into an appointment for the relevant concern    Bala Rojas MD

## 2023-06-05 ENCOUNTER — HOSPITAL ENCOUNTER (OUTPATIENT)
Age: 88
Setting detail: SPECIMEN
Discharge: HOME | End: 2023-06-05

## 2023-06-05 LAB
ANION GAP SERPL CALCULATED.3IONS-SCNC: 18 MMOL/L (ref 9–17)
BUN SERPL-MCNC: 73 MG/DL (ref 8–23)
CALCIUM SERPL-MCNC: 9.6 MG/DL (ref 8.6–10.4)
CHLORIDE SERPL-SCNC: 97 MMOL/L (ref 98–107)
CO2 SERPL-SCNC: 23 MMOL/L (ref 20–31)
CREAT SERPL-MCNC: 3.31 MG/DL (ref 0.7–1.2)
GFR SERPL CREATININE-BSD FRML MDRD: 17 ML/MIN/1.73M2
GLUCOSE SERPL-MCNC: 97 MG/DL (ref 70–99)
POTASSIUM SERPL-SCNC: 4 MMOL/L (ref 3.7–5.3)
SODIUM SERPL-SCNC: 138 MMOL/L (ref 135–144)

## 2023-07-13 ENCOUNTER — HOSPITAL ENCOUNTER (OUTPATIENT)
Age: 88
Setting detail: SPECIMEN
Discharge: HOME | End: 2023-07-13

## 2023-07-13 LAB
25(OH)D3 SERPL-MCNC: 50.1 NG/ML
ALBUMIN SERPL-MCNC: 4.3 G/DL (ref 3.5–5.2)
ALBUMIN/GLOB SERPL: 1.5 {RATIO} (ref 1–2.5)
ALP SERPL-CCNC: 84 U/L (ref 40–129)
ALT SERPL-CCNC: 16 U/L (ref 5–41)
ANION GAP SERPL CALCULATED.3IONS-SCNC: 11 MMOL/L (ref 9–17)
AST SERPL-CCNC: 33 U/L
BILIRUB SERPL-MCNC: 0.6 MG/DL (ref 0.3–1.2)
BILIRUB UR QL STRIP: NEGATIVE
BUN SERPL-MCNC: 57 MG/DL (ref 8–23)
CALCIUM SERPL-MCNC: 9.7 MG/DL (ref 8.6–10.4)
CASTS #/AREA URNS LPF: NORMAL /LPF (ref 0–8)
CHLORIDE SERPL-SCNC: 98 MMOL/L (ref 98–107)
CLARITY UR: CLEAR
CO2 SERPL-SCNC: 29 MMOL/L (ref 20–31)
COLOR UR: YELLOW
CREAT SERPL-MCNC: 3.5 MG/DL (ref 0.7–1.2)
CREAT UR-MCNC: 47.3 MG/DL (ref 39–259)
EPI CELLS #/AREA URNS HPF: NORMAL /HPF (ref 0–5)
GFR SERPL CREATININE-BSD FRML MDRD: 16 ML/MIN/1.73M2
GLUCOSE SERPL-MCNC: 77 MG/DL (ref 70–99)
GLUCOSE UR STRIP-MCNC: NEGATIVE MG/DL
HCT VFR BLD AUTO: 42.2 % (ref 40.7–50.3)
HGB BLD-MCNC: 13.5 G/DL (ref 13–17)
HGB UR QL STRIP.AUTO: ABNORMAL
KETONES UR STRIP-MCNC: NEGATIVE MG/DL
LEUKOCYTE ESTERASE UR QL STRIP: NEGATIVE
NITRITE UR QL STRIP: NEGATIVE
PH UR STRIP: 7 [PH] (ref 5–8)
PHOSPHATE SERPL-MCNC: 3 MG/DL (ref 2.5–4.5)
POTASSIUM SERPL-SCNC: 4.8 MMOL/L (ref 3.7–5.3)
PROT SERPL-MCNC: 7.1 G/DL (ref 6.4–8.3)
PROT UR STRIP-MCNC: NEGATIVE MG/DL
PTH-INTACT SERPL-MCNC: 286.2 PG/ML (ref 14–72)
RBC #/AREA URNS HPF: NORMAL /HPF (ref 0–4)
SODIUM SERPL-SCNC: 138 MMOL/L (ref 135–144)
SP GR UR STRIP: 1.01 (ref 1–1.03)
TOTAL PROTEIN, URINE: 8 MG/DL
URINE TOTAL PROTEIN CREATININE RATIO: 0.17 (ref 0–0.2)
UROBILINOGEN UR STRIP-ACNC: NORMAL EU/DL (ref 0–1)
WBC #/AREA URNS HPF: NORMAL /HPF (ref 0–5)

## 2023-08-08 ENCOUNTER — OFFICE VISIT (OUTPATIENT)
Dept: INTERNAL MEDICINE CLINIC | Age: 88
End: 2023-08-08
Payer: MEDICARE

## 2023-08-08 VITALS
TEMPERATURE: 97 F | BODY MASS INDEX: 23.99 KG/M2 | DIASTOLIC BLOOD PRESSURE: 60 MMHG | HEART RATE: 64 BPM | OXYGEN SATURATION: 92 % | RESPIRATION RATE: 18 BRPM | WEIGHT: 162 LBS | SYSTOLIC BLOOD PRESSURE: 120 MMHG | HEIGHT: 69 IN

## 2023-08-08 DIAGNOSIS — N18.31 STAGE 3A CHRONIC KIDNEY DISEASE (HCC): ICD-10-CM

## 2023-08-08 DIAGNOSIS — R25.2 MUSCLE CRAMPS: Primary | ICD-10-CM

## 2023-08-08 DIAGNOSIS — I10 ESSENTIAL HYPERTENSION: ICD-10-CM

## 2023-08-08 PROCEDURE — 1123F ACP DISCUSS/DSCN MKR DOCD: CPT | Performed by: INTERNAL MEDICINE

## 2023-08-08 PROCEDURE — G8427 DOCREV CUR MEDS BY ELIG CLIN: HCPCS | Performed by: INTERNAL MEDICINE

## 2023-08-08 PROCEDURE — 99214 OFFICE O/P EST MOD 30 MIN: CPT | Performed by: INTERNAL MEDICINE

## 2023-08-08 PROCEDURE — G8420 CALC BMI NORM PARAMETERS: HCPCS | Performed by: INTERNAL MEDICINE

## 2023-08-08 PROCEDURE — 1036F TOBACCO NON-USER: CPT | Performed by: INTERNAL MEDICINE

## 2023-08-08 SDOH — ECONOMIC STABILITY: FOOD INSECURITY: WITHIN THE PAST 12 MONTHS, YOU WORRIED THAT YOUR FOOD WOULD RUN OUT BEFORE YOU GOT MONEY TO BUY MORE.: NEVER TRUE

## 2023-08-08 SDOH — ECONOMIC STABILITY: INCOME INSECURITY: HOW HARD IS IT FOR YOU TO PAY FOR THE VERY BASICS LIKE FOOD, HOUSING, MEDICAL CARE, AND HEATING?: NOT HARD AT ALL

## 2023-08-08 SDOH — ECONOMIC STABILITY: FOOD INSECURITY: WITHIN THE PAST 12 MONTHS, THE FOOD YOU BOUGHT JUST DIDN'T LAST AND YOU DIDN'T HAVE MONEY TO GET MORE.: NEVER TRUE

## 2023-08-08 NOTE — PROGRESS NOTES
exercise. No orders of the defined types were placed in this encounter. Completed Refills               Requested Prescriptions      No prescriptions requested or ordered in this encounter     4. Patient given educational materials - see patient instructions    5. Was a self-tracking handout given in paper form or via [email or web address]? NO    Orders Placed This Encounter   Procedures    Comprehensive Metabolic Panel     Standing Status:   Future     Standing Expiration Date:   8/7/2024    Magnesium     Standing Status:   Future     Standing Expiration Date:   8/7/2024     Return in about 6 months (around 2/8/2024). Patient voiced understanding and agreed to treatment plan. Electronically signed by Violeta Reina MD on 8/8/2023 at 2:32 PM    This note is created with a voice recognition program and while intend to generate a document that accurately reflects the content of the visit, no guarantee can be provided that every mistake has been identified and corrected by editing.

## 2023-08-09 ENCOUNTER — HOSPITAL ENCOUNTER (OUTPATIENT)
Age: 88
Setting detail: SPECIMEN
Discharge: HOME | End: 2023-08-09

## 2023-08-09 DIAGNOSIS — N18.31 STAGE 3A CHRONIC KIDNEY DISEASE (HCC): ICD-10-CM

## 2023-08-09 LAB
ALBUMIN SERPL-MCNC: 4.1 G/DL (ref 3.5–5.2)
ALBUMIN/GLOB SERPL: 1.4 {RATIO} (ref 1–2.5)
ALP SERPL-CCNC: 84 U/L (ref 40–129)
ALT SERPL-CCNC: 17 U/L (ref 5–41)
ANION GAP SERPL CALCULATED.3IONS-SCNC: 14 MMOL/L (ref 9–17)
AST SERPL-CCNC: 33 U/L
BILIRUB SERPL-MCNC: 0.6 MG/DL (ref 0.3–1.2)
BNP SERPL-MCNC: 4297 PG/ML
BUN SERPL-MCNC: 54 MG/DL (ref 8–23)
CALCIUM SERPL-MCNC: 10 MG/DL (ref 8.6–10.4)
CHLORIDE SERPL-SCNC: 105 MMOL/L (ref 98–107)
CO2 SERPL-SCNC: 23 MMOL/L (ref 20–31)
CREAT SERPL-MCNC: 3.5 MG/DL (ref 0.7–1.2)
ERYTHROCYTE [DISTWIDTH] IN BLOOD BY AUTOMATED COUNT: 14.5 % (ref 11.8–14.4)
GFR SERPL CREATININE-BSD FRML MDRD: 16 ML/MIN/1.73M2
GLUCOSE SERPL-MCNC: 98 MG/DL (ref 70–99)
HCT VFR BLD AUTO: 41.4 % (ref 40.7–50.3)
HGB BLD-MCNC: 13 G/DL (ref 13–17)
MCH RBC QN AUTO: 26.8 PG (ref 25.2–33.5)
MCHC RBC AUTO-ENTMCNC: 31.4 G/DL (ref 28.4–34.8)
MCV RBC AUTO: 85.4 FL (ref 82.6–102.9)
NRBC BLD-RTO: 0 PER 100 WBC
PLATELET # BLD AUTO: 147 K/UL (ref 138–453)
PMV BLD AUTO: 11.6 FL (ref 8.1–13.5)
POTASSIUM SERPL-SCNC: 4.2 MMOL/L (ref 3.7–5.3)
PROT SERPL-MCNC: 7 G/DL (ref 6.4–8.3)
RBC # BLD AUTO: 4.85 M/UL (ref 4.21–5.77)
SODIUM SERPL-SCNC: 142 MMOL/L (ref 135–144)
WBC OTHER # BLD: 3.6 K/UL (ref 3.5–11.3)

## 2023-08-11 LAB
ALBUMIN SERPL-MCNC: 4.1 G/DL (ref 3.5–5.2)
ALBUMIN/GLOB SERPL: 1.4 {RATIO} (ref 1–2.5)
ALP SERPL-CCNC: 84 U/L (ref 40–129)
ALT SERPL-CCNC: 17 U/L (ref 5–41)
ANION GAP SERPL CALCULATED.3IONS-SCNC: 14 MMOL/L (ref 9–17)
AST SERPL-CCNC: 33 U/L
BILIRUB SERPL-MCNC: 0.6 MG/DL (ref 0.3–1.2)
BUN SERPL-MCNC: 54 MG/DL (ref 8–23)
CALCIUM SERPL-MCNC: 10 MG/DL (ref 8.6–10.4)
CHLORIDE SERPL-SCNC: 105 MMOL/L (ref 98–107)
CO2 SERPL-SCNC: 23 MMOL/L (ref 20–31)
CREAT SERPL-MCNC: 3.5 MG/DL (ref 0.7–1.2)
GFR SERPL CREATININE-BSD FRML MDRD: 16 ML/MIN/1.73M2
GLUCOSE SERPL-MCNC: 98 MG/DL (ref 70–99)
MAGNESIUM SERPL-MCNC: 2.8 MG/DL (ref 1.6–2.6)
POTASSIUM SERPL-SCNC: 4.2 MMOL/L (ref 3.7–5.3)
PROT SERPL-MCNC: 7 G/DL (ref 6.4–8.3)
SODIUM SERPL-SCNC: 142 MMOL/L (ref 135–144)

## 2023-08-14 ENCOUNTER — TELEPHONE (OUTPATIENT)
Dept: INTERNAL MEDICINE CLINIC | Age: 88
End: 2023-08-14

## 2023-08-14 DIAGNOSIS — R79.0 ABNORMAL BLOOD LEVEL OF MAGNESIUM: Primary | ICD-10-CM

## 2023-08-14 NOTE — TELEPHONE ENCOUNTER
----- Message from Chaparrita Carmen MD sent at 8/11/2023  1:41 PM EDT -----  noted   ----- Message -----  From: Laura Porter MA  Sent: 8/11/2023   1:36 PM EDT  To: Chaparrita Caremn MD    Patient states he is not taking any magnesium supplements

## 2023-08-16 ENCOUNTER — HOSPITAL ENCOUNTER (OUTPATIENT)
Age: 88
Setting detail: SPECIMEN
Discharge: HOME | End: 2023-08-16

## 2023-08-16 DIAGNOSIS — R79.0 ABNORMAL BLOOD LEVEL OF MAGNESIUM: ICD-10-CM

## 2023-08-16 LAB — MAGNESIUM SERPL-MCNC: 2.5 MG/DL (ref 1.6–2.6)

## 2023-09-07 ENCOUNTER — TELEPHONE (OUTPATIENT)
Dept: INTERNAL MEDICINE CLINIC | Age: 88
End: 2023-09-07

## 2023-09-07 NOTE — TELEPHONE ENCOUNTER
Patient calling RE: cramps in his legs    Says that this was discussed at his 8/8/23, but has not got any better    Is asking what he can do for these cramps in his legs?     Please advise

## 2023-09-13 ENCOUNTER — TELEPHONE (OUTPATIENT)
Dept: INTERNAL MEDICINE CLINIC | Age: 88
End: 2023-09-13

## 2023-09-13 NOTE — TELEPHONE ENCOUNTER
Patient states he cannot walk long distances due to arthritis in knees and hips and would like a handicap placard.

## 2023-10-19 ENCOUNTER — HOSPITAL ENCOUNTER (OUTPATIENT)
Age: 88
Setting detail: SPECIMEN
Discharge: HOME | End: 2023-10-19

## 2023-10-19 LAB
BILIRUB UR QL STRIP: NEGATIVE
CLARITY UR: CLEAR
COLOR UR: YELLOW
COMMENT: NORMAL
CREAT UR-MCNC: 56.8 MG/DL (ref 39–259)
GLUCOSE UR STRIP-MCNC: NEGATIVE MG/DL
HCT VFR BLD AUTO: 41.8 % (ref 40.7–50.3)
HGB BLD-MCNC: 13.2 G/DL (ref 13–17)
HGB UR QL STRIP.AUTO: NEGATIVE
KETONES UR STRIP-MCNC: NEGATIVE MG/DL
LEUKOCYTE ESTERASE UR QL STRIP: NEGATIVE
NITRITE UR QL STRIP: NEGATIVE
PH UR STRIP: 6 [PH] (ref 5–8)
PROT UR STRIP-MCNC: NEGATIVE MG/DL
PTH-INTACT SERPL-MCNC: 196.8 PG/ML (ref 14–72)
SP GR UR STRIP: 1.01 (ref 1–1.03)
TOTAL PROTEIN, URINE: 11 MG/DL
URINE TOTAL PROTEIN CREATININE RATIO: 0.19 (ref 0–0.2)
UROBILINOGEN UR STRIP-ACNC: NORMAL EU/DL (ref 0–1)

## 2023-10-20 LAB
25(OH)D3 SERPL-MCNC: 48.6 NG/ML
ALBUMIN SERPL-MCNC: 3.8 G/DL (ref 3.5–5.2)
ALBUMIN/GLOB SERPL: 1.2 {RATIO} (ref 1–2.5)
ALP SERPL-CCNC: 92 U/L (ref 40–129)
ALT SERPL-CCNC: 18 U/L (ref 5–41)
ANION GAP SERPL CALCULATED.3IONS-SCNC: 13 MMOL/L (ref 9–17)
AST SERPL-CCNC: 33 U/L
BILIRUB SERPL-MCNC: 0.5 MG/DL (ref 0.3–1.2)
BUN SERPL-MCNC: 62 MG/DL (ref 8–23)
CALCIUM SERPL-MCNC: 9.6 MG/DL (ref 8.6–10.4)
CHLORIDE SERPL-SCNC: 97 MMOL/L (ref 98–107)
CO2 SERPL-SCNC: 27 MMOL/L (ref 20–31)
CREAT SERPL-MCNC: 3.3 MG/DL (ref 0.7–1.2)
GFR SERPL CREATININE-BSD FRML MDRD: 17 ML/MIN/1.73M2
GLUCOSE SERPL-MCNC: 103 MG/DL (ref 70–99)
PHOSPHATE SERPL-MCNC: 3.3 MG/DL (ref 2.5–4.5)
POTASSIUM SERPL-SCNC: 4 MMOL/L (ref 3.7–5.3)
PROT SERPL-MCNC: 6.9 G/DL (ref 6.4–8.3)
SODIUM SERPL-SCNC: 137 MMOL/L (ref 135–144)

## 2024-01-18 ENCOUNTER — HOSPITAL ENCOUNTER (OUTPATIENT)
Age: 89
Setting detail: SPECIMEN
Discharge: HOME | End: 2024-01-18

## 2024-01-18 LAB
ALBUMIN SERPL-MCNC: 4.1 G/DL (ref 3.5–5.2)
ALBUMIN/GLOB SERPL: 1.6 {RATIO} (ref 1–2.5)
ALP SERPL-CCNC: 99 U/L (ref 40–129)
ALT SERPL-CCNC: 16 U/L (ref 5–41)
ANION GAP SERPL CALCULATED.3IONS-SCNC: 11 MMOL/L (ref 9–17)
AST SERPL-CCNC: 32 U/L
BACTERIA URNS QL MICRO: NORMAL
BILIRUB SERPL-MCNC: 0.5 MG/DL (ref 0.3–1.2)
BILIRUB UR QL STRIP: NEGATIVE
BNP SERPL-MCNC: 6607 PG/ML
BUN SERPL-MCNC: 67 MG/DL (ref 8–23)
CALCIUM SERPL-MCNC: 9.8 MG/DL (ref 8.6–10.4)
CASTS #/AREA URNS LPF: NORMAL /LPF (ref 0–8)
CHLORIDE SERPL-SCNC: 102 MMOL/L (ref 98–107)
CHOLEST SERPL-MCNC: 125 MG/DL
CHOLESTEROL/HDL RATIO: 2.5
CK SERPL-CCNC: 337 U/L (ref 39–308)
CLARITY UR: CLEAR
CO2 SERPL-SCNC: 26 MMOL/L (ref 20–31)
COLOR UR: YELLOW
CREAT SERPL-MCNC: 3.7 MG/DL (ref 0.7–1.2)
CREAT UR-MCNC: 59.3 MG/DL (ref 39–259)
EPI CELLS #/AREA URNS HPF: NORMAL /HPF (ref 0–5)
ERYTHROCYTE [DISTWIDTH] IN BLOOD BY AUTOMATED COUNT: 14.3 % (ref 11.8–14.4)
EST. AVERAGE GLUCOSE BLD GHB EST-MCNC: 120 MG/DL
GFR SERPL CREATININE-BSD FRML MDRD: 15 ML/MIN/1.73M2
GLUCOSE P FAST SERPL-MCNC: 91 MG/DL (ref 70–99)
GLUCOSE UR STRIP-MCNC: NEGATIVE MG/DL
HBA1C MFR BLD: 5.8 % (ref 4–6)
HCT VFR BLD AUTO: 41.8 % (ref 40.7–50.3)
HDLC SERPL-MCNC: 51 MG/DL
HGB BLD-MCNC: 13.1 G/DL (ref 13–17)
HGB UR QL STRIP.AUTO: ABNORMAL
IRON SATN MFR SERPL: 32 % (ref 20–55)
IRON SERPL-MCNC: 71 UG/DL (ref 59–158)
KETONES UR STRIP-MCNC: NEGATIVE MG/DL
LDLC SERPL CALC-MCNC: 58 MG/DL (ref 0–130)
LEUKOCYTE ESTERASE UR QL STRIP: NEGATIVE
MAGNESIUM SERPL-MCNC: 2.5 MG/DL (ref 1.6–2.6)
MCH RBC QN AUTO: 27 PG (ref 25.2–33.5)
MCHC RBC AUTO-ENTMCNC: 31.3 G/DL (ref 28.4–34.8)
MCV RBC AUTO: 86.2 FL (ref 82.6–102.9)
NITRITE UR QL STRIP: NEGATIVE
NRBC BLD-RTO: 0 PER 100 WBC
PH UR STRIP: 6 [PH] (ref 5–8)
PHOSPHATE SERPL-MCNC: 3.2 MG/DL (ref 2.5–4.5)
PLATELET # BLD AUTO: 129 K/UL (ref 138–453)
PMV BLD AUTO: 11.4 FL (ref 8.1–13.5)
POTASSIUM SERPL-SCNC: 3.8 MMOL/L (ref 3.7–5.3)
PROT SERPL-MCNC: 6.7 G/DL (ref 6.4–8.3)
PROT UR STRIP-MCNC: ABNORMAL MG/DL
RBC # BLD AUTO: 4.85 M/UL (ref 4.21–5.77)
RBC #/AREA URNS HPF: NORMAL /HPF (ref 0–4)
SODIUM SERPL-SCNC: 139 MMOL/L (ref 135–144)
SP GR UR STRIP: 1.01 (ref 1–1.03)
T4 FREE SERPL-MCNC: 1.2 NG/DL (ref 0.9–1.7)
TIBC SERPL-MCNC: 220 UG/DL (ref 250–450)
TOTAL PROTEIN, URINE: 17 MG/DL
TRIGL SERPL-MCNC: 79 MG/DL
TSH SERPL DL<=0.05 MIU/L-ACNC: 4.87 UIU/ML (ref 0.3–5)
UNSATURATED IRON BINDING CAPACITY: 149 UG/DL (ref 112–347)
URINE TOTAL PROTEIN CREATININE RATIO: 0.28
UROBILINOGEN UR STRIP-ACNC: NORMAL EU/DL (ref 0–1)
VIT B12 SERPL-MCNC: 1096 PG/ML (ref 232–1245)
WBC #/AREA URNS HPF: NORMAL /HPF (ref 0–5)
WBC OTHER # BLD: 4.5 K/UL (ref 3.5–11.3)

## 2024-01-19 LAB
ALBUMIN SERPL-MCNC: 4.1 G/DL (ref 3.5–5.2)
ALBUMIN/GLOB SERPL: 1.6 {RATIO} (ref 1–2.5)
ALP SERPL-CCNC: 99 U/L (ref 40–129)
ALT SERPL-CCNC: 16 U/L (ref 5–41)
ANION GAP SERPL CALCULATED.3IONS-SCNC: 11 MMOL/L (ref 9–17)
AST SERPL-CCNC: 32 U/L
BILIRUB SERPL-MCNC: 0.5 MG/DL (ref 0.3–1.2)
BUN SERPL-MCNC: 67 MG/DL (ref 8–23)
CALCIUM SERPL-MCNC: 9.8 MG/DL (ref 8.6–10.4)
CHLORIDE SERPL-SCNC: 102 MMOL/L (ref 98–107)
CO2 SERPL-SCNC: 26 MMOL/L (ref 20–31)
CREAT SERPL-MCNC: 3.7 MG/DL (ref 0.7–1.2)
GFR SERPL CREATININE-BSD FRML MDRD: 15 ML/MIN/1.73M2
GLUCOSE P FAST SERPL-MCNC: 91 MG/DL (ref 70–99)
HCT VFR BLD AUTO: 41.8 % (ref 40.7–50.3)
HGB BLD-MCNC: 13.1 G/DL (ref 13–17)
PHOSPHATE SERPL-MCNC: 3.2 MG/DL (ref 2.5–4.5)
POTASSIUM SERPL-SCNC: 3.8 MMOL/L (ref 3.7–5.3)
PROT SERPL-MCNC: 6.7 G/DL (ref 6.4–8.3)
SODIUM SERPL-SCNC: 139 MMOL/L (ref 135–144)

## 2024-01-21 LAB
25(OH)D3 SERPL-MCNC: 46.3 NG/ML
PTH-INTACT SERPL-MCNC: 194.4 PG/ML (ref 14–72)

## 2024-01-31 ENCOUNTER — HOSPITAL ENCOUNTER (OUTPATIENT)
Age: 89
Setting detail: SPECIMEN
Discharge: HOME | End: 2024-01-31

## 2024-01-31 LAB
ANION GAP SERPL CALCULATED.3IONS-SCNC: 8 MMOL/L (ref 9–17)
BUN SERPL-MCNC: 41 MG/DL (ref 8–23)
CALCIUM SERPL-MCNC: 9.6 MG/DL (ref 8.6–10.4)
CHLORIDE SERPL-SCNC: 104 MMOL/L (ref 98–107)
CO2 SERPL-SCNC: 25 MMOL/L (ref 20–31)
CREAT SERPL-MCNC: 2.9 MG/DL (ref 0.7–1.2)
GFR SERPL CREATININE-BSD FRML MDRD: 20 ML/MIN/1.73M2
GLUCOSE SERPL-MCNC: 93 MG/DL (ref 70–99)
POTASSIUM SERPL-SCNC: 4.9 MMOL/L (ref 3.7–5.3)
SODIUM SERPL-SCNC: 137 MMOL/L (ref 135–144)

## 2024-02-01 ENCOUNTER — TELEPHONE (OUTPATIENT)
Dept: INTERNAL MEDICINE CLINIC | Age: 89
End: 2024-02-01

## 2024-02-01 NOTE — TELEPHONE ENCOUNTER
Patient called and stated he has loose stool and abdominal pain x 1 day. I explained that Dr Ceballos is not in office and that he could go to a [facility] clinic or  to get evaluated. I told him they could do testing if needed and examine him.Patient states he will think about which one he would like to go to.

## 2024-04-18 ENCOUNTER — HOSPITAL ENCOUNTER (OUTPATIENT)
Age: 89
Setting detail: SPECIMEN
Discharge: HOME | End: 2024-04-18

## 2024-04-18 LAB
25(OH)D3 SERPL-MCNC: 41.3 NG/ML (ref 30–100)
ALBUMIN SERPL-MCNC: 4.2 G/DL (ref 3.5–5.2)
ALBUMIN/GLOB SERPL: 2 {RATIO} (ref 1–2.5)
ALP SERPL-CCNC: 80 U/L (ref 40–129)
ALT SERPL-CCNC: 13 U/L (ref 10–50)
ANION GAP SERPL CALCULATED.3IONS-SCNC: 11 MMOL/L (ref 9–16)
AST SERPL-CCNC: 33 U/L (ref 10–50)
BILIRUB SERPL-MCNC: 0.4 MG/DL (ref 0–1.2)
BILIRUB UR QL STRIP: NEGATIVE
BUN SERPL-MCNC: 48 MG/DL (ref 8–23)
CALCIUM SERPL-MCNC: 9.6 MG/DL (ref 8.6–10.4)
CHLORIDE SERPL-SCNC: 102 MMOL/L (ref 98–107)
CLARITY UR: CLEAR
CO2 SERPL-SCNC: 26 MMOL/L (ref 20–31)
COLOR UR: YELLOW
COMMENT: NORMAL
CREAT SERPL-MCNC: 2.9 MG/DL (ref 0.7–1.2)
CREAT UR-MCNC: 46.3 MG/DL (ref 39–259)
GFR SERPL CREATININE-BSD FRML MDRD: 19 ML/MIN/1.73M2
GLUCOSE SERPL-MCNC: 96 MG/DL (ref 74–99)
GLUCOSE UR STRIP-MCNC: NEGATIVE MG/DL
HCT VFR BLD AUTO: 37.8 % (ref 40.7–50.3)
HGB BLD-MCNC: 11.8 G/DL (ref 13–17)
HGB UR QL STRIP.AUTO: NEGATIVE
KETONES UR STRIP-MCNC: NEGATIVE MG/DL
LEUKOCYTE ESTERASE UR QL STRIP: NEGATIVE
NITRITE UR QL STRIP: NEGATIVE
PH UR STRIP: 5.5 [PH] (ref 5–8)
PHOSPHATE SERPL-MCNC: 3 MG/DL (ref 2.5–4.5)
POTASSIUM SERPL-SCNC: 4.2 MMOL/L (ref 3.7–5.3)
PROT SERPL-MCNC: 6.8 G/DL (ref 6.6–8.7)
PROT UR STRIP-MCNC: NEGATIVE MG/DL
PTH-INTACT SERPL-MCNC: 199 PG/ML (ref 15–65)
SODIUM SERPL-SCNC: 139 MMOL/L (ref 136–145)
SP GR UR STRIP: 1.01 (ref 1–1.03)
TOTAL PROTEIN, URINE: 8 MG/DL
URINE TOTAL PROTEIN CREATININE RATIO: 0.18
UROBILINOGEN UR STRIP-ACNC: NORMAL EU/DL (ref 0–1)

## 2024-04-30 ENCOUNTER — OFFICE VISIT (OUTPATIENT)
Dept: INTERNAL MEDICINE CLINIC | Age: 89
End: 2024-04-30
Payer: MEDICARE

## 2024-04-30 VITALS
SYSTOLIC BLOOD PRESSURE: 110 MMHG | OXYGEN SATURATION: 95 % | HEART RATE: 68 BPM | BODY MASS INDEX: 24.35 KG/M2 | HEIGHT: 69 IN | TEMPERATURE: 97.3 F | DIASTOLIC BLOOD PRESSURE: 60 MMHG | WEIGHT: 164.4 LBS

## 2024-04-30 DIAGNOSIS — M25.512 CHRONIC LEFT SHOULDER PAIN: ICD-10-CM

## 2024-04-30 DIAGNOSIS — G89.29 CHRONIC LEFT SHOULDER PAIN: ICD-10-CM

## 2024-04-30 DIAGNOSIS — N18.31 STAGE 3A CHRONIC KIDNEY DISEASE (HCC): ICD-10-CM

## 2024-04-30 DIAGNOSIS — R25.2 MUSCLE CRAMPS: ICD-10-CM

## 2024-04-30 DIAGNOSIS — I10 ESSENTIAL HYPERTENSION: Primary | ICD-10-CM

## 2024-04-30 PROCEDURE — G8427 DOCREV CUR MEDS BY ELIG CLIN: HCPCS | Performed by: INTERNAL MEDICINE

## 2024-04-30 PROCEDURE — 99214 OFFICE O/P EST MOD 30 MIN: CPT | Performed by: INTERNAL MEDICINE

## 2024-04-30 PROCEDURE — 1036F TOBACCO NON-USER: CPT | Performed by: INTERNAL MEDICINE

## 2024-04-30 PROCEDURE — 1123F ACP DISCUSS/DSCN MKR DOCD: CPT | Performed by: INTERNAL MEDICINE

## 2024-04-30 PROCEDURE — G8420 CALC BMI NORM PARAMETERS: HCPCS | Performed by: INTERNAL MEDICINE

## 2024-04-30 ASSESSMENT — PATIENT HEALTH QUESTIONNAIRE - PHQ9
SUM OF ALL RESPONSES TO PHQ QUESTIONS 1-9: 0
SUM OF ALL RESPONSES TO PHQ9 QUESTIONS 1 & 2: 0
2. FEELING DOWN, DEPRESSED OR HOPELESS: NOT AT ALL
SUM OF ALL RESPONSES TO PHQ QUESTIONS 1-9: 0
1. LITTLE INTEREST OR PLEASURE IN DOING THINGS: NOT AT ALL
SUM OF ALL RESPONSES TO PHQ QUESTIONS 1-9: 0
SUM OF ALL RESPONSES TO PHQ QUESTIONS 1-9: 0

## 2024-04-30 NOTE — PROGRESS NOTES
current facility-administered medications for this visit.       Allergies   Allergen Reactions    Ace Inhibitors     Other      Beta-blockers slow heart rate    Timolol      Eye drops slow heart rate       Past Medical History:   Diagnosis Date    BPH (benign prostatic hyperplasia)     secondary to reynolds    CAD (coronary artery disease) 1/18/2005    CRF (chronic renal failure) 01/01    patient denies kidney problems at pre-testing 2015    DVD (dissociated vertical deviation)     Gout     H/O cardiac catheterization 1/18/05    stents     HTN (hypertension)     Hx of blood clots     left leg (over 23 yrs ago)    Hypercholesteremia     Impotence     Irregular heartbeat     Nodular prostate     -bx Dr. montoya 8/96 and focal inflammation 6/97    OA (osteoarthritis) of knee     Prostate cancer (HCC) 1/06    Unspecified sleep apnea     no machine       Past Surgical History:   Procedure Laterality Date    CORONARY ANGIOPLASTY      CYSTOPLASTY N/A     urethral dilation 10/93 Dr Montoya    EYE SURGERY Left     cataract    HAND SURGERY Right 1/08/15    Dr. roldan did right ring and little finger metacarpophalangeal joint extension    JOINT REPLACEMENT      PROSTATE SURGERY      seed implant    REVISION TOTAL HIP ARTHROPLASTY Left 05/07    Dr Garcia    TONSILLECTOMY      TOTAL HIP ARTHROPLASTY Right 10/20/2015       Family History   Problem Relation Age of Onset    Coronary Art Dis Mother     Hypertension Mother     Cancer Brother      ROS  Constitutional:  Negative for fatigue, loss of appetite and unexpected weight change  HEENT            : Negative for neck stiffness and pain, no congestion or sinus pressure  Eyes                : No visual disturbance or pain  Cardiovascular: No chest pain or palpitations or leg swelling  Respiratory      : Negative for cough, shortness of breath or wheezing  Gastrointestinal: Negative for abdominal pain, constipation or diarrhea and bloating No nausea or vomiting  Genitourinary:     No urgency

## 2024-07-26 ENCOUNTER — HOSPITAL ENCOUNTER (OUTPATIENT)
Age: 89
Setting detail: SPECIMEN
Discharge: HOME | End: 2024-07-26

## 2024-07-26 LAB
25(OH)D3 SERPL-MCNC: 43.7 NG/ML (ref 30–100)
ALBUMIN SERPL-MCNC: 4 G/DL (ref 3.5–5.2)
ALBUMIN SERPL-MCNC: 4.1 G/DL (ref 3.5–5.2)
ALBUMIN/GLOB SERPL: 2 {RATIO} (ref 1–2.5)
ALBUMIN/GLOB SERPL: 2 {RATIO} (ref 1–2.5)
ALP SERPL-CCNC: 89 U/L (ref 40–129)
ALP SERPL-CCNC: 89 U/L (ref 40–129)
ALT SERPL-CCNC: 19 U/L (ref 10–50)
ALT SERPL-CCNC: 19 U/L (ref 10–50)
ANION GAP SERPL CALCULATED.3IONS-SCNC: 13 MMOL/L (ref 9–16)
ANION GAP SERPL CALCULATED.3IONS-SCNC: 15 MMOL/L (ref 9–16)
AST SERPL-CCNC: 35 U/L (ref 10–50)
AST SERPL-CCNC: 38 U/L (ref 10–50)
BILIRUB SERPL-MCNC: 0.6 MG/DL (ref 0–1.2)
BILIRUB SERPL-MCNC: 0.6 MG/DL (ref 0–1.2)
BILIRUB UR QL STRIP: NEGATIVE
BNP SERPL-MCNC: 7526 PG/ML (ref 0–300)
BUN SERPL-MCNC: 59 MG/DL (ref 8–23)
BUN SERPL-MCNC: 60 MG/DL (ref 8–23)
CALCIUM SERPL-MCNC: 9.6 MG/DL (ref 8.6–10.4)
CALCIUM SERPL-MCNC: 9.7 MG/DL (ref 8.6–10.4)
CHLORIDE SERPL-SCNC: 100 MMOL/L (ref 98–107)
CHLORIDE SERPL-SCNC: 100 MMOL/L (ref 98–107)
CLARITY UR: CLEAR
CO2 SERPL-SCNC: 22 MMOL/L (ref 20–31)
CO2 SERPL-SCNC: 24 MMOL/L (ref 20–31)
COLOR UR: YELLOW
COMMENT: NORMAL
CREAT SERPL-MCNC: 3.6 MG/DL (ref 0.7–1.2)
CREAT SERPL-MCNC: 3.6 MG/DL (ref 0.7–1.2)
CREAT UR-MCNC: 75 MG/DL (ref 39–259)
ERYTHROCYTE [DISTWIDTH] IN BLOOD BY AUTOMATED COUNT: 15.1 % (ref 11.8–14.4)
GFR, ESTIMATED: 15 ML/MIN/1.73M2
GFR, ESTIMATED: 15 ML/MIN/1.73M2
GLUCOSE SERPL-MCNC: 90 MG/DL (ref 74–99)
GLUCOSE SERPL-MCNC: 94 MG/DL (ref 74–99)
GLUCOSE UR STRIP-MCNC: NEGATIVE MG/DL
HCT VFR BLD AUTO: 37.4 % (ref 40.7–50.3)
HCT VFR BLD AUTO: 37.4 % (ref 40.7–50.3)
HGB BLD-MCNC: 11.9 G/DL (ref 13–17)
HGB BLD-MCNC: 11.9 G/DL (ref 13–17)
HGB UR QL STRIP.AUTO: NEGATIVE
KETONES UR STRIP-MCNC: NEGATIVE MG/DL
LEUKOCYTE ESTERASE UR QL STRIP: NEGATIVE
MCH RBC QN AUTO: 26.4 PG (ref 25.2–33.5)
MCHC RBC AUTO-ENTMCNC: 31.8 G/DL (ref 28.4–34.8)
MCV RBC AUTO: 82.9 FL (ref 82.6–102.9)
NITRITE UR QL STRIP: NEGATIVE
NRBC BLD-RTO: 0 PER 100 WBC
PH UR STRIP: 6 [PH] (ref 5–8)
PHOSPHATE SERPL-MCNC: 3.4 MG/DL (ref 2.5–4.5)
PLATELET # BLD AUTO: 133 K/UL (ref 138–453)
PMV BLD AUTO: 11.3 FL (ref 8.1–13.5)
POTASSIUM SERPL-SCNC: 4.7 MMOL/L (ref 3.7–5.3)
POTASSIUM SERPL-SCNC: 5 MMOL/L (ref 3.7–5.3)
PROT SERPL-MCNC: 6.6 G/DL (ref 6.6–8.7)
PROT SERPL-MCNC: 6.7 G/DL (ref 6.6–8.7)
PROT UR STRIP-MCNC: NEGATIVE MG/DL
PTH-INTACT SERPL-MCNC: 222 PG/ML (ref 15–65)
RBC # BLD AUTO: 4.51 M/UL (ref 4.21–5.77)
SODIUM SERPL-SCNC: 137 MMOL/L (ref 136–145)
SODIUM SERPL-SCNC: 137 MMOL/L (ref 136–145)
SP GR UR STRIP: 1.01 (ref 1–1.03)
TOTAL PROTEIN, URINE: 9 MG/DL
URINE TOTAL PROTEIN CREATININE RATIO: 0.12
UROBILINOGEN UR STRIP-ACNC: NORMAL EU/DL (ref 0–1)
WBC OTHER # BLD: 4.1 K/UL (ref 3.5–11.3)

## 2024-10-04 ENCOUNTER — HOSPITAL ENCOUNTER (OUTPATIENT)
Age: 89
Setting detail: SPECIMEN
Discharge: HOME | End: 2024-10-04

## 2024-10-04 LAB
25(OH)D3 SERPL-MCNC: 48.8 NG/ML (ref 30–100)
ALBUMIN SERPL-MCNC: 4.1 G/DL (ref 3.5–5.2)
ALBUMIN/GLOB SERPL: 2 {RATIO} (ref 1–2.5)
ALP SERPL-CCNC: 91 U/L (ref 40–129)
ALT SERPL-CCNC: 29 U/L (ref 10–50)
ANION GAP SERPL CALCULATED.3IONS-SCNC: 8 MMOL/L (ref 9–16)
AST SERPL-CCNC: 39 U/L (ref 10–50)
BILIRUB SERPL-MCNC: 0.6 MG/DL (ref 0–1.2)
BILIRUB UR QL STRIP: NEGATIVE
BUN SERPL-MCNC: 55 MG/DL (ref 8–23)
CALCIUM SERPL-MCNC: 9.5 MG/DL (ref 8.6–10.4)
CHLORIDE SERPL-SCNC: 105 MMOL/L (ref 98–107)
CLARITY UR: CLEAR
CO2 SERPL-SCNC: 26 MMOL/L (ref 20–31)
COLOR UR: YELLOW
COMMENT: NORMAL
CREAT SERPL-MCNC: 3.6 MG/DL (ref 0.7–1.2)
CREAT UR-MCNC: 59.1 MG/DL (ref 39–259)
GFR, ESTIMATED: 15 ML/MIN/1.73M2
GLUCOSE SERPL-MCNC: 67 MG/DL (ref 74–99)
GLUCOSE UR STRIP-MCNC: NEGATIVE MG/DL
HCT VFR BLD AUTO: 42.3 % (ref 40.7–50.3)
HGB BLD-MCNC: 12.9 G/DL (ref 13–17)
HGB UR QL STRIP.AUTO: NEGATIVE
KETONES UR STRIP-MCNC: NEGATIVE MG/DL
LEUKOCYTE ESTERASE UR QL STRIP: NEGATIVE
NITRITE UR QL STRIP: NEGATIVE
PH UR STRIP: 6 [PH] (ref 5–8)
PHOSPHATE SERPL-MCNC: 3 MG/DL (ref 2.5–4.5)
POTASSIUM SERPL-SCNC: 4.9 MMOL/L (ref 3.7–5.3)
PROT SERPL-MCNC: 6.7 G/DL (ref 6.6–8.7)
PROT UR STRIP-MCNC: NEGATIVE MG/DL
PTH-INTACT SERPL-MCNC: 248 PG/ML (ref 15–65)
SODIUM SERPL-SCNC: 139 MMOL/L (ref 136–145)
SP GR UR STRIP: 1.01 (ref 1–1.03)
TOTAL PROTEIN, URINE: 11 MG/DL
URINE TOTAL PROTEIN CREATININE RATIO: 0.18
UROBILINOGEN UR STRIP-ACNC: NORMAL EU/DL (ref 0–1)

## 2024-10-30 ENCOUNTER — OFFICE VISIT (OUTPATIENT)
Dept: FAMILY MEDICINE CLINIC | Age: 89
End: 2024-10-30

## 2024-10-30 VITALS
SYSTOLIC BLOOD PRESSURE: 104 MMHG | BODY MASS INDEX: 23.76 KG/M2 | DIASTOLIC BLOOD PRESSURE: 60 MMHG | WEIGHT: 161 LBS | TEMPERATURE: 97.5 F

## 2024-10-30 DIAGNOSIS — I73.9 PERIPHERAL VASCULAR DISEASE (HCC): ICD-10-CM

## 2024-10-30 DIAGNOSIS — I10 PRIMARY HYPERTENSION: Primary | ICD-10-CM

## 2024-10-30 DIAGNOSIS — C61 PROSTATE CANCER (HCC): ICD-10-CM

## 2024-10-30 DIAGNOSIS — D69.6 THROMBOCYTOPENIA, UNSPECIFIED (HCC): ICD-10-CM

## 2024-10-30 DIAGNOSIS — M25.572 LEFT ANKLE PAIN, UNSPECIFIED CHRONICITY: ICD-10-CM

## 2024-10-30 DIAGNOSIS — N18.4 CHRONIC KIDNEY DISEASE, STAGE IV (SEVERE) (HCC): ICD-10-CM

## 2024-10-30 DIAGNOSIS — I50.9 CONGESTIVE HEART FAILURE, UNSPECIFIED HF CHRONICITY, UNSPECIFIED HEART FAILURE TYPE (HCC): ICD-10-CM

## 2024-10-30 RX ORDER — NITROGLYCERIN 0.4 MG/1
0.4 TABLET SUBLINGUAL
COMMUNITY
Start: 2024-02-03

## 2024-10-30 SDOH — ECONOMIC STABILITY: FOOD INSECURITY: WITHIN THE PAST 12 MONTHS, YOU WORRIED THAT YOUR FOOD WOULD RUN OUT BEFORE YOU GOT MONEY TO BUY MORE.: NEVER TRUE

## 2024-10-30 SDOH — ECONOMIC STABILITY: INCOME INSECURITY: HOW HARD IS IT FOR YOU TO PAY FOR THE VERY BASICS LIKE FOOD, HOUSING, MEDICAL CARE, AND HEATING?: NOT HARD AT ALL

## 2024-10-30 SDOH — ECONOMIC STABILITY: FOOD INSECURITY: WITHIN THE PAST 12 MONTHS, THE FOOD YOU BOUGHT JUST DIDN'T LAST AND YOU DIDN'T HAVE MONEY TO GET MORE.: NEVER TRUE

## 2024-10-30 NOTE — PROGRESS NOTES
Preston Oliver is a 89 y.o. male who presents for   Chief Complaint   Patient presents with    Hypertension    Ankle Pain    and follow up of chronic medical problems.  Patient Active Problem List   Diagnosis    Sleep apnea    Prostate cancer (HCC)    CRF (chronic renal failure)    OA (osteoarthritis) of knee    Hypercholesteremia    Impotence    HTN (hypertension)    Closed fracture of shaft of metacarpal bone    Primary osteoarthritis of right hip    Peripheral vascular disease (HCC)    Chronic kidney disease, stage IV (severe) (HCC)    Congestive heart failure, unspecified HF chronicity, unspecified heart failure type (HCC)    Chronic renal disease, stage III (HCC) [143275]    Thrombocytopenia, unspecified (HCC)     HPI  Here for follow-up on blood pressure denies any new complaints    Current Outpatient Medications   Medication Sig Dispense Refill    nitroGLYCERIN (NITROSTAT) 0.4 MG SL tablet Place 1 tablet under the tongue      Nutritional Supplements (NUTRITIONAL DRINK) LIQD Take 1 each by mouth 2 times daily 1 can twice a day 237 mL 5    diclofenac sodium (VOLTAREN) 1 % GEL Apply 4 g topically in the morning and at bedtime 20 g 0    spironolactone (ALDACTONE) 25 MG tablet Take 1 tablet by mouth daily (Patient taking differently: Take 2 tablets by mouth daily One tablet daily) 90 tablet 1    bisoprolol (ZEBETA) 5 MG tablet Take 1 tablet by mouth daily      bumetanide (BUMEX) 2 MG tablet take 1 tablet by mouth twice a day      apixaban (ELIQUIS) 2.5 MG TABS tablet Take 1 tablet by mouth 2 times daily 7 tablet 0    famotidine (PEPCID) 20 MG tablet Take 1 tablet by mouth daily 7 tablet 0    rosuvastatin (CRESTOR) 40 MG tablet Take 1 tablet by mouth every evening 90 tablet 3    aspirin 81 MG tablet Take 1 tablet by mouth daily (Patient taking differently: Take 1 tablet by mouth three times a week) 30 tablet 2     No current facility-administered medications for this visit.       Allergies   Allergen Reactions

## 2024-11-06 ENCOUNTER — HOSPITAL ENCOUNTER (OUTPATIENT)
Age: 89
Setting detail: SPECIMEN
Discharge: HOME | End: 2024-11-06

## 2024-11-06 LAB
ANION GAP SERPL CALCULATED.3IONS-SCNC: 10 MMOL/L (ref 9–16)
BUN SERPL-MCNC: 58 MG/DL (ref 8–23)
CALCIUM SERPL-MCNC: 10 MG/DL (ref 8.6–10.4)
CHLORIDE SERPL-SCNC: 104 MMOL/L (ref 98–107)
CO2 SERPL-SCNC: 26 MMOL/L (ref 20–31)
CREAT SERPL-MCNC: 3.3 MG/DL (ref 0.7–1.2)
GFR, ESTIMATED: 17 ML/MIN/1.73M2
GLUCOSE SERPL-MCNC: 82 MG/DL (ref 74–99)
POTASSIUM SERPL-SCNC: 4.4 MMOL/L (ref 3.7–5.3)
PTH-INTACT SERPL-MCNC: 186 PG/ML (ref 15–65)
SODIUM SERPL-SCNC: 140 MMOL/L (ref 136–145)

## 2024-12-27 ENCOUNTER — HOSPITAL ENCOUNTER (OUTPATIENT)
Age: 88
Setting detail: SPECIMEN
Discharge: HOME | End: 2024-12-27

## 2024-12-27 LAB
25(OH)D3 SERPL-MCNC: 46.5 NG/ML (ref 30–100)
ALBUMIN SERPL-MCNC: 4.1 G/DL (ref 3.5–5.2)
ALBUMIN/GLOB SERPL: 1.6 {RATIO} (ref 1–2.5)
ALP SERPL-CCNC: 94 U/L (ref 40–129)
ALT SERPL-CCNC: 34 U/L (ref 10–50)
ANION GAP SERPL CALCULATED.3IONS-SCNC: 10 MMOL/L (ref 9–16)
AST SERPL-CCNC: 45 U/L (ref 10–50)
BACTERIA URNS QL MICRO: NORMAL
BILIRUB SERPL-MCNC: 0.6 MG/DL (ref 0–1.2)
BILIRUB UR QL STRIP: NEGATIVE
BUN SERPL-MCNC: 65 MG/DL (ref 8–23)
CALCIUM SERPL-MCNC: 9.9 MG/DL (ref 8.6–10.4)
CASTS #/AREA URNS LPF: NORMAL /LPF (ref 0–8)
CHLORIDE SERPL-SCNC: 104 MMOL/L (ref 98–107)
CLARITY UR: CLEAR
CO2 SERPL-SCNC: 24 MMOL/L (ref 20–31)
COLOR UR: YELLOW
CREAT SERPL-MCNC: 3.3 MG/DL (ref 0.7–1.2)
CREAT UR-MCNC: 61 MG/DL (ref 39–259)
EPI CELLS #/AREA URNS HPF: NORMAL /HPF (ref 0–5)
GFR, ESTIMATED: 17 ML/MIN/1.73M2
GLUCOSE SERPL-MCNC: 90 MG/DL (ref 74–99)
GLUCOSE UR STRIP-MCNC: NEGATIVE MG/DL
HCT VFR BLD AUTO: 41.5 % (ref 40.7–50.3)
HGB BLD-MCNC: 12.7 G/DL (ref 13–17)
HGB UR QL STRIP.AUTO: ABNORMAL
KETONES UR STRIP-MCNC: NEGATIVE MG/DL
LEUKOCYTE ESTERASE UR QL STRIP: NEGATIVE
NITRITE UR QL STRIP: NEGATIVE
PH UR STRIP: 5.5 [PH] (ref 5–8)
PHOSPHATE SERPL-MCNC: 3 MG/DL (ref 2.5–4.5)
POTASSIUM SERPL-SCNC: 4.3 MMOL/L (ref 3.7–5.3)
PROT SERPL-MCNC: 6.6 G/DL (ref 6.6–8.7)
PROT UR STRIP-MCNC: NEGATIVE MG/DL
PTH-INTACT SERPL-MCNC: 200 PG/ML (ref 15–65)
RBC #/AREA URNS HPF: NORMAL /HPF (ref 0–4)
SODIUM SERPL-SCNC: 138 MMOL/L (ref 136–145)
SP GR UR STRIP: 1.01 (ref 1–1.03)
TOTAL PROTEIN, URINE: 9 MG/DL
URINE TOTAL PROTEIN CREATININE RATIO: 0.15 (ref 0–0.2)
UROBILINOGEN UR STRIP-ACNC: NORMAL EU/DL (ref 0–1)
WBC #/AREA URNS HPF: NORMAL /HPF (ref 0–5)

## 2025-02-03 ENCOUNTER — HOSPITAL ENCOUNTER (OUTPATIENT)
Age: 89
Setting detail: SPECIMEN
Discharge: HOME | End: 2025-02-03

## 2025-02-03 LAB
ALBUMIN SERPL-MCNC: 3.9 G/DL (ref 3.5–5.2)
ALBUMIN/GLOB SERPL: 1.3 {RATIO} (ref 1–2.5)
ALP SERPL-CCNC: 94 U/L (ref 40–129)
ALT SERPL-CCNC: 34 U/L (ref 10–50)
ANION GAP SERPL CALCULATED.3IONS-SCNC: 11 MMOL/L (ref 9–16)
AST SERPL-CCNC: 43 U/L (ref 10–50)
BILIRUB SERPL-MCNC: 0.7 MG/DL (ref 0–1.2)
BNP SERPL-MCNC: ABNORMAL PG/ML (ref 0–450)
BUN SERPL-MCNC: 57 MG/DL (ref 8–23)
CALCIUM SERPL-MCNC: 10 MG/DL (ref 8.6–10.4)
CHLORIDE SERPL-SCNC: 105 MMOL/L (ref 98–107)
CHOLEST SERPL-MCNC: 122 MG/DL (ref 0–199)
CHOLESTEROL/HDL RATIO: 2.4
CK SERPL-CCNC: 334 U/L (ref 39–308)
CO2 SERPL-SCNC: 26 MMOL/L (ref 20–31)
CREAT SERPL-MCNC: 3.5 MG/DL (ref 0.7–1.2)
ERYTHROCYTE [DISTWIDTH] IN BLOOD BY AUTOMATED COUNT: 15.9 % (ref 11.8–14.4)
EST. AVERAGE GLUCOSE BLD GHB EST-MCNC: 126 MG/DL
GFR, ESTIMATED: 16 ML/MIN/1.73M2
GLUCOSE P FAST SERPL-MCNC: 84 MG/DL (ref 74–99)
HBA1C MFR BLD: 6 % (ref 4–6)
HCT VFR BLD AUTO: 41.2 % (ref 40.7–50.3)
HDLC SERPL-MCNC: 51 MG/DL
HGB BLD-MCNC: 12.8 G/DL (ref 13–17)
IRON SATN MFR SERPL: 33 % (ref 20–55)
IRON SERPL-MCNC: 81 UG/DL (ref 61–157)
LDLC SERPL CALC-MCNC: 61 MG/DL (ref 0–100)
MCH RBC QN AUTO: 25.4 PG (ref 25.2–33.5)
MCHC RBC AUTO-ENTMCNC: 31.1 G/DL (ref 28.4–34.8)
MCV RBC AUTO: 81.9 FL (ref 82.6–102.9)
NRBC BLD-RTO: 0 PER 100 WBC
PLATELET # BLD AUTO: 140 K/UL (ref 138–453)
PMV BLD AUTO: 11.7 FL (ref 8.1–13.5)
POTASSIUM SERPL-SCNC: 4.5 MMOL/L (ref 3.7–5.3)
PROT SERPL-MCNC: 6.8 G/DL (ref 6.6–8.7)
RBC # BLD AUTO: 5.03 M/UL (ref 4.21–5.77)
SODIUM SERPL-SCNC: 142 MMOL/L (ref 136–145)
T4 FREE SERPL-MCNC: 1.2 NG/DL (ref 0.92–1.68)
TIBC SERPL-MCNC: 249 UG/DL (ref 250–450)
TRIGL SERPL-MCNC: 49 MG/DL (ref 0–149)
TSH SERPL DL<=0.05 MIU/L-ACNC: 4.66 UIU/ML (ref 0.27–4.2)
UNSATURATED IRON BINDING CAPACITY: 168 UG/DL (ref 112–347)
VIT B12 SERPL-MCNC: 975 PG/ML (ref 232–1245)
VLDLC SERPL CALC-MCNC: 10 MG/DL (ref 1–30)
WBC OTHER # BLD: 3.3 K/UL (ref 3.5–11.3)

## 2025-02-24 ENCOUNTER — TELEMEDICINE (OUTPATIENT)
Dept: FAMILY MEDICINE CLINIC | Age: 89
End: 2025-02-24
Payer: MEDICARE

## 2025-02-24 DIAGNOSIS — C61 PROSTATE CANCER (HCC): ICD-10-CM

## 2025-02-24 DIAGNOSIS — N18.4 CHRONIC KIDNEY DISEASE, STAGE IV (SEVERE) (HCC): ICD-10-CM

## 2025-02-24 DIAGNOSIS — Z00.00 MEDICARE ANNUAL WELLNESS VISIT, SUBSEQUENT: Primary | ICD-10-CM

## 2025-02-24 DIAGNOSIS — I50.9 CONGESTIVE HEART FAILURE, UNSPECIFIED HF CHRONICITY, UNSPECIFIED HEART FAILURE TYPE (HCC): ICD-10-CM

## 2025-02-24 DIAGNOSIS — Z23 NEED FOR PROPHYLACTIC VACCINATION AND INOCULATION AGAINST VARICELLA: ICD-10-CM

## 2025-02-24 DIAGNOSIS — H61.20 WAX IN EAR: ICD-10-CM

## 2025-02-24 PROCEDURE — 1160F RVW MEDS BY RX/DR IN RCRD: CPT | Performed by: INTERNAL MEDICINE

## 2025-02-24 PROCEDURE — G0439 PPPS, SUBSEQ VISIT: HCPCS | Performed by: INTERNAL MEDICINE

## 2025-02-24 PROCEDURE — 1123F ACP DISCUSS/DSCN MKR DOCD: CPT | Performed by: INTERNAL MEDICINE

## 2025-02-24 PROCEDURE — 1159F MED LIST DOCD IN RCRD: CPT | Performed by: INTERNAL MEDICINE

## 2025-02-24 SDOH — ECONOMIC STABILITY: FOOD INSECURITY: WITHIN THE PAST 12 MONTHS, YOU WORRIED THAT YOUR FOOD WOULD RUN OUT BEFORE YOU GOT MONEY TO BUY MORE.: PATIENT DECLINED

## 2025-02-24 SDOH — ECONOMIC STABILITY: FOOD INSECURITY: WITHIN THE PAST 12 MONTHS, THE FOOD YOU BOUGHT JUST DIDN'T LAST AND YOU DIDN'T HAVE MONEY TO GET MORE.: PATIENT DECLINED

## 2025-02-24 ASSESSMENT — PATIENT HEALTH QUESTIONNAIRE - PHQ9
SUM OF ALL RESPONSES TO PHQ QUESTIONS 1-9: 0
SUM OF ALL RESPONSES TO PHQ QUESTIONS 1-9: 0
2. FEELING DOWN, DEPRESSED OR HOPELESS: NOT AT ALL
SUM OF ALL RESPONSES TO PHQ QUESTIONS 1-9: 0
1. LITTLE INTEREST OR PLEASURE IN DOING THINGS: NOT AT ALL
SUM OF ALL RESPONSES TO PHQ QUESTIONS 1-9: 0
SUM OF ALL RESPONSES TO PHQ9 QUESTIONS 1 & 2: 0

## 2025-02-24 ASSESSMENT — LIFESTYLE VARIABLES
HOW MANY STANDARD DRINKS CONTAINING ALCOHOL DO YOU HAVE ON A TYPICAL DAY: 1 OR 2
HOW OFTEN DO YOU HAVE A DRINK CONTAINING ALCOHOL: 2-4 TIMES A MONTH

## 2025-02-24 NOTE — PROGRESS NOTES
Medicare Annual Wellness Visit    Preston Oliver is here for Medicare AWV    Assessment & Plan   Medicare annual wellness visit, subsequent  Need for prophylactic vaccination and inoculation against varicella  -     zoster recombinant adjuvanted vaccine (SHINGRIX) 50 MCG/0.5ML SUSR injection; Inject 0.5 mLs into the muscle once for 1 dose, Disp-0.5 mL, R-0Print     No follow-ups on file.     Subjective   The following acute and/or chronic problems were also addressed today:  Advised patient about shingles vaccine and RSV  Also patient requesting a referral to see ENT for ear cleaning and referral done    Patient's complete Health Risk Assessment and screening values have been reviewed and are found in Flowsheets. The following problems were reviewed today and where indicated follow up appointments were made and/or referrals ordered.                    Hearing Screen:  Do you or your family notice any trouble with your hearing that hasn't been managed with hearing aids?: (!) Yes    Interventions:  Referred to ENT               Objective    Patient-Reported Vitals  No data recorded               Allergies   Allergen Reactions    Ace Inhibitors     Other      Beta-blockers slow heart rate    Timolol      Eye drops slow heart rate     Prior to Visit Medications    Medication Sig Taking? Authorizing Provider   zoster recombinant adjuvanted vaccine (SHINGRIX) 50 MCG/0.5ML SUSR injection Inject 0.5 mLs into the muscle once for 1 dose Yes Janie Ceballos MD   spironolactone (ALDACTONE) 25 MG tablet Take 1 tablet by mouth daily  Patient taking differently: Take 2 tablets by mouth daily One tablet daily Yes Janie Ceballos MD   bisoprolol (ZEBETA) 5 MG tablet Take 1 tablet by mouth daily Yes ProviderJose M MD   apixaban (ELIQUIS) 2.5 MG TABS tablet Take 1 tablet by mouth 2 times daily Yes Janie Ceballos MD   famotidine (PEPCID) 20 MG tablet Take 1 tablet by mouth daily Yes Janie Ceballos MD

## 2025-04-01 ENCOUNTER — HOSPITAL ENCOUNTER (OUTPATIENT)
Age: 89
Setting detail: SPECIMEN
Discharge: HOME | End: 2025-04-01

## 2025-04-01 LAB
ALBUMIN SERPL-MCNC: 4.2 G/DL (ref 3.5–5.2)
ALBUMIN/GLOB SERPL: 1.6 {RATIO} (ref 1–2.5)
ALP SERPL-CCNC: 96 U/L (ref 40–129)
ALT SERPL-CCNC: 56 U/L (ref 10–50)
ANION GAP SERPL CALCULATED.3IONS-SCNC: 14 MMOL/L (ref 9–16)
AST SERPL-CCNC: 72 U/L (ref 10–50)
BACTERIA URNS QL MICRO: NORMAL
BASOPHILS # BLD: 0.04 K/UL (ref 0–0.2)
BASOPHILS NFR BLD: 1 % (ref 0–2)
BILIRUB SERPL-MCNC: 0.7 MG/DL (ref 0–1.2)
BILIRUB UR QL STRIP: NEGATIVE
BUN SERPL-MCNC: 65 MG/DL (ref 8–23)
CALCIUM SERPL-MCNC: 10.1 MG/DL (ref 8.6–10.4)
CASTS #/AREA URNS LPF: NORMAL /LPF (ref 0–8)
CHLORIDE SERPL-SCNC: 103 MMOL/L (ref 98–107)
CLARITY UR: CLEAR
CO2 SERPL-SCNC: 24 MMOL/L (ref 20–31)
COLOR UR: YELLOW
CREAT SERPL-MCNC: 3.4 MG/DL (ref 0.7–1.2)
EOSINOPHIL # BLD: 0.08 K/UL (ref 0–0.44)
EOSINOPHILS RELATIVE PERCENT: 2 % (ref 1–4)
EPI CELLS #/AREA URNS HPF: NORMAL /HPF (ref 0–5)
ERYTHROCYTE [DISTWIDTH] IN BLOOD BY AUTOMATED COUNT: 16.3 % (ref 11.8–14.4)
GFR, ESTIMATED: 16 ML/MIN/1.73M2
GLUCOSE SERPL-MCNC: 100 MG/DL (ref 74–99)
GLUCOSE UR STRIP-MCNC: NEGATIVE MG/DL
HCT VFR BLD AUTO: 44.7 % (ref 40.7–50.3)
HGB BLD-MCNC: 13.7 G/DL (ref 13–17)
HGB UR QL STRIP.AUTO: ABNORMAL
IMM GRANULOCYTES # BLD AUTO: <0.03 K/UL (ref 0–0.3)
IMM GRANULOCYTES NFR BLD: 0 %
KETONES UR STRIP-MCNC: NEGATIVE MG/DL
LEUKOCYTE ESTERASE UR QL STRIP: NEGATIVE
LYMPHOCYTES NFR BLD: 1.81 K/UL (ref 1.1–3.7)
LYMPHOCYTES RELATIVE PERCENT: 40 % (ref 24–43)
MCH RBC QN AUTO: 25.2 PG (ref 25.2–33.5)
MCHC RBC AUTO-ENTMCNC: 30.6 G/DL (ref 28.4–34.8)
MCV RBC AUTO: 82.3 FL (ref 82.6–102.9)
MONOCYTES NFR BLD: 0.62 K/UL (ref 0.1–1.2)
MONOCYTES NFR BLD: 14 % (ref 3–12)
NEUTROPHILS NFR BLD: 43 % (ref 36–65)
NEUTS SEG NFR BLD: 1.94 K/UL (ref 1.5–8.1)
NITRITE UR QL STRIP: NEGATIVE
NRBC BLD-RTO: 0 PER 100 WBC
PH UR STRIP: 5.5 [PH] (ref 5–8)
PHOSPHATE SERPL-MCNC: 3.5 MG/DL (ref 2.5–4.5)
PLATELET # BLD AUTO: 133 K/UL (ref 138–453)
PMV BLD AUTO: 12 FL (ref 8.1–13.5)
POTASSIUM SERPL-SCNC: 4.4 MMOL/L (ref 3.7–5.3)
PROT SERPL-MCNC: 6.9 G/DL (ref 6.6–8.7)
PROT UR STRIP-MCNC: ABNORMAL MG/DL
RBC # BLD AUTO: 5.43 M/UL (ref 4.21–5.77)
RBC # BLD: ABNORMAL 10*6/UL
RBC #/AREA URNS HPF: NORMAL /HPF (ref 0–4)
SODIUM SERPL-SCNC: 141 MMOL/L (ref 136–145)
SP GR UR STRIP: 1.01 (ref 1–1.03)
UROBILINOGEN UR STRIP-ACNC: NORMAL EU/DL (ref 0–1)
WBC #/AREA URNS HPF: NORMAL /HPF (ref 0–5)
WBC OTHER # BLD: 4.5 K/UL (ref 3.5–11.3)

## 2025-05-06 ENCOUNTER — OFFICE VISIT (OUTPATIENT)
Dept: FAMILY MEDICINE CLINIC | Age: 89
End: 2025-05-06
Payer: MEDICARE

## 2025-05-06 VITALS
WEIGHT: 161.6 LBS | SYSTOLIC BLOOD PRESSURE: 106 MMHG | HEIGHT: 69 IN | BODY MASS INDEX: 23.93 KG/M2 | RESPIRATION RATE: 14 BRPM | TEMPERATURE: 97.2 F | DIASTOLIC BLOOD PRESSURE: 52 MMHG

## 2025-05-06 DIAGNOSIS — E78.00 HYPERCHOLESTEREMIA: ICD-10-CM

## 2025-05-06 DIAGNOSIS — N18.4 CHRONIC KIDNEY DISEASE, STAGE IV (SEVERE) (HCC): ICD-10-CM

## 2025-05-06 DIAGNOSIS — Z76.89 ENCOUNTER TO ESTABLISH CARE WITH NEW PROVIDER: Primary | ICD-10-CM

## 2025-05-06 DIAGNOSIS — I10 ESSENTIAL HYPERTENSION: ICD-10-CM

## 2025-05-06 DIAGNOSIS — C61 PROSTATE CANCER (HCC): ICD-10-CM

## 2025-05-06 DIAGNOSIS — G47.30 SLEEP APNEA, UNSPECIFIED TYPE: ICD-10-CM

## 2025-05-06 DIAGNOSIS — M17.0 PRIMARY OSTEOARTHRITIS OF BOTH KNEES: ICD-10-CM

## 2025-05-06 DIAGNOSIS — I50.9 CONGESTIVE HEART FAILURE, UNSPECIFIED HF CHRONICITY, UNSPECIFIED HEART FAILURE TYPE (HCC): ICD-10-CM

## 2025-05-06 PROBLEM — N18.30 CHRONIC RENAL DISEASE, STAGE III (HCC): Status: RESOLVED | Noted: 2022-08-19 | Resolved: 2025-05-06

## 2025-05-06 PROBLEM — I73.9 PERIPHERAL VASCULAR DISEASE: Status: RESOLVED | Noted: 2020-10-08 | Resolved: 2025-05-06

## 2025-05-06 PROBLEM — D69.6 THROMBOCYTOPENIA, UNSPECIFIED: Status: RESOLVED | Noted: 2024-10-30 | Resolved: 2025-05-06

## 2025-05-06 PROCEDURE — 99214 OFFICE O/P EST MOD 30 MIN: CPT | Performed by: FAMILY MEDICINE

## 2025-05-06 PROCEDURE — G8420 CALC BMI NORM PARAMETERS: HCPCS | Performed by: FAMILY MEDICINE

## 2025-05-06 PROCEDURE — 1036F TOBACCO NON-USER: CPT | Performed by: FAMILY MEDICINE

## 2025-05-06 PROCEDURE — 1123F ACP DISCUSS/DSCN MKR DOCD: CPT | Performed by: FAMILY MEDICINE

## 2025-05-06 PROCEDURE — 1159F MED LIST DOCD IN RCRD: CPT | Performed by: FAMILY MEDICINE

## 2025-05-06 PROCEDURE — G8427 DOCREV CUR MEDS BY ELIG CLIN: HCPCS | Performed by: FAMILY MEDICINE

## 2025-05-06 ASSESSMENT — ENCOUNTER SYMPTOMS
GASTROINTESTINAL NEGATIVE: 1
ALLERGIC/IMMUNOLOGIC NEGATIVE: 1
RESPIRATORY NEGATIVE: 1
EYES NEGATIVE: 1

## 2025-05-06 NOTE — PROGRESS NOTES
Subjective:      Patient ID: Preston Oliver is a 89 y.o. male.    Congestive Heart Failure  Presents for initial visit. The disease course has been stable. Associated symptoms include fatigue and muscle weakness. The symptoms have been stable. Past treatments include beta blockers (Bumex normal rectal sphincter tone. No external or internal lesions, Normal stool color, Guaiac negative.). The treatment provided moderate relief. His past medical history is significant for arrhythmia and HTN.       Review of Systems   Constitutional:  Positive for fatigue.   HENT: Negative.     Eyes: Negative.    Respiratory: Negative.     Cardiovascular: Negative.    Gastrointestinal: Negative.    Endocrine: Negative.    Musculoskeletal:  Positive for arthralgias, back pain, gait problem and muscle weakness.   Skin: Negative.    Allergic/Immunologic: Negative.    Hematological: Negative.    Psychiatric/Behavioral: Negative.     Past family and social history unremarkable.   Diagnosis Orders   1. Encounter to establish care with new provider        2. Sleep apnea, unspecified type        3. Prostate cancer (HCC)        4. Primary osteoarthritis of both knees        5. Hypercholesteremia        6. Essential hypertension        7. Congestive heart failure, unspecified HF chronicity, unspecified heart failure type (HCC)        8. Chronic kidney disease, stage IV (severe) ([facility])              Objective:   Physical Exam  Vitals and nursing note reviewed.   Constitutional:       Appearance: He is well-developed.      Comments: Suspected sleep apnea   HENT:      Head: Normocephalic and atraumatic.      Right Ear: External ear normal.      Left Ear: External ear normal.      Nose: Nose normal.   Eyes:      Conjunctiva/sclera: Conjunctivae normal.      Pupils: Pupils are equal, round, and reactive to light.   Cardiovascular:      Rate and Rhythm: Normal rate and regular rhythm.      Heart sounds: Normal heart sounds.      Comments: Systolic

## 2025-06-04 ENCOUNTER — OFFICE VISIT (OUTPATIENT)
Dept: FAMILY MEDICINE CLINIC | Age: 89
End: 2025-06-04
Payer: MEDICARE

## 2025-06-04 ENCOUNTER — HOSPITAL ENCOUNTER (OUTPATIENT)
Age: 89
Setting detail: SPECIMEN
Discharge: HOME | End: 2025-06-04

## 2025-06-04 VITALS
DIASTOLIC BLOOD PRESSURE: 60 MMHG | HEIGHT: 69 IN | BODY MASS INDEX: 23.7 KG/M2 | SYSTOLIC BLOOD PRESSURE: 116 MMHG | TEMPERATURE: 97.4 F | RESPIRATION RATE: 12 BRPM | WEIGHT: 160 LBS

## 2025-06-04 DIAGNOSIS — E78.00 HYPERCHOLESTEREMIA: ICD-10-CM

## 2025-06-04 DIAGNOSIS — I10 ESSENTIAL HYPERTENSION: ICD-10-CM

## 2025-06-04 DIAGNOSIS — Z09 HOSPITAL DISCHARGE FOLLOW-UP: Primary | ICD-10-CM

## 2025-06-04 DIAGNOSIS — G47.30 SLEEP APNEA, UNSPECIFIED TYPE: ICD-10-CM

## 2025-06-04 DIAGNOSIS — Z12.5 SCREENING PSA (PROSTATE SPECIFIC ANTIGEN): ICD-10-CM

## 2025-06-04 DIAGNOSIS — C61 PROSTATE CANCER (HCC): ICD-10-CM

## 2025-06-04 DIAGNOSIS — M17.0 PRIMARY OSTEOARTHRITIS OF BOTH KNEES: ICD-10-CM

## 2025-06-04 DIAGNOSIS — I50.9 CONGESTIVE HEART FAILURE, UNSPECIFIED HF CHRONICITY, UNSPECIFIED HEART FAILURE TYPE (HCC): ICD-10-CM

## 2025-06-04 DIAGNOSIS — N18.4 CHRONIC KIDNEY DISEASE, STAGE IV (SEVERE) (HCC): ICD-10-CM

## 2025-06-04 LAB
ANION GAP SERPL CALCULATED.3IONS-SCNC: 12 MMOL/L (ref 9–16)
BUN SERPL-MCNC: 56 MG/DL (ref 8–23)
CALCIUM SERPL-MCNC: 9.6 MG/DL (ref 8.6–10.4)
CHLORIDE SERPL-SCNC: 105 MMOL/L (ref 98–107)
CO2 SERPL-SCNC: 24 MMOL/L (ref 20–31)
CREAT SERPL-MCNC: 3.5 MG/DL (ref 0.7–1.2)
GFR, ESTIMATED: 16 ML/MIN/1.73M2
GLUCOSE SERPL-MCNC: 80 MG/DL (ref 74–99)
POTASSIUM SERPL-SCNC: 3.6 MMOL/L (ref 3.7–5.3)
SODIUM SERPL-SCNC: 141 MMOL/L (ref 136–145)

## 2025-06-04 PROCEDURE — 1160F RVW MEDS BY RX/DR IN RCRD: CPT | Performed by: FAMILY MEDICINE

## 2025-06-04 PROCEDURE — 1036F TOBACCO NON-USER: CPT | Performed by: FAMILY MEDICINE

## 2025-06-04 PROCEDURE — 1111F DSCHRG MED/CURRENT MED MERGE: CPT | Performed by: FAMILY MEDICINE

## 2025-06-04 PROCEDURE — 99214 OFFICE O/P EST MOD 30 MIN: CPT | Performed by: FAMILY MEDICINE

## 2025-06-04 PROCEDURE — G8427 DOCREV CUR MEDS BY ELIG CLIN: HCPCS | Performed by: FAMILY MEDICINE

## 2025-06-04 PROCEDURE — 1159F MED LIST DOCD IN RCRD: CPT | Performed by: FAMILY MEDICINE

## 2025-06-04 PROCEDURE — G8420 CALC BMI NORM PARAMETERS: HCPCS | Performed by: FAMILY MEDICINE

## 2025-06-04 PROCEDURE — 1123F ACP DISCUSS/DSCN MKR DOCD: CPT | Performed by: FAMILY MEDICINE

## 2025-06-04 ASSESSMENT — ENCOUNTER SYMPTOMS
ALLERGIC/IMMUNOLOGIC NEGATIVE: 1
EYES NEGATIVE: 1
BACK PAIN: 1
SHORTNESS OF BREATH: 1
GASTROINTESTINAL NEGATIVE: 1

## 2025-06-04 NOTE — PROGRESS NOTES
Subjective:      Patient ID: Preston Oliver is a 89 y.o. male.    Congestive Heart Failure  Presents for follow-up visit. Associated symptoms include orthopnea and shortness of breath. The symptoms have been improving. Compliance with total regimen is %. Compliance with diet is 51-75%. Compliance with exercise is 51-75%. Compliance with medications is %.       Review of Systems   Constitutional: Negative.    HENT: Negative.     Eyes: Negative.    Respiratory:  Positive for shortness of breath.    Cardiovascular: Negative.    Gastrointestinal: Negative.    Endocrine: Negative.    Musculoskeletal:  Positive for arthralgias, back pain and gait problem.   Skin: Negative.    Allergic/Immunologic: Negative.    Hematological: Negative.    Psychiatric/Behavioral: Negative.       Past family and social history unremarkable.   Diagnosis Orders   1. Hospital discharge follow-up  OR DISCHARGE MEDS RECONCILED W/ CURRENT OUTPATIENT MED LIST      2. Screening PSA (prostate specific antigen)        3. Sleep apnea, unspecified type        4. Prostate cancer (HCC)        5. Primary osteoarthritis of both knees        6. Hypercholesteremia        7. Chronic kidney disease, stage IV (severe) ([facility])        8. Congestive heart failure, unspecified HF chronicity, unspecified heart failure type (HCC)        9. Essential hypertension            Objective:   Physical Exam  Vitals and nursing note reviewed.   Constitutional:       Appearance: He is well-developed.      Comments: Chronic malnutrition   HENT:      Head: Normocephalic and atraumatic.      Right Ear: External ear normal.      Left Ear: External ear normal.      Nose: Nose normal.   Eyes:      Conjunctiva/sclera: Conjunctivae normal.      Pupils: Pupils are equal, round, and reactive to light.   Cardiovascular:      Rate and Rhythm: Normal rate and regular rhythm.      Heart sounds: Normal heart sounds.      Comments: Chronic systolic congestive heart

## 2025-07-07 ENCOUNTER — HOSPITAL ENCOUNTER (OUTPATIENT)
Age: 89
Setting detail: SPECIMEN
Discharge: HOME | End: 2025-07-07

## 2025-07-07 LAB
25(OH)D3 SERPL-MCNC: 52.6 NG/ML (ref 30–100)
ALBUMIN SERPL-MCNC: 3.9 G/DL (ref 3.5–5.2)
ALBUMIN/GLOB SERPL: 1.6 {RATIO} (ref 1–2.5)
ALP SERPL-CCNC: 116 U/L (ref 40–129)
ALT SERPL-CCNC: 63 U/L (ref 10–50)
ANION GAP SERPL CALCULATED.3IONS-SCNC: 13 MMOL/L (ref 9–16)
AST SERPL-CCNC: 56 U/L (ref 10–50)
BACTERIA URNS QL MICRO: NORMAL
BILIRUB SERPL-MCNC: 0.8 MG/DL (ref 0–1.2)
BILIRUB UR QL STRIP: NEGATIVE
BUN SERPL-MCNC: 52 MG/DL (ref 8–23)
CALCIUM SERPL-MCNC: 10 MG/DL (ref 8.6–10.4)
CASTS #/AREA URNS LPF: NORMAL /LPF (ref 0–8)
CHLORIDE SERPL-SCNC: 105 MMOL/L (ref 98–107)
CLARITY UR: CLEAR
CO2 SERPL-SCNC: 22 MMOL/L (ref 20–31)
COLOR UR: YELLOW
CREAT SERPL-MCNC: 3.2 MG/DL (ref 0.7–1.2)
EPI CELLS #/AREA URNS HPF: NORMAL /HPF (ref 0–5)
GFR, ESTIMATED: 17 ML/MIN/1.73M2
GLUCOSE SERPL-MCNC: 94 MG/DL (ref 74–99)
GLUCOSE UR STRIP-MCNC: NEGATIVE MG/DL
HCT VFR BLD AUTO: 44.2 % (ref 40.7–50.3)
HGB UR QL STRIP.AUTO: ABNORMAL
KETONES UR STRIP-MCNC: NEGATIVE MG/DL
LEUKOCYTE ESTERASE UR QL STRIP: NEGATIVE
NITRITE UR QL STRIP: NEGATIVE
PH UR STRIP: 5.5 [PH] (ref 5–8)
PHOSPHATE SERPL-MCNC: 3.2 MG/DL (ref 2.5–4.5)
POTASSIUM SERPL-SCNC: 4.2 MMOL/L (ref 3.7–5.3)
PROT SERPL-MCNC: 6.4 G/DL (ref 6.6–8.7)
PROT UR STRIP-MCNC: ABNORMAL MG/DL
PTH-INTACT SERPL-MCNC: 209 PG/ML (ref 17.9–58.6)
RBC #/AREA URNS HPF: NORMAL /HPF (ref 0–4)
SODIUM SERPL-SCNC: 140 MMOL/L (ref 136–145)
SP GR UR STRIP: 1.01 (ref 1–1.03)
UROBILINOGEN UR STRIP-ACNC: NORMAL EU/DL (ref 0–1)
WBC #/AREA URNS HPF: NORMAL /HPF (ref 0–5)

## 2025-08-04 ENCOUNTER — HOSPITAL ENCOUNTER (OUTPATIENT)
Age: 89
Setting detail: SPECIMEN
Discharge: HOME | End: 2025-08-04

## 2025-08-04 LAB
ALBUMIN SERPL-MCNC: 4 G/DL (ref 3.5–5.2)
ALBUMIN/GLOB SERPL: 1.4 {RATIO} (ref 1–2.5)
ALP SERPL-CCNC: 176 U/L (ref 40–129)
ALT SERPL-CCNC: 127 U/L (ref 10–50)
ANION GAP SERPL CALCULATED.3IONS-SCNC: 13 MMOL/L (ref 9–16)
AST SERPL-CCNC: 103 U/L (ref 10–50)
BILIRUB SERPL-MCNC: 0.7 MG/DL (ref 0–1.2)
BNP SERPL-MCNC: ABNORMAL PG/ML (ref 0–450)
BUN SERPL-MCNC: 67 MG/DL (ref 8–23)
CALCIUM SERPL-MCNC: 10.5 MG/DL (ref 8.6–10.4)
CHLORIDE SERPL-SCNC: 104 MMOL/L (ref 98–107)
CHOLEST SERPL-MCNC: 124 MG/DL (ref 0–199)
CHOLESTEROL/HDL RATIO: 2.7
CK SERPL-CCNC: 324 U/L (ref 39–308)
CO2 SERPL-SCNC: 25 MMOL/L (ref 20–31)
CREAT SERPL-MCNC: 3.6 MG/DL (ref 0.7–1.2)
ERYTHROCYTE [DISTWIDTH] IN BLOOD BY AUTOMATED COUNT: 17.1 % (ref 11.8–14.4)
GFR, ESTIMATED: 15 ML/MIN/1.73M2
GLUCOSE SERPL-MCNC: 70 MG/DL (ref 74–99)
HCT VFR BLD AUTO: 46.1 % (ref 40.7–50.3)
HDLC SERPL-MCNC: 46 MG/DL
HGB BLD-MCNC: 14.8 G/DL (ref 13–17)
LDLC SERPL CALC-MCNC: 58 MG/DL (ref 0–100)
MCH RBC QN AUTO: 26.1 PG (ref 25.2–33.5)
MCHC RBC AUTO-ENTMCNC: 32.1 G/DL (ref 28.4–34.8)
MCV RBC AUTO: 81.3 FL (ref 82.6–102.9)
NRBC BLD-RTO: 0 PER 100 WBC
PLATELET # BLD AUTO: ABNORMAL K/UL (ref 138–453)
PLATELET, FLUORESCENCE: 127 K/UL (ref 138–453)
PLATELETS.RETICULATED NFR BLD AUTO: 4.6 % (ref 1.1–10.3)
POTASSIUM SERPL-SCNC: 4.3 MMOL/L (ref 3.7–5.3)
PROT SERPL-MCNC: 6.9 G/DL (ref 6.6–8.7)
RBC # BLD AUTO: 5.67 M/UL (ref 4.21–5.77)
SODIUM SERPL-SCNC: 142 MMOL/L (ref 136–145)
T4 FREE SERPL-MCNC: 1.3 NG/DL (ref 0.92–1.68)
TRIGL SERPL-MCNC: 98 MG/DL
TSH SERPL DL<=0.05 MIU/L-ACNC: 3.86 UIU/ML (ref 0.27–4.2)
VLDLC SERPL CALC-MCNC: 20 MG/DL (ref 1–30)
WBC OTHER # BLD: 4.6 K/UL (ref 3.5–11.3)

## 2025-08-10 ENCOUNTER — APPOINTMENT (OUTPATIENT)
Dept: GENERAL RADIOLOGY | Age: 89
End: 2025-08-10
Payer: COMMERCIAL

## 2025-08-10 ENCOUNTER — HOSPITAL ENCOUNTER (EMERGENCY)
Age: 89
Discharge: HOME | End: 2025-08-10
Attending: EMERGENCY MEDICINE
Payer: COMMERCIAL

## 2025-08-10 VITALS
DIASTOLIC BLOOD PRESSURE: 58 MMHG | WEIGHT: 152 LBS | SYSTOLIC BLOOD PRESSURE: 96 MMHG | HEART RATE: 76 BPM | RESPIRATION RATE: 18 BRPM | HEIGHT: 69 IN | TEMPERATURE: 97.9 F | BODY MASS INDEX: 22.51 KG/M2 | OXYGEN SATURATION: 96 %
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
## 2025-08-10 DIAGNOSIS — S61.012A THUMB LACERATION, LEFT, INITIAL ENCOUNTER: ICD-10-CM

## 2025-08-10 PROCEDURE — 72170 X-RAY EXAM OF PELVIS: CPT

## 2025-08-10 PROCEDURE — 73552 X-RAY EXAM OF FEMUR 2/>: CPT

## 2025-08-10 PROCEDURE — 73130 X-RAY EXAM OF HAND: CPT

## 2025-08-10 PROCEDURE — 71045 X-RAY EXAM CHEST 1 VIEW: CPT

## 2025-08-10 PROCEDURE — 99284 EMERGENCY DEPT VISIT MOD MDM: CPT

## 2025-08-10 PROCEDURE — 6370000000 HC RX 637 (ALT 250 FOR IP): Performed by: EMERGENCY MEDICINE

## 2025-08-10 PROCEDURE — 12002 RPR S/N/AX/GEN/TRNK2.6-7.5CM: CPT

## 2025-08-10 PROCEDURE — 90715 TDAP VACCINE 7 YRS/> IM: CPT | Performed by: EMERGENCY MEDICINE

## 2025-08-10 PROCEDURE — 90471 IMMUNIZATION ADMIN: CPT | Performed by: EMERGENCY MEDICINE

## 2025-08-10 PROCEDURE — 6360000002 HC RX W HCPCS: Performed by: EMERGENCY MEDICINE

## 2025-08-10 RX ORDER — LIDOCAINE HYDROCHLORIDE 10 MG/ML
5 INJECTION, SOLUTION INFILTRATION; PERINEURAL ONCE
Status: COMPLETED | OUTPATIENT
Start: 2025-08-10 | End: 2025-08-10

## 2025-08-10 RX ADMIN — Medication 3 ML: at 17:10

## 2025-08-10 RX ADMIN — TETANUS TOXOID, REDUCED DIPHTHERIA TOXOID AND ACELLULAR PERTUSSIS VACCINE, ADSORBED 0.5 ML: 5; 2.5; 8; 8; 2.5 SUSPENSION INTRAMUSCULAR at 18:25

## 2025-08-10 RX ADMIN — LIDOCAINE HYDROCHLORIDE 5 ML: 10 INJECTION, SOLUTION INFILTRATION; PERINEURAL at 18:27

## 2025-08-10 ASSESSMENT — PAIN - FUNCTIONAL ASSESSMENT: PAIN_FUNCTIONAL_ASSESSMENT: 0-10

## 2025-08-10 ASSESSMENT — PAIN SCALES - GENERAL: PAINLEVEL_OUTOF10: 3

## 2025-08-11 ENCOUNTER — OFFICE VISIT (OUTPATIENT)
Dept: FAMILY MEDICINE CLINIC | Age: 89
End: 2025-08-11
Payer: MEDICARE

## 2025-08-11 VITALS
RESPIRATION RATE: 12 BRPM | DIASTOLIC BLOOD PRESSURE: 60 MMHG | SYSTOLIC BLOOD PRESSURE: 100 MMHG | BODY MASS INDEX: 22.07 KG/M2 | HEIGHT: 69 IN | TEMPERATURE: 97.3 F | WEIGHT: 149 LBS

## 2025-08-11 DIAGNOSIS — G47.30 SLEEP APNEA, UNSPECIFIED TYPE: ICD-10-CM

## 2025-08-11 DIAGNOSIS — E78.00 HYPERCHOLESTEREMIA: ICD-10-CM

## 2025-08-11 DIAGNOSIS — I50.9 CONGESTIVE HEART FAILURE, UNSPECIFIED HF CHRONICITY, UNSPECIFIED HEART FAILURE TYPE (HCC): ICD-10-CM

## 2025-08-11 DIAGNOSIS — N18.4 CHRONIC KIDNEY DISEASE, STAGE IV (SEVERE) (HCC): ICD-10-CM

## 2025-08-11 DIAGNOSIS — Z12.5 SCREENING PSA (PROSTATE SPECIFIC ANTIGEN): ICD-10-CM

## 2025-08-11 DIAGNOSIS — C61 PROSTATE CANCER (HCC): ICD-10-CM

## 2025-08-11 DIAGNOSIS — M17.0 PRIMARY OSTEOARTHRITIS OF BOTH KNEES: ICD-10-CM

## 2025-08-11 DIAGNOSIS — I10 ESSENTIAL HYPERTENSION: ICD-10-CM

## 2025-08-11 DIAGNOSIS — Z00.00 MEDICARE ANNUAL WELLNESS VISIT, SUBSEQUENT: Primary | ICD-10-CM

## 2025-08-11 PROCEDURE — 1123F ACP DISCUSS/DSCN MKR DOCD: CPT | Performed by: FAMILY MEDICINE

## 2025-08-11 PROCEDURE — G0439 PPPS, SUBSEQ VISIT: HCPCS | Performed by: FAMILY MEDICINE

## 2025-08-11 RX ORDER — CALCITONIN SALMON 200 [IU]/.09ML
1 SPRAY, METERED NASAL
COMMUNITY

## 2025-08-11 RX ORDER — BUMETANIDE 1 MG/1
TABLET ORAL
COMMUNITY
Start: 2025-06-16

## 2025-08-11 RX ORDER — CALCITRIOL 0.25 UG/1
1 CAPSULE, LIQUID FILLED ORAL
COMMUNITY
Start: 2025-07-10

## 2025-08-11 RX ORDER — GABAPENTIN 100 MG/1
CAPSULE ORAL
COMMUNITY

## 2025-08-11 RX ORDER — TORSEMIDE 20 MG/1
20 TABLET ORAL
COMMUNITY

## 2025-08-11 SDOH — ECONOMIC STABILITY: FOOD INSECURITY: WITHIN THE PAST 12 MONTHS, THE FOOD YOU BOUGHT JUST DIDN'T LAST AND YOU DIDN'T HAVE MONEY TO GET MORE.: PATIENT DECLINED

## 2025-08-11 SDOH — ECONOMIC STABILITY: FOOD INSECURITY: WITHIN THE PAST 12 MONTHS, YOU WORRIED THAT YOUR FOOD WOULD RUN OUT BEFORE YOU GOT MONEY TO BUY MORE.: PATIENT DECLINED

## 2025-08-11 ASSESSMENT — LIFESTYLE VARIABLES
HOW MANY STANDARD DRINKS CONTAINING ALCOHOL DO YOU HAVE ON A TYPICAL DAY: 1 OR 2
HOW OFTEN DO YOU HAVE A DRINK CONTAINING ALCOHOL: MONTHLY OR LESS

## 2025-08-11 ASSESSMENT — PATIENT HEALTH QUESTIONNAIRE - PHQ9
SUM OF ALL RESPONSES TO PHQ QUESTIONS 1-9: 0
2. FEELING DOWN, DEPRESSED OR HOPELESS: NOT AT ALL
1. LITTLE INTEREST OR PLEASURE IN DOING THINGS: NOT AT ALL
SUM OF ALL RESPONSES TO PHQ QUESTIONS 1-9: 0

## 2025-08-13 ENCOUNTER — APPOINTMENT (OUTPATIENT)
Dept: GENERAL RADIOLOGY | Age: 89
End: 2025-08-13
Payer: MEDICARE

## 2025-08-13 ENCOUNTER — HOSPITAL ENCOUNTER (EMERGENCY)
Age: 89
Discharge: HOME | End: 2025-08-13
Attending: EMERGENCY MEDICINE
Payer: MEDICARE

## 2025-08-13 VITALS
RESPIRATION RATE: 20 BRPM | WEIGHT: 149 LBS | OXYGEN SATURATION: 95 % | DIASTOLIC BLOOD PRESSURE: 96 MMHG | SYSTOLIC BLOOD PRESSURE: 145 MMHG | TEMPERATURE: 97.8 F | HEART RATE: 66 BPM | BODY MASS INDEX: 22.07 KG/M2 | HEIGHT: 69 IN

## 2025-08-13 DIAGNOSIS — Z51.89 VISIT FOR WOUND CHECK: Primary | ICD-10-CM

## 2025-08-13 PROCEDURE — 99283 EMERGENCY DEPT VISIT LOW MDM: CPT

## 2025-08-13 RX ORDER — CLINDAMYCIN HYDROCHLORIDE 300 MG/1
300 CAPSULE ORAL 3 TIMES DAILY
Qty: 21 CAPSULE | Refills: 0 | Status: SHIPPED | OUTPATIENT
Start: 2025-08-13 | End: 2025-08-20

## 2025-08-18 ENCOUNTER — HOSPITAL ENCOUNTER (EMERGENCY)
Age: 89
Discharge: HOME | End: 2025-08-18
Payer: MEDICARE

## 2025-08-18 VITALS
TEMPERATURE: 97.9 F | OXYGEN SATURATION: 97 % | SYSTOLIC BLOOD PRESSURE: 104 MMHG | RESPIRATION RATE: 15 BRPM | HEART RATE: 62 BPM | DIASTOLIC BLOOD PRESSURE: 67 MMHG

## 2025-08-18 DIAGNOSIS — Z48.02 ENCOUNTER FOR REMOVAL OF SUTURES: Primary | ICD-10-CM

## 2025-08-18 PROCEDURE — 99282 EMERGENCY DEPT VISIT SF MDM: CPT

## 2025-08-18 ASSESSMENT — ENCOUNTER SYMPTOMS
SHORTNESS OF BREATH: 0
COLOR CHANGE: 0

## 2025-08-19 ENCOUNTER — HOSPITAL ENCOUNTER (EMERGENCY)
Age: 89
Discharge: HOME | End: 2025-08-19
Attending: STUDENT IN AN ORGANIZED HEALTH CARE EDUCATION/TRAINING PROGRAM
Payer: MEDICARE

## 2025-08-19 ENCOUNTER — APPOINTMENT (OUTPATIENT)
Dept: GENERAL RADIOLOGY | Age: 89
End: 2025-08-19
Payer: MEDICARE

## 2025-08-19 VITALS
HEART RATE: 89 BPM | DIASTOLIC BLOOD PRESSURE: 55 MMHG | SYSTOLIC BLOOD PRESSURE: 103 MMHG | TEMPERATURE: 97.4 F | RESPIRATION RATE: 16 BRPM | OXYGEN SATURATION: 100 %

## 2025-08-19 DIAGNOSIS — S80.02XA CONTUSION OF LEFT KNEE, INITIAL ENCOUNTER: Primary | ICD-10-CM

## 2025-08-19 DIAGNOSIS — M79.89 LEG SWELLING: ICD-10-CM

## 2025-08-19 DIAGNOSIS — R79.89 ELEVATED D-DIMER: ICD-10-CM

## 2025-08-19 LAB
ANION GAP SERPL CALCULATED.3IONS-SCNC: 13 MMOL/L (ref 9–16)
BASOPHILS # BLD: 0.04 K/UL (ref 0–0.2)
BASOPHILS NFR BLD: 1 % (ref 0–2)
BUN SERPL-MCNC: 63 MG/DL (ref 8–23)
CALCIUM SERPL-MCNC: 9.4 MG/DL (ref 8.2–9.6)
CHLORIDE SERPL-SCNC: 99 MMOL/L (ref 98–107)
CO2 SERPL-SCNC: 23 MMOL/L (ref 20–31)
CREAT SERPL-MCNC: 3.4 MG/DL (ref 0.7–1.2)
D DIMER PPP FEU-MCNC: 1.43 UG/ML FEU (ref 0–0.59)
EOSINOPHIL # BLD: 0.22 K/UL (ref 0–0.44)
EOSINOPHILS RELATIVE PERCENT: 4 % (ref 1–4)
ERYTHROCYTE [DISTWIDTH] IN BLOOD BY AUTOMATED COUNT: 16 % (ref 11.8–14.4)
GFR, ESTIMATED: 16 ML/MIN/1.73M2
GLUCOSE SERPL-MCNC: 91 MG/DL (ref 75–121)
HCT VFR BLD AUTO: 38.1 % (ref 40.7–50.3)
HGB BLD-MCNC: 12.6 G/DL (ref 13–17)
IMM GRANULOCYTES # BLD AUTO: 0.01 K/UL (ref 0–0.3)
IMM GRANULOCYTES NFR BLD: 0 %
LYMPHOCYTES NFR BLD: 1.61 K/UL (ref 1.1–3.7)
LYMPHOCYTES RELATIVE PERCENT: 31 % (ref 24–43)
MCH RBC QN AUTO: 26.6 PG (ref 25.2–33.5)
MCHC RBC AUTO-ENTMCNC: 33.1 G/DL (ref 28.4–34.8)
MCV RBC AUTO: 80.5 FL (ref 82.6–102.9)
MONOCYTES NFR BLD: 0.79 K/UL (ref 0.1–1.2)
MONOCYTES NFR BLD: 15 % (ref 3–12)
NEUTROPHILS NFR BLD: 49 % (ref 36–65)
NEUTS SEG NFR BLD: 2.49 K/UL (ref 1.5–8.1)
NRBC BLD-RTO: 0 PER 100 WBC
PLATELET # BLD AUTO: 149 K/UL (ref 138–453)
PMV BLD AUTO: 11 FL (ref 8.1–13.5)
POTASSIUM SERPL-SCNC: 3.4 MMOL/L (ref 3.7–5.3)
RBC # BLD AUTO: 4.73 M/UL (ref 4.21–5.77)
RBC # BLD: ABNORMAL 10*6/UL
SODIUM SERPL-SCNC: 135 MMOL/L (ref 136–145)
WBC OTHER # BLD: 5.2 K/UL (ref 3.5–11.3)

## 2025-08-19 PROCEDURE — 73562 X-RAY EXAM OF KNEE 3: CPT

## 2025-08-19 PROCEDURE — 85025 COMPLETE CBC W/AUTO DIFF WBC: CPT

## 2025-08-19 PROCEDURE — 99284 EMERGENCY DEPT VISIT MOD MDM: CPT

## 2025-08-19 PROCEDURE — 85379 FIBRIN DEGRADATION QUANT: CPT

## 2025-08-19 PROCEDURE — 80048 BASIC METABOLIC PNL TOTAL CA: CPT

## 2025-08-19 RX ORDER — ACETAMINOPHEN AND CODEINE PHOSPHATE 300; 30 MG/1; MG/1
1-2 TABLET ORAL EVERY 8 HOURS PRN
Qty: 12 TABLET | Refills: 0 | Status: SHIPPED | OUTPATIENT
Start: 2025-08-19 | End: 2025-08-24

## 2025-08-19 ASSESSMENT — PAIN - FUNCTIONAL ASSESSMENT: PAIN_FUNCTIONAL_ASSESSMENT: 0-10

## 2025-08-19 ASSESSMENT — PAIN SCALES - GENERAL: PAINLEVEL_OUTOF10: 7

## 2025-08-20 ENCOUNTER — HOSPITAL ENCOUNTER (OUTPATIENT)
Dept: VASCULAR LAB | Age: 89
Discharge: HOME | End: 2025-08-22
Payer: MEDICARE

## 2025-08-20 DIAGNOSIS — R79.89 ELEVATED D-DIMER: ICD-10-CM

## 2025-08-20 DIAGNOSIS — M79.89 LEG SWELLING: ICD-10-CM

## 2025-08-20 PROCEDURE — 93971 EXTREMITY STUDY: CPT

## 2025-08-20 PROCEDURE — 93971 EXTREMITY STUDY: CPT | Performed by: SURGERY

## 2025-08-21 ENCOUNTER — TELEPHONE (OUTPATIENT)
Dept: PHYSICAL MEDICINE AND REHAB | Age: 89
End: 2025-08-21

## 2025-08-21 ENCOUNTER — HOSPITAL ENCOUNTER (OUTPATIENT)
Dept: PHYSICAL THERAPY | Age: 89
Setting detail: THERAPIES SERIES
Discharge: HOME | End: 2025-08-21

## 2025-08-21 ENCOUNTER — INITIAL CONSULT (OUTPATIENT)
Dept: PHYSICAL MEDICINE AND REHAB | Age: 89
End: 2025-08-21
Payer: MEDICARE

## 2025-08-21 VITALS — DIASTOLIC BLOOD PRESSURE: 60 MMHG | HEART RATE: 75 BPM | TEMPERATURE: 97.4 F | SYSTOLIC BLOOD PRESSURE: 98 MMHG

## 2025-08-21 DIAGNOSIS — M25.552 BILATERAL HIP PAIN: ICD-10-CM

## 2025-08-21 DIAGNOSIS — M25.551 BILATERAL HIP PAIN: ICD-10-CM

## 2025-08-21 DIAGNOSIS — Z76.89 ENCOUNTER FOR POWER MOBILITY DEVICE ASSESSMENT: ICD-10-CM

## 2025-08-21 DIAGNOSIS — I50.9 CONGESTIVE HEART FAILURE, UNSPECIFIED HF CHRONICITY, UNSPECIFIED HEART FAILURE TYPE (HCC): Primary | ICD-10-CM

## 2025-08-21 DIAGNOSIS — M17.11 PRIMARY OSTEOARTHRITIS OF RIGHT KNEE: ICD-10-CM

## 2025-08-21 DIAGNOSIS — M17.9 OSTEOARTHRITIS OF KNEE, UNSPECIFIED LATERALITY, UNSPECIFIED OSTEOARTHRITIS TYPE: ICD-10-CM

## 2025-08-21 DIAGNOSIS — N18.4 CHRONIC KIDNEY DISEASE, STAGE IV (SEVERE) (HCC): ICD-10-CM

## 2025-08-21 PROCEDURE — 1123F ACP DISCUSS/DSCN MKR DOCD: CPT | Performed by: GENERAL PRACTICE

## 2025-08-21 PROCEDURE — 99204 OFFICE O/P NEW MOD 45 MIN: CPT | Performed by: GENERAL PRACTICE

## 2025-08-21 PROCEDURE — G8420 CALC BMI NORM PARAMETERS: HCPCS | Performed by: GENERAL PRACTICE

## 2025-08-21 PROCEDURE — G8427 DOCREV CUR MEDS BY ELIG CLIN: HCPCS | Performed by: GENERAL PRACTICE

## 2025-08-21 PROCEDURE — 1036F TOBACCO NON-USER: CPT | Performed by: GENERAL PRACTICE

## 2025-09-01 ENCOUNTER — APPOINTMENT (OUTPATIENT)
Dept: GENERAL RADIOLOGY | Age: 89
End: 2025-09-01
Payer: MEDICARE

## 2025-09-01 ENCOUNTER — HOSPITAL ENCOUNTER (EMERGENCY)
Age: 89
Discharge: HOME | End: 2025-09-01
Payer: MEDICARE

## 2025-09-01 VITALS
HEART RATE: 75 BPM | WEIGHT: 147 LBS | DIASTOLIC BLOOD PRESSURE: 61 MMHG | SYSTOLIC BLOOD PRESSURE: 95 MMHG | OXYGEN SATURATION: 100 % | TEMPERATURE: 97.9 F | RESPIRATION RATE: 16 BRPM | BODY MASS INDEX: 21.71 KG/M2

## 2025-09-01 DIAGNOSIS — N18.9 CHRONIC KIDNEY DISEASE, UNSPECIFIED CKD STAGE: ICD-10-CM

## 2025-09-01 DIAGNOSIS — M25.571 ACUTE RIGHT ANKLE PAIN: Primary | ICD-10-CM

## 2025-09-01 PROCEDURE — 73610 X-RAY EXAM OF ANKLE: CPT

## 2025-09-01 PROCEDURE — 99283 EMERGENCY DEPT VISIT LOW MDM: CPT

## 2025-09-01 ASSESSMENT — ENCOUNTER SYMPTOMS
SHORTNESS OF BREATH: 0
CHEST TIGHTNESS: 0

## 2025-09-04 ENCOUNTER — OFFICE VISIT (OUTPATIENT)
Dept: FAMILY MEDICINE CLINIC | Age: 89
End: 2025-09-04

## 2025-09-04 ENCOUNTER — HOSPITAL ENCOUNTER (OUTPATIENT)
Age: 89
Setting detail: SPECIMEN
Discharge: HOME | End: 2025-09-04

## 2025-09-04 VITALS
TEMPERATURE: 97.2 F | RESPIRATION RATE: 12 BRPM | SYSTOLIC BLOOD PRESSURE: 98 MMHG | DIASTOLIC BLOOD PRESSURE: 60 MMHG | HEIGHT: 69 IN | WEIGHT: 147 LBS | BODY MASS INDEX: 21.77 KG/M2

## 2025-09-04 DIAGNOSIS — G47.30 SLEEP APNEA, UNSPECIFIED TYPE: ICD-10-CM

## 2025-09-04 DIAGNOSIS — C61 PROSTATE CANCER (HCC): ICD-10-CM

## 2025-09-04 DIAGNOSIS — E78.00 HYPERCHOLESTEREMIA: ICD-10-CM

## 2025-09-04 DIAGNOSIS — M19.071 ARTHRITIS OF RIGHT ANKLE: Primary | ICD-10-CM

## 2025-09-04 DIAGNOSIS — M17.11 PRIMARY OSTEOARTHRITIS OF RIGHT KNEE: ICD-10-CM

## 2025-09-04 DIAGNOSIS — N18.4 CHRONIC KIDNEY DISEASE, STAGE IV (SEVERE) (HCC): ICD-10-CM

## 2025-09-04 DIAGNOSIS — I10 ESSENTIAL HYPERTENSION: ICD-10-CM

## 2025-09-04 DIAGNOSIS — I50.9 CONGESTIVE HEART FAILURE, UNSPECIFIED HF CHRONICITY, UNSPECIFIED HEART FAILURE TYPE (HCC): ICD-10-CM

## 2025-09-04 LAB
ANION GAP SERPL CALCULATED.3IONS-SCNC: 14 MMOL/L (ref 9–16)
BUN SERPL-MCNC: 63 MG/DL (ref 8–23)
CALCIUM SERPL-MCNC: 10.1 MG/DL (ref 8.6–10.4)
CHLORIDE SERPL-SCNC: 101 MMOL/L (ref 98–107)
CO2 SERPL-SCNC: 20 MMOL/L (ref 20–31)
CREAT SERPL-MCNC: 4.5 MG/DL (ref 0.7–1.2)
GFR, ESTIMATED: 11 ML/MIN/1.73M2
GLUCOSE SERPL-MCNC: 78 MG/DL (ref 74–99)
HCT VFR BLD AUTO: 40.3 % (ref 40.7–50.3)
POTASSIUM SERPL-SCNC: 4.1 MMOL/L (ref 3.7–5.3)
PSA SERPL-MCNC: <0.03 NG/ML (ref 0–4)
SODIUM SERPL-SCNC: 135 MMOL/L (ref 136–145)

## 2025-09-04 SDOH — ECONOMIC STABILITY: FOOD INSECURITY: WITHIN THE PAST 12 MONTHS, YOU WORRIED THAT YOUR FOOD WOULD RUN OUT BEFORE YOU GOT MONEY TO BUY MORE.: PATIENT DECLINED

## 2025-09-04 SDOH — ECONOMIC STABILITY: FOOD INSECURITY: WITHIN THE PAST 12 MONTHS, THE FOOD YOU BOUGHT JUST DIDN'T LAST AND YOU DIDN'T HAVE MONEY TO GET MORE.: PATIENT DECLINED

## 2025-09-04 ASSESSMENT — ENCOUNTER SYMPTOMS
BACK PAIN: 1
ALLERGIC/IMMUNOLOGIC NEGATIVE: 1
GASTROINTESTINAL NEGATIVE: 1
RESPIRATORY NEGATIVE: 1
EYES NEGATIVE: 1

## 2025-09-04 ASSESSMENT — PATIENT HEALTH QUESTIONNAIRE - PHQ9
1. LITTLE INTEREST OR PLEASURE IN DOING THINGS: NOT AT ALL
2. FEELING DOWN, DEPRESSED OR HOPELESS: NOT AT ALL
SUM OF ALL RESPONSES TO PHQ QUESTIONS 1-9: 0